# Patient Record
Sex: FEMALE | Race: BLACK OR AFRICAN AMERICAN | Employment: PART TIME | URBAN - METROPOLITAN AREA
[De-identification: names, ages, dates, MRNs, and addresses within clinical notes are randomized per-mention and may not be internally consistent; named-entity substitution may affect disease eponyms.]

---

## 2017-06-16 ENCOUNTER — OFFICE VISIT (OUTPATIENT)
Dept: INTERNAL MEDICINE CLINIC | Facility: CLINIC | Age: 57
End: 2017-06-16

## 2017-06-16 VITALS
RESPIRATION RATE: 12 BRPM | SYSTOLIC BLOOD PRESSURE: 160 MMHG | HEART RATE: 79 BPM | DIASTOLIC BLOOD PRESSURE: 100 MMHG | TEMPERATURE: 98 F | HEIGHT: 64 IN | BODY MASS INDEX: 41.83 KG/M2 | WEIGHT: 245 LBS

## 2017-06-16 DIAGNOSIS — E66.9 OBESITY, UNSPECIFIED OBESITY SEVERITY, UNSPECIFIED OBESITY TYPE: ICD-10-CM

## 2017-06-16 DIAGNOSIS — Z01.419 WELL FEMALE EXAM WITH ROUTINE GYNECOLOGICAL EXAM: ICD-10-CM

## 2017-06-16 DIAGNOSIS — R73.03 PREDIABETES: ICD-10-CM

## 2017-06-16 DIAGNOSIS — R60.0 BILATERAL LEG EDEMA: ICD-10-CM

## 2017-06-16 DIAGNOSIS — I10 ESSENTIAL HYPERTENSION: Primary | ICD-10-CM

## 2017-06-16 DIAGNOSIS — K92.1 HEMATOCHEZIA: ICD-10-CM

## 2017-06-16 PROCEDURE — 99213 OFFICE O/P EST LOW 20 MIN: CPT | Performed by: INTERNAL MEDICINE

## 2017-06-16 PROCEDURE — 99203 OFFICE O/P NEW LOW 30 MIN: CPT | Performed by: INTERNAL MEDICINE

## 2017-06-16 RX ORDER — VALSARTAN AND HYDROCHLOROTHIAZIDE 80; 12.5 MG/1; MG/1
1 TABLET, FILM COATED ORAL DAILY
Qty: 90 TABLET | Refills: 0 | Status: SHIPPED | OUTPATIENT
Start: 2017-06-16 | End: 2017-08-25

## 2017-06-16 NOTE — PROGRESS NOTES
HPI:    Patient ID: Lázaro Edmondson is a 65year old female. Blood In Stool  This is a new (red blood) problem. The current episode started 1 to 4 weeks ago (3 weeks). The problem occurs intermittently.  Associated symptoms include a change in bowel habit Hives   PHYSICAL EXAM:   Physical Exam   Constitutional: She appears well-developed. No distress.    obese   HENT:   Right Ear: External ear normal.   Left Ear: External ear normal.   Nose: Nose normal.   Mouth/Throat: Oropharynx is clear and mo has had chronic bilateral leg edema which apparently goes on and off for the past 3 years however has been progressive recently. She has no signs of CHF. She does have a obesity which is a contributing factor.   We will recheck her again once we restart h

## 2017-06-30 ENCOUNTER — LAB ENCOUNTER (OUTPATIENT)
Dept: LAB | Facility: HOSPITAL | Age: 57
End: 2017-06-30
Attending: INTERNAL MEDICINE
Payer: COMMERCIAL

## 2017-06-30 ENCOUNTER — OFFICE VISIT (OUTPATIENT)
Dept: GASTROENTEROLOGY | Facility: CLINIC | Age: 57
End: 2017-06-30

## 2017-06-30 VITALS
HEART RATE: 90 BPM | SYSTOLIC BLOOD PRESSURE: 146 MMHG | BODY MASS INDEX: 39.99 KG/M2 | HEIGHT: 65 IN | WEIGHT: 240 LBS | DIASTOLIC BLOOD PRESSURE: 80 MMHG

## 2017-06-30 DIAGNOSIS — Z13.220 LIPID SCREENING: ICD-10-CM

## 2017-06-30 DIAGNOSIS — E66.9 OBESITY, UNSPECIFIED OBESITY SEVERITY, UNSPECIFIED OBESITY TYPE: ICD-10-CM

## 2017-06-30 DIAGNOSIS — I10 ESSENTIAL HYPERTENSION: ICD-10-CM

## 2017-06-30 DIAGNOSIS — R73.03 PREDIABETES: ICD-10-CM

## 2017-06-30 DIAGNOSIS — K92.1 HEMATOCHEZIA: ICD-10-CM

## 2017-06-30 DIAGNOSIS — K92.1 BLOOD IN STOOL: Primary | ICD-10-CM

## 2017-06-30 DIAGNOSIS — K59.00 CONSTIPATION, UNSPECIFIED CONSTIPATION TYPE: ICD-10-CM

## 2017-06-30 DIAGNOSIS — R19.4 CHANGE IN BOWEL HABITS: ICD-10-CM

## 2017-06-30 LAB
ALBUMIN SERPL BCP-MCNC: 4.1 G/DL (ref 3.5–4.8)
ALBUMIN/GLOB SERPL: 1.2 {RATIO} (ref 1–2)
ALP SERPL-CCNC: 88 U/L (ref 32–100)
ALT SERPL-CCNC: 25 U/L (ref 14–54)
ANION GAP SERPL CALC-SCNC: 13 MMOL/L (ref 0–18)
AST SERPL-CCNC: 22 U/L (ref 15–41)
BASOPHILS # BLD: 0.1 K/UL (ref 0–0.2)
BASOPHILS NFR BLD: 1 %
BILIRUB SERPL-MCNC: 0.7 MG/DL (ref 0.3–1.2)
BILIRUB UR QL: NEGATIVE
BUN SERPL-MCNC: 11 MG/DL (ref 8–20)
BUN/CREAT SERPL: 13.8 (ref 10–20)
CALCIUM SERPL-MCNC: 9.7 MG/DL (ref 8.5–10.5)
CHLORIDE SERPL-SCNC: 102 MMOL/L (ref 95–110)
CO2 SERPL-SCNC: 22 MMOL/L (ref 22–32)
COLOR UR: YELLOW
CREAT SERPL-MCNC: 0.8 MG/DL (ref 0.5–1.5)
EOSINOPHIL # BLD: 0.2 K/UL (ref 0–0.7)
EOSINOPHIL NFR BLD: 2 %
ERYTHROCYTE [DISTWIDTH] IN BLOOD BY AUTOMATED COUNT: 13.9 % (ref 11–15)
GLOBULIN PLAS-MCNC: 3.3 G/DL (ref 2.5–3.7)
GLUCOSE SERPL-MCNC: 170 MG/DL (ref 70–99)
GLUCOSE UR-MCNC: NEGATIVE MG/DL
HBA1C MFR BLD: 7.8 % (ref 4–6)
HCT VFR BLD AUTO: 43.5 % (ref 35–48)
HGB BLD-MCNC: 14.2 G/DL (ref 12–16)
HGB UR QL STRIP.AUTO: NEGATIVE
KETONES UR-MCNC: NEGATIVE MG/DL
LEUKOCYTE ESTERASE UR QL STRIP.AUTO: NEGATIVE
LYMPHOCYTES # BLD: 3.6 K/UL (ref 1–4)
LYMPHOCYTES NFR BLD: 42 %
MCH RBC QN AUTO: 28.7 PG (ref 27–32)
MCHC RBC AUTO-ENTMCNC: 32.7 G/DL (ref 32–37)
MCV RBC AUTO: 87.7 FL (ref 80–100)
MONOCYTES # BLD: 0.6 K/UL (ref 0–1)
MONOCYTES NFR BLD: 8 %
NEUTROPHILS # BLD AUTO: 4 K/UL (ref 1.8–7.7)
NEUTROPHILS NFR BLD: 48 %
NITRITE UR QL STRIP.AUTO: NEGATIVE
OSMOLALITY UR CALC.SUM OF ELEC: 287 MOSM/KG (ref 275–295)
PH UR: 5 [PH] (ref 5–8)
PLATELET # BLD AUTO: 235 K/UL (ref 140–400)
PMV BLD AUTO: 9.8 FL (ref 7.4–10.3)
POTASSIUM SERPL-SCNC: 3.8 MMOL/L (ref 3.3–5.1)
PROT SERPL-MCNC: 7.4 G/DL (ref 5.9–8.4)
PROT UR-MCNC: NEGATIVE MG/DL
RBC # BLD AUTO: 4.96 M/UL (ref 3.7–5.4)
SODIUM SERPL-SCNC: 137 MMOL/L (ref 136–144)
SP GR UR STRIP: 1.02 (ref 1–1.03)
TSH SERPL-ACNC: 1.91 UIU/ML (ref 0.45–5.33)
UROBILINOGEN UR STRIP-ACNC: <2
VIT C UR-MCNC: NEGATIVE MG/DL
WBC # BLD AUTO: 8.5 K/UL (ref 4–11)

## 2017-06-30 PROCEDURE — 84443 ASSAY THYROID STIM HORMONE: CPT

## 2017-06-30 PROCEDURE — 85025 COMPLETE CBC W/AUTO DIFF WBC: CPT

## 2017-06-30 PROCEDURE — 99212 OFFICE O/P EST SF 10 MIN: CPT | Performed by: PHYSICIAN ASSISTANT

## 2017-06-30 PROCEDURE — 80061 LIPID PANEL: CPT

## 2017-06-30 PROCEDURE — 80053 COMPREHEN METABOLIC PANEL: CPT

## 2017-06-30 PROCEDURE — 83036 HEMOGLOBIN GLYCOSYLATED A1C: CPT

## 2017-06-30 PROCEDURE — 81003 URINALYSIS AUTO W/O SCOPE: CPT

## 2017-06-30 PROCEDURE — 99243 OFF/OP CNSLTJ NEW/EST LOW 30: CPT | Performed by: PHYSICIAN ASSISTANT

## 2017-06-30 PROCEDURE — 36415 COLL VENOUS BLD VENIPUNCTURE: CPT
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
## 2017-06-30 NOTE — PATIENT INSTRUCTIONS
1. Labs today at [facility]    2. Miralax one capful daily for constipation (mix in water or juice)    3. Drink plenty of water. 4. Stop smoking. 5. When I receive the results of the labs, we can decide re: colonoscopy or watchfully waiting.

## 2017-07-01 ENCOUNTER — TELEPHONE (OUTPATIENT)
Dept: INTERNAL MEDICINE CLINIC | Facility: CLINIC | Age: 57
End: 2017-07-01

## 2017-07-01 DIAGNOSIS — E66.9 OBESITY, UNSPECIFIED OBESITY SEVERITY, UNSPECIFIED OBESITY TYPE: Primary | ICD-10-CM

## 2017-07-01 DIAGNOSIS — Z13.220 LIPID SCREENING: ICD-10-CM

## 2017-07-01 LAB
CHOLEST SERPL-MCNC: 231 MG/DL (ref 110–200)
HDLC SERPL-MCNC: 41 MG/DL
NONHDLC SERPL-MCNC: 190 MG/DL
TRIGL SERPL-MCNC: 453 MG/DL (ref 1–149)

## 2017-07-05 ENCOUNTER — TELEPHONE (OUTPATIENT)
Dept: INTERNAL MEDICINE CLINIC | Facility: CLINIC | Age: 57
End: 2017-07-05

## 2017-07-06 ENCOUNTER — OFFICE VISIT (OUTPATIENT)
Dept: OBGYN CLINIC | Facility: CLINIC | Age: 57
End: 2017-07-06

## 2017-07-06 VITALS
HEIGHT: 65 IN | WEIGHT: 243.81 LBS | SYSTOLIC BLOOD PRESSURE: 124 MMHG | DIASTOLIC BLOOD PRESSURE: 75 MMHG | BODY MASS INDEX: 40.62 KG/M2 | HEART RATE: 80 BPM

## 2017-07-06 DIAGNOSIS — Z12.31 ENCOUNTER FOR SCREENING MAMMOGRAM FOR BREAST CANCER: ICD-10-CM

## 2017-07-06 DIAGNOSIS — N95.0 POSTMENOPAUSAL BLEEDING: ICD-10-CM

## 2017-07-06 DIAGNOSIS — Z12.4 CERVICAL CANCER SCREENING: ICD-10-CM

## 2017-07-06 DIAGNOSIS — Z01.419 ENCOUNTER FOR GYNECOLOGICAL EXAMINATION WITHOUT ABNORMAL FINDING: Primary | ICD-10-CM

## 2017-07-06 PROCEDURE — 99386 PREV VISIT NEW AGE 40-64: CPT | Performed by: OBSTETRICS & GYNECOLOGY

## 2017-07-06 RX ORDER — MISOPROSTOL 200 UG/1
TABLET ORAL
Qty: 2 TABLET | Refills: 0 | Status: SHIPPED | OUTPATIENT
Start: 2017-07-06 | End: 2017-07-14 | Stop reason: ALTCHOICE

## 2017-07-06 NOTE — PROGRESS NOTES
Well Woman Exam    HPI:  The patient is a 63yo female who presents for annual exam. She states she had a two day period in Jan. She states she has irregular menses. In 2015 she went a full year without bleeding. She does have hot flashes and night sweats. Oral Tab, Take 1 tablet by mouth daily. , Disp: 90 tablet, Rfl: 0    ALLERGIES:    Penicillins             Hives      Review of Systems:  Constitutional:  Denies fevers and chills   Cardiovascular:  denies chest pain or palpitations  Respiratory:  denies sh 3. Reviewed PMB and risks with PMB. Discussed procedure with her in detail   3. Breast Health:     1. Reviewed current guidelines with the patient and to start Mammograms at age 44  3. Reviewed monthly self breast exams with the patient   3.  Mammogram or

## 2017-07-07 LAB — HPV I/H RISK 1 DNA SPEC QL NAA+PROBE: NEGATIVE

## 2017-07-14 ENCOUNTER — OFFICE VISIT (OUTPATIENT)
Dept: INTERNAL MEDICINE CLINIC | Facility: CLINIC | Age: 57
End: 2017-07-14

## 2017-07-14 VITALS
BODY MASS INDEX: 40.48 KG/M2 | HEART RATE: 88 BPM | TEMPERATURE: 99 F | WEIGHT: 243 LBS | DIASTOLIC BLOOD PRESSURE: 77 MMHG | SYSTOLIC BLOOD PRESSURE: 122 MMHG | HEIGHT: 65 IN

## 2017-07-14 DIAGNOSIS — E78.2 MIXED HYPERLIPIDEMIA: ICD-10-CM

## 2017-07-14 DIAGNOSIS — E11.9 TYPE 2 DIABETES MELLITUS WITHOUT COMPLICATION, WITHOUT LONG-TERM CURRENT USE OF INSULIN (HCC): Primary | ICD-10-CM

## 2017-07-14 PROCEDURE — 99212 OFFICE O/P EST SF 10 MIN: CPT | Performed by: INTERNAL MEDICINE

## 2017-07-14 PROCEDURE — 99213 OFFICE O/P EST LOW 20 MIN: CPT | Performed by: INTERNAL MEDICINE

## 2017-07-14 RX ORDER — ATORVASTATIN CALCIUM 10 MG/1
10 TABLET, FILM COATED ORAL NIGHTLY
Qty: 90 TABLET | Refills: 0 | Status: SHIPPED | OUTPATIENT
Start: 2017-07-14 | End: 2017-09-06

## 2017-07-14 RX ORDER — METFORMIN HYDROCHLORIDE 500 MG/1
1000 TABLET, EXTENDED RELEASE ORAL DAILY
Qty: 180 TABLET | Refills: 0 | Status: SHIPPED | OUTPATIENT
Start: 2017-07-14 | End: 2017-09-06

## 2017-07-14 NOTE — PROGRESS NOTES
HPI:    Patient ID: Shalonda Vides is a 65year old female. Diabetes   She presents for her initial diabetic visit. She has type 2 diabetes mellitus. There are no hypoglycemic associated symptoms. There are no diabetic associated symptoms.  Pertinent neg oropharyngeal exudate. Eyes: Conjunctivae are normal. Right eye exhibits no discharge. Left eye exhibits no discharge. No scleral icterus. Neck: Normal range of motion. Neck supple. No JVD present. No thyromegaly present.    Cardiovascular: Normal rate,

## 2017-08-01 ENCOUNTER — TELEPHONE (OUTPATIENT)
Dept: INTERNAL MEDICINE CLINIC | Facility: CLINIC | Age: 57
End: 2017-08-01

## 2017-08-01 NOTE — TELEPHONE ENCOUNTER
Pt stts she uses One touch ultra mini. Pt needs test strips. Pt stts she only test once an day, in the morning. Please advise.

## 2017-08-04 NOTE — TELEPHONE ENCOUNTER
Refill Protocol Appointment Criteria: Refilled per protocol    · Appointment scheduled in the past 12 months or in the next 3 months  Recent Outpatient Visits            3 weeks ago Type 2 diabetes mellitus without complication, without long-term current u

## 2017-08-15 ENCOUNTER — TELEPHONE (OUTPATIENT)
Dept: INTERNAL MEDICINE CLINIC | Facility: CLINIC | Age: 57
End: 2017-08-15

## 2017-08-15 DIAGNOSIS — E78.2 MIXED HYPERLIPIDEMIA: ICD-10-CM

## 2017-08-15 NOTE — TELEPHONE ENCOUNTER
Called patient back she needs a PA for Valsartan. PA for Valsartan Hydrochlorothiazide 80-12.5 mg tab completed with [facility] via CMM response time 24-72 hours KEY [id number].

## 2017-08-22 ENCOUNTER — TELEPHONE (OUTPATIENT)
Dept: INTERNAL MEDICINE CLINIC | Facility: CLINIC | Age: 57
End: 2017-08-22

## 2017-08-25 NOTE — TELEPHONE ENCOUNTER
Called patient LMTCB; if before 4pm please transfer to [phone] if after that time please get mail order pharmacy information from patient. We need the name of the mail order pharmacy and telephone number. Thank you.

## 2017-08-25 NOTE — TELEPHONE ENCOUNTER
Notify pt about this and we will need to know what mail in pharmacy she can use so we can sent the prescription

## 2017-08-25 NOTE — TELEPHONE ENCOUNTER
Plan states the PA has been cancelled because patient has reached the maximum number of allowed retail fills; it has been exceeded. Patient needs to use the mail order pharmacy.

## 2017-08-30 RX ORDER — VALSARTAN AND HYDROCHLOROTHIAZIDE 80; 12.5 MG/1; MG/1
1 TABLET, FILM COATED ORAL DAILY
Qty: 90 TABLET | Refills: 0 | Status: SHIPPED | OUTPATIENT
Start: 2017-08-30 | End: 2017-10-04

## 2017-08-30 NOTE — TELEPHONE ENCOUNTER
No further advise; just have pt get her pb med so she can take it right away.  We can send erx to her local pharmacy for her generic Diovan HCT

## 2017-08-30 NOTE — TELEPHONE ENCOUNTER
Spoke to pt, her mail order pharmacy will be [facility]. Prescription was sent. Local pharmacy called, pt is willing to pay out of pocket for several days until she can receive the [facility] shipment.     Dr. Mary Castro, pt will be picking up a 5 day supply a

## 2017-09-06 ENCOUNTER — TELEPHONE (OUTPATIENT)
Dept: OTHER | Age: 57
End: 2017-09-06

## 2017-09-06 RX ORDER — METFORMIN HYDROCHLORIDE 500 MG/1
1000 TABLET, EXTENDED RELEASE ORAL DAILY
Qty: 180 TABLET | Refills: 0 | Status: SHIPPED | OUTPATIENT
Start: 2017-09-06 | End: 2017-10-04

## 2017-09-06 RX ORDER — ATORVASTATIN CALCIUM 10 MG/1
10 TABLET, FILM COATED ORAL NIGHTLY
Qty: 90 TABLET | Refills: 0 | Status: SHIPPED | OUTPATIENT
Start: 2017-09-06 | End: 2017-10-04

## 2017-09-06 NOTE — TELEPHONE ENCOUNTER
Pt was inform that his blood pressure medication has been called in to his mail pharmacy. He also request his DM and chol to be send to mail pharmacy.     Cholesterol Medications  Protocol Criteria:  · Appointment scheduled in the past 12 months or in the ago Type 2 diabetes mellitus without complication, without long-term current use of insulin [facility])    Shari Haro MD    Office Visit    2 months ago Encounter for gynecological examination without abnormal fin

## 2017-09-15 ENCOUNTER — HOSPITAL ENCOUNTER (OUTPATIENT)
Dept: MAMMOGRAPHY | Age: 57
Discharge: HOME | End: 2017-09-15
Attending: OBSTETRICS & GYNECOLOGY
Payer: COMMERCIAL

## 2017-09-15 DIAGNOSIS — Z12.31 ENCOUNTER FOR SCREENING MAMMOGRAM FOR BREAST CANCER: ICD-10-CM

## 2017-09-15 PROCEDURE — 77067 SCR MAMMO BI INCL CAD: CPT | Performed by: OBSTETRICS & GYNECOLOGY

## 2017-09-22 ENCOUNTER — HOSPITAL ENCOUNTER (OUTPATIENT)
Dept: ENDOCRINOLOGY | Facility: HOSPITAL | Age: 57
Discharge: HOME | End: 2017-09-22
Attending: INTERNAL MEDICINE
Payer: COMMERCIAL

## 2017-09-22 VITALS — WEIGHT: 246.81 LBS | BODY MASS INDEX: 41 KG/M2

## 2017-09-22 DIAGNOSIS — E11.9 TYPE 2 DIABETES MELLITUS WITHOUT COMPLICATION, WITHOUT LONG-TERM CURRENT USE OF INSULIN (HCC): Primary | ICD-10-CM

## 2017-09-22 NOTE — PROGRESS NOTES
Claudia Cast  : 1960 attended initial assessment for Diabetes Education:    Date: 2017   Start time: 3 PM End time: 3:45 PM    Wt 246 lb 12.8 oz   LMP 2017 (Approximate)   BMI 41.07 kg/m²       Glycohemoglobin (HgA1c) (%)   Date Value

## 2017-10-04 DIAGNOSIS — IMO0001 UNCONTROLLED TYPE 2 DIABETES MELLITUS WITHOUT COMPLICATION, WITHOUT LONG-TERM CURRENT USE OF INSULIN: Primary | ICD-10-CM

## 2017-10-04 DIAGNOSIS — E78.2 MIXED HYPERLIPIDEMIA: ICD-10-CM

## 2017-10-04 RX ORDER — VALSARTAN AND HYDROCHLOROTHIAZIDE 80; 12.5 MG/1; MG/1
1 TABLET, FILM COATED ORAL DAILY
Qty: 90 TABLET | Refills: 1 | Status: SHIPPED | OUTPATIENT
Start: 2017-10-04 | End: 2018-04-10

## 2017-10-05 RX ORDER — ATORVASTATIN CALCIUM 10 MG/1
TABLET, FILM COATED ORAL
Qty: 90 TABLET | Refills: 0 | Status: SHIPPED | OUTPATIENT
Start: 2017-10-05 | End: 2018-01-15

## 2017-10-05 RX ORDER — METFORMIN HYDROCHLORIDE 500 MG/1
TABLET, EXTENDED RELEASE ORAL
Qty: 180 TABLET | Refills: 1 | Status: SHIPPED | OUTPATIENT
Start: 2017-10-05 | End: 2018-04-10

## 2017-10-05 NOTE — TELEPHONE ENCOUNTER
Patient failed protocol. Script pended. Please advise.         Diabetes Medications  Protocol Criteria:  · Appointment scheduled in the past 6 months or the next 3 months  · A1C < 7.5 in the past 6 months  · Creatinine in the past 12 months  · Creatinine re without abnormal finding    [facility] for Cyrena Boeck, MD    Office Visit    3 months ago Blood in stool    [facility], [address] [facility], [address] [place] [address]    Office Visit    3 months ago Essential ago Blood in stool    [facility], [facility], [address] [facility] [place], [address] [place] [address], [facility] Visit    3 months ago Essential hypertension    Cuong Mcmanus, Anjelica Ding MD    Office Visit        Future Appointments       Pro

## 2017-10-16 ENCOUNTER — OFFICE VISIT (OUTPATIENT)
Dept: INTERNAL MEDICINE CLINIC | Facility: CLINIC | Age: 57
End: 2017-10-16

## 2017-10-16 VITALS
BODY MASS INDEX: 41.83 KG/M2 | RESPIRATION RATE: 12 BRPM | SYSTOLIC BLOOD PRESSURE: 124 MMHG | DIASTOLIC BLOOD PRESSURE: 74 MMHG | HEIGHT: 64 IN | WEIGHT: 245 LBS | HEART RATE: 80 BPM | TEMPERATURE: 98 F

## 2017-10-16 DIAGNOSIS — E78.2 MIXED HYPERLIPIDEMIA: ICD-10-CM

## 2017-10-16 DIAGNOSIS — R53.83 FATIGUE, UNSPECIFIED TYPE: ICD-10-CM

## 2017-10-16 DIAGNOSIS — I10 ESSENTIAL HYPERTENSION: ICD-10-CM

## 2017-10-16 DIAGNOSIS — IMO0001 UNCONTROLLED TYPE 2 DIABETES MELLITUS WITHOUT COMPLICATION, WITHOUT LONG-TERM CURRENT USE OF INSULIN: Primary | ICD-10-CM

## 2017-10-16 DIAGNOSIS — Z23 NEED FOR VACCINATION: ICD-10-CM

## 2017-10-16 PROCEDURE — 99214 OFFICE O/P EST MOD 30 MIN: CPT | Performed by: INTERNAL MEDICINE

## 2017-10-16 PROCEDURE — 99212 OFFICE O/P EST SF 10 MIN: CPT | Performed by: INTERNAL MEDICINE

## 2017-10-16 PROCEDURE — 90471 IMMUNIZATION ADMIN: CPT | Performed by: INTERNAL MEDICINE

## 2017-10-16 PROCEDURE — 90686 IIV4 VACC NO PRSV 0.5 ML IM: CPT | Performed by: INTERNAL MEDICINE

## 2017-10-16 NOTE — PROGRESS NOTES
HPI:    Patient ID: Rob Mcfadden is a 65year old female. Diabetes   She presents for her follow-up diabetic visit. She has type 2 diabetes mellitus. Her disease course has been improving. There are no hypoglycemic associated symptoms.  Associated symp chronic renal disease. Pertinent negatives include no chest pain or shortness of breath. Current antihyperlipidemic treatment includes statins, exercise and diet change. Improvement on treatment: just started 3mos ago. There are no compliance problems.   Ri normal. No respiratory distress. She has no wheezes. She has no rales. Abdominal: Soft. Bowel sounds are normal. She exhibits no distension. There is no tenderness. There is no rebound and no guarding. Musculoskeletal: Normal range of motion.  She exhib blood glucose bid - For One Touch Ultra Mini           Imaging & Referrals:  FLULAVAL INFLUENZA VACCINE QUAD PRESERVATIVE FREE 0.5 ML  OPHTHALMOLOGY - INTERNAL  PULMONARY - INTERNAL       #[phone]

## 2017-10-17 ENCOUNTER — APPOINTMENT (OUTPATIENT)
Dept: LAB | Age: 57
End: 2017-10-17
Attending: INTERNAL MEDICINE
Payer: COMMERCIAL

## 2017-10-17 DIAGNOSIS — E78.2 MIXED HYPERLIPIDEMIA: ICD-10-CM

## 2017-10-17 DIAGNOSIS — IMO0001 UNCONTROLLED TYPE 2 DIABETES MELLITUS WITHOUT COMPLICATION, WITHOUT LONG-TERM CURRENT USE OF INSULIN: ICD-10-CM

## 2017-10-17 PROCEDURE — 80061 LIPID PANEL: CPT

## 2017-10-17 PROCEDURE — 82570 ASSAY OF URINE CREATININE: CPT | Performed by: INTERNAL MEDICINE

## 2017-10-17 PROCEDURE — 36415 COLL VENOUS BLD VENIPUNCTURE: CPT

## 2017-10-17 PROCEDURE — 80053 COMPREHEN METABOLIC PANEL: CPT

## 2017-10-17 PROCEDURE — 82043 UR ALBUMIN QUANTITATIVE: CPT | Performed by: INTERNAL MEDICINE

## 2017-10-17 PROCEDURE — 83036 HEMOGLOBIN GLYCOSYLATED A1C: CPT

## 2017-10-18 ENCOUNTER — HOSPITAL ENCOUNTER (OUTPATIENT)
Dept: ENDOCRINOLOGY | Facility: HOSPITAL | Age: 57
Discharge: HOME | End: 2017-10-18
Attending: INTERNAL MEDICINE
Payer: COMMERCIAL

## 2017-10-18 VITALS — WEIGHT: 245.13 LBS | BODY MASS INDEX: 42 KG/M2

## 2017-10-18 DIAGNOSIS — E11.9 TYPE 2 DIABETES MELLITUS WITHOUT COMPLICATION, WITHOUT LONG-TERM CURRENT USE OF INSULIN (HCC): Primary | ICD-10-CM

## 2017-10-19 ENCOUNTER — TELEPHONE (OUTPATIENT)
Dept: OTHER | Age: 57
End: 2017-10-19

## 2017-10-19 NOTE — TELEPHONE ENCOUNTER
Patient informed of results (identified name and ) and recommendations, as per Dr. Ildefonso Cisneros result note. Patient voiced understanding and agrees with recommendations. Denies further questions/concerns at this time.

## 2017-10-19 NOTE — PROGRESS NOTES
Veronica Leiva  DOB5/27/1960 attended Diabetes Education Group Class 1:    Date: 10/18/2017  Start time: 6 pm  End time: 8 pm    Diabetes Overview, pathophysiology, pre-diabetes, A1C results and treatment options for diabetes self-management, types of diab

## 2017-10-19 NOTE — TELEPHONE ENCOUNTER
----- Message from Charlette Pitts RN sent at 10/18/2017  3:18 PM CDT -----      ----- Message -----  From: Juaquin Bamberger, MD  Sent: 10/18/2017   1:10 PM  To:  Melany Barillas Lpn/Ciera    Notify pt; her labs shows her a1c has improved down to 6.9 so dm is

## 2017-10-25 ENCOUNTER — HOSPITAL ENCOUNTER (OUTPATIENT)
Dept: ENDOCRINOLOGY | Facility: HOSPITAL | Age: 57
Discharge: HOME | End: 2017-10-25
Attending: INTERNAL MEDICINE
Payer: COMMERCIAL

## 2017-10-25 VITALS — BODY MASS INDEX: 40.84 KG/M2 | WEIGHT: 242.13 LBS | HEIGHT: 64.5 IN

## 2017-10-25 DIAGNOSIS — E11.9 TYPE 2 DIABETES MELLITUS WITHOUT COMPLICATION, WITHOUT LONG-TERM CURRENT USE OF INSULIN (HCC): Primary | ICD-10-CM

## 2017-10-26 NOTE — PROGRESS NOTES
Almendarez Jeremy   attended Diabetes Education: Step 3    Date: 10/25/2017  Start time:   End time:     Weight: 242.1#    Blood Glucose: FB-186;  AC dinner 142-160    Diet: Eating 3 meals, 1-3 snacks.  Has never been a bkfst eater so

## 2017-11-08 ENCOUNTER — APPOINTMENT (OUTPATIENT)
Dept: ENDOCRINOLOGY | Facility: HOSPITAL | Age: 57
End: 2017-11-08
Attending: INTERNAL MEDICINE
Payer: COMMERCIAL

## 2017-11-27 ENCOUNTER — HOSPITAL ENCOUNTER (OUTPATIENT)
Dept: ENDOCRINOLOGY | Facility: HOSPITAL | Age: 57
Discharge: HOME | End: 2017-11-27
Attending: INTERNAL MEDICINE
Payer: COMMERCIAL

## 2017-11-27 VITALS — BODY MASS INDEX: 40 KG/M2 | WEIGHT: 238.81 LBS

## 2017-11-27 DIAGNOSIS — E11.9 TYPE 2 DIABETES MELLITUS WITHOUT COMPLICATION, WITHOUT LONG-TERM CURRENT USE OF INSULIN (HCC): Primary | ICD-10-CM

## 2017-11-27 NOTE — PROGRESS NOTES
Yoselin Abreu  :1960 attended Step 4 Diabetic Education:    Date: 2017  Start time: 11:20 AM End time: 12:00 PM      Wt 238 lb 12.8 oz   BMI 40.36 kg/m²   Weight Change:  - 8.0 lbs      Glycohemoglobin (HgA1c) (%)   Date Value   10/17/2017 6

## 2017-12-13 ENCOUNTER — HOSPITAL ENCOUNTER (OUTPATIENT)
Dept: ENDOCRINOLOGY | Facility: HOSPITAL | Age: 57
Discharge: HOME | End: 2017-12-13
Attending: INTERNAL MEDICINE
Payer: COMMERCIAL

## 2017-12-13 VITALS — WEIGHT: 238.31 LBS | BODY MASS INDEX: 40 KG/M2

## 2017-12-13 DIAGNOSIS — E11.9 TYPE 2 DIABETES MELLITUS WITHOUT COMPLICATION, WITHOUT LONG-TERM CURRENT USE OF INSULIN (HCC): Primary | ICD-10-CM

## 2017-12-14 NOTE — PROGRESS NOTES
Pedro WILSON 1960 attended Step 5 Diabetic Education Group Class:    Date: 2017  Start time: 6:00 pm End time: 7:45 pm    Beginning Weight: 246.8#          End weight: 238.3#    POC HbA1c 7.2%    Diet:  Eating 3 meals per day with 1-2 sna implementing behavior change goals. Stay active. Increase frequency of exercise to 5 days a week as able. Maintain regular follow-up with physician. Written materials provided for all areas covered.     Patient verbalized understanding and has no furth

## 2018-01-15 DIAGNOSIS — E78.2 MIXED HYPERLIPIDEMIA: ICD-10-CM

## 2018-01-16 RX ORDER — ATORVASTATIN CALCIUM 10 MG/1
TABLET, FILM COATED ORAL
Qty: 90 TABLET | Refills: 0 | Status: SHIPPED | OUTPATIENT
Start: 2018-01-16 | End: 2018-04-03 | Stop reason: SINTOL

## 2018-03-14 ENCOUNTER — OFFICE VISIT (OUTPATIENT)
Dept: INTERNAL MEDICINE CLINIC | Facility: CLINIC | Age: 58
End: 2018-03-14

## 2018-03-14 VITALS
HEIGHT: 64 IN | TEMPERATURE: 98 F | HEART RATE: 82 BPM | WEIGHT: 235 LBS | SYSTOLIC BLOOD PRESSURE: 126 MMHG | DIASTOLIC BLOOD PRESSURE: 80 MMHG | BODY MASS INDEX: 40.12 KG/M2

## 2018-03-14 DIAGNOSIS — I10 ESSENTIAL HYPERTENSION: ICD-10-CM

## 2018-03-14 DIAGNOSIS — E11.9 CONTROLLED TYPE 2 DIABETES MELLITUS WITHOUT COMPLICATION, WITHOUT LONG-TERM CURRENT USE OF INSULIN (HCC): Primary | ICD-10-CM

## 2018-03-14 DIAGNOSIS — R22.32 AXILLARY MASS, LEFT: ICD-10-CM

## 2018-03-14 DIAGNOSIS — N64.4 NIPPLE PAIN: ICD-10-CM

## 2018-03-14 DIAGNOSIS — M79.10 MYALGIA: ICD-10-CM

## 2018-03-14 DIAGNOSIS — E78.2 MIXED HYPERLIPIDEMIA: ICD-10-CM

## 2018-03-14 PROCEDURE — 99214 OFFICE O/P EST MOD 30 MIN: CPT | Performed by: INTERNAL MEDICINE

## 2018-03-14 PROCEDURE — 99212 OFFICE O/P EST SF 10 MIN: CPT | Performed by: INTERNAL MEDICINE

## 2018-03-14 NOTE — PROGRESS NOTES
HPI:    Patient ID: Aida Bains is a 65year old female. Diabetes   She presents for her follow-up diabetic visit. She has type 2 diabetes mellitus. Her disease course has been stable. There are no hypoglycemic associated symptoms.  There are no diabe history of extremity trauma. The problem occurs constantly. The problem has been unchanged. The quality of the pain is described as aching. The pain is moderate. Associated symptoms include stiffness.  Pertinent negatives include no fever, inability to bear chest pain. Musculoskeletal: Positive for stiffness. Neurological: Negative for tingling and numbness.             Current Outpatient Prescriptions:  ATORVASTATIN 10 MG Oral Tab TAKE 1 TABLET BY MOUTH  NIGHTLY Disp: 90 tablet Rfl: 0   Glucose Blood In V again advised to see optha for dm eye exam. Pt also advised again to quit smoking.     (I10) Essential hypertension  Plan: bp controlled with current bp meds.  cpm.    (E78.2) Mixed hyperlipidemia  Plan: COMP METABOLIC PANEL (14), LIPID PANEL        Check l

## 2018-03-16 ENCOUNTER — APPOINTMENT (OUTPATIENT)
Dept: LAB | Age: 58
End: 2018-03-16
Attending: INTERNAL MEDICINE
Payer: COMMERCIAL

## 2018-03-16 DIAGNOSIS — E11.9 CONTROLLED TYPE 2 DIABETES MELLITUS WITHOUT COMPLICATION, WITHOUT LONG-TERM CURRENT USE OF INSULIN (HCC): ICD-10-CM

## 2018-03-16 DIAGNOSIS — E78.2 MIXED HYPERLIPIDEMIA: ICD-10-CM

## 2018-03-16 DIAGNOSIS — M79.10 MYALGIA: ICD-10-CM

## 2018-03-16 LAB
ALBUMIN SERPL BCP-MCNC: 3.8 G/DL (ref 3.5–4.8)
ALBUMIN/GLOB SERPL: 1.3 {RATIO} (ref 1–2)
ALP SERPL-CCNC: 68 U/L (ref 32–100)
ALT SERPL-CCNC: 24 U/L (ref 14–54)
ANION GAP SERPL CALC-SCNC: 12 MMOL/L (ref 0–18)
AST SERPL-CCNC: 26 U/L (ref 15–41)
BILIRUB SERPL-MCNC: 0.4 MG/DL (ref 0.3–1.2)
BUN SERPL-MCNC: 10 MG/DL (ref 8–20)
BUN/CREAT SERPL: 15.4 (ref 10–20)
CALCIUM SERPL-MCNC: 9.1 MG/DL (ref 8.5–10.5)
CHLORIDE SERPL-SCNC: 100 MMOL/L (ref 95–110)
CHOLEST SERPL-MCNC: 159 MG/DL (ref 110–200)
CK SERPL-CCNC: 69 U/L (ref 38–234)
CO2 SERPL-SCNC: 23 MMOL/L (ref 22–32)
CREAT SERPL-MCNC: 0.65 MG/DL (ref 0.5–1.5)
CREAT UR-MCNC: 77.7 MG/DL
GLOBULIN PLAS-MCNC: 3 G/DL (ref 2.5–3.7)
GLUCOSE SERPL-MCNC: 87 MG/DL (ref 70–99)
HBA1C MFR BLD: 6.8 % (ref 4–6)
HDLC SERPL-MCNC: 47 MG/DL
LDLC SERPL CALC-MCNC: 59 MG/DL (ref 0–99)
MICROALBUMIN UR-MCNC: 0.3 MG/DL (ref 0–1.8)
MICROALBUMIN/CREAT UR: 3.9 MG/G{CREAT} (ref 0–20)
NONHDLC SERPL-MCNC: 112 MG/DL
OSMOLALITY UR CALC.SUM OF ELEC: 278 MOSM/KG (ref 275–295)
PATIENT FASTING: YES
POTASSIUM SERPL-SCNC: 3.5 MMOL/L (ref 3.3–5.1)
PROT SERPL-MCNC: 6.8 G/DL (ref 5.9–8.4)
SODIUM SERPL-SCNC: 135 MMOL/L (ref 136–144)
TRIGL SERPL-MCNC: 266 MG/DL (ref 1–149)

## 2018-03-16 PROCEDURE — 36415 COLL VENOUS BLD VENIPUNCTURE: CPT

## 2018-03-16 PROCEDURE — 82043 UR ALBUMIN QUANTITATIVE: CPT | Performed by: INTERNAL MEDICINE

## 2018-03-16 PROCEDURE — 80053 COMPREHEN METABOLIC PANEL: CPT

## 2018-03-16 PROCEDURE — 82550 ASSAY OF CK (CPK): CPT

## 2018-03-16 PROCEDURE — 82570 ASSAY OF URINE CREATININE: CPT | Performed by: INTERNAL MEDICINE

## 2018-03-16 PROCEDURE — 80061 LIPID PANEL: CPT

## 2018-03-16 PROCEDURE — 83036 HEMOGLOBIN GLYCOSYLATED A1C: CPT

## 2018-03-21 ENCOUNTER — APPOINTMENT (OUTPATIENT)
Dept: GENERAL RADIOLOGY | Age: 58
End: 2018-03-21
Attending: NURSE PRACTITIONER
Payer: COMMERCIAL

## 2018-03-21 ENCOUNTER — HOSPITAL ENCOUNTER (OUTPATIENT)
Age: 58
Discharge: HOME | End: 2018-03-21
Payer: COMMERCIAL

## 2018-03-21 VITALS
DIASTOLIC BLOOD PRESSURE: 80 MMHG | RESPIRATION RATE: 18 BRPM | WEIGHT: 238 LBS | SYSTOLIC BLOOD PRESSURE: 168 MMHG | TEMPERATURE: 98 F | OXYGEN SATURATION: 98 % | BODY MASS INDEX: 41 KG/M2 | HEART RATE: 81 BPM

## 2018-03-21 DIAGNOSIS — M25.512 ACUTE PAIN OF LEFT SHOULDER: Primary | ICD-10-CM
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
## 2018-03-21 PROCEDURE — 73030 X-RAY EXAM OF SHOULDER: CPT | Performed by: NURSE PRACTITIONER

## 2018-03-21 PROCEDURE — 99213 OFFICE O/P EST LOW 20 MIN: CPT

## 2018-03-21 PROCEDURE — 99203 OFFICE O/P NEW LOW 30 MIN: CPT
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
## 2018-03-22 PROBLEM — L72.0 EPIDERMOID CYST OF SKIN: Status: ACTIVE | Noted: 2018-03-22

## 2018-04-03 ENCOUNTER — TELEPHONE (OUTPATIENT)
Dept: OTHER | Age: 58
End: 2018-04-03

## 2018-04-03 RX ORDER — ROSUVASTATIN CALCIUM 10 MG/1
10 TABLET, COATED ORAL NIGHTLY
Qty: 90 TABLET | Refills: 0 | Status: SHIPPED | OUTPATIENT
Start: 2018-04-03 | End: 2018-05-27

## 2018-04-03 NOTE — TELEPHONE ENCOUNTER
----- Message from Matthew Russo sent at 4/2/2018 12:05 PM CDT -----  Fwd abnormal results to RN in triage    Notes recorded by Darryn Gifford MD on 4/1/2018 at 8:38 PM CDT  Since her leg pains are better since stopping  Atorvastatin then this is

## 2018-04-03 NOTE — TELEPHONE ENCOUNTER
Pt contacted and informed of results as stated below by EL. Pt verbalized understanding/compliance. States she is willing to begin generic crestor 10mg QHS as recommended. Will call us if any leg discomfort or side effects orccur.    Pt states her pharm is

## 2018-04-11 RX ORDER — METFORMIN HYDROCHLORIDE 500 MG/1
TABLET, EXTENDED RELEASE ORAL
Qty: 180 TABLET | Refills: 0 | Status: SHIPPED | OUTPATIENT
Start: 2018-04-11 | End: 2018-06-30

## 2018-04-11 RX ORDER — VALSARTAN AND HYDROCHLOROTHIAZIDE 80; 12.5 MG/1; MG/1
1 TABLET, FILM COATED ORAL DAILY
Qty: 90 TABLET | Refills: 0 | Status: SHIPPED | OUTPATIENT
Start: 2018-04-11 | End: 2018-06-30

## 2018-04-11 RX ORDER — ATORVASTATIN CALCIUM 10 MG/1
TABLET, FILM COATED ORAL
Qty: 90 TABLET | OUTPATIENT
Start: 2018-04-11

## 2018-05-15 ENCOUNTER — OFFICE VISIT (OUTPATIENT)
Dept: OPTOMETRY | Facility: CLINIC | Age: 58
End: 2018-05-15

## 2018-05-15 DIAGNOSIS — H25.13 AGE-RELATED NUCLEAR CATARACT OF BOTH EYES: ICD-10-CM

## 2018-05-15 DIAGNOSIS — E11.9 TYPE 2 DIABETES MELLITUS WITHOUT COMPLICATION, WITHOUT LONG-TERM CURRENT USE OF INSULIN (HCC): Primary | ICD-10-CM

## 2018-05-15 PROCEDURE — 92004 COMPRE OPH EXAM NEW PT 1/>: CPT | Performed by: OPTOMETRIST

## 2018-05-15 NOTE — PROGRESS NOTES
Roxane Negro is a 65year old female. HPI:     HPI     Diabetic Eye Exam   Diabetes characteristics include Type 2, controlled with diet and taking oral medications. Duration of 6 months.            Comments   Patient is in for an annual diabetic eye Allergies:    Penicillins             HIVES    ROS:     ROS     Negative for: Constitutional, Gastrointestinal, Neurological, Skin, Genitourinary, Musculoskeletal, HENT, Endocrine, Cardiovascular, Eyes, Respiratory, Psychiatric, Allergic/Imm, Heme/Ly Type 2 diabetes mellitus without complication ([facility]  I advised patient that there is no background diabetic retinopathy in either eye and that they should continue to keep their blood sugar under control and continue to see their physician as directed.

## 2018-05-15 NOTE — PATIENT INSTRUCTIONS
Type 2 diabetes mellitus without complication ([facility].)  I advised patient that there is no background diabetic retinopathy in either eye and that they should continue to keep their blood sugar under control and continue to see their physician as directed.  I s

## 2018-05-27 RX ORDER — ROSUVASTATIN CALCIUM 10 MG/1
TABLET, COATED ORAL
Qty: 90 TABLET | Refills: 0 | Status: SHIPPED | OUTPATIENT
Start: 2018-05-27 | End: 2019-04-25

## 2018-06-30 RX ORDER — VALSARTAN AND HYDROCHLOROTHIAZIDE 80; 12.5 MG/1; MG/1
1 TABLET, FILM COATED ORAL DAILY
Qty: 90 TABLET | OUTPATIENT
Start: 2018-06-30

## 2018-06-30 RX ORDER — METFORMIN HYDROCHLORIDE 500 MG/1
TABLET, EXTENDED RELEASE ORAL
Qty: 180 TABLET | OUTPATIENT
Start: 2018-06-30

## 2018-06-30 RX ORDER — METFORMIN HYDROCHLORIDE 500 MG/1
1000 TABLET, EXTENDED RELEASE ORAL
Qty: 180 TABLET | Refills: 0 | Status: SHIPPED | OUTPATIENT
Start: 2018-06-30 | End: 2018-09-19

## 2018-06-30 RX ORDER — VALSARTAN AND HYDROCHLOROTHIAZIDE 80; 12.5 MG/1; MG/1
1 TABLET, FILM COATED ORAL DAILY
Qty: 90 TABLET | Refills: 0 | Status: SHIPPED | OUTPATIENT
Start: 2018-06-30 | End: 2018-09-19

## 2018-07-01 NOTE — TELEPHONE ENCOUNTER
Hypertensive Medications  Protocol Criteria:  · Appointment scheduled in the past 6 months or in the next 3 months  · BMP or CMP in the past 12 months  · Creatinine result < 2  Recent Outpatient Visits            1 month ago Type 2 diabetes mellitus with[facility] Junaid [place]    Office Visit    3 months ago Epidermoid cyst of skin    [facility] SURGERY Kris Butler MD    Office Visit    3 months ago Controlled type 2 diabetes mellitus without complication, without long-term current u

## 2018-09-19 RX ORDER — METFORMIN HYDROCHLORIDE 500 MG/1
TABLET, EXTENDED RELEASE ORAL
Qty: 180 TABLET | Refills: 0 | Status: SHIPPED | OUTPATIENT
Start: 2018-09-19 | End: 2018-12-08

## 2018-09-19 RX ORDER — VALSARTAN AND HYDROCHLOROTHIAZIDE 80; 12.5 MG/1; MG/1
1 TABLET, FILM COATED ORAL DAILY
Qty: 90 TABLET | Refills: 0 | Status: SHIPPED | OUTPATIENT
Start: 2018-09-19 | End: 2018-12-08

## 2018-10-15 ENCOUNTER — TELEPHONE (OUTPATIENT)
Dept: INTERNAL MEDICINE CLINIC | Facility: CLINIC | Age: 58
End: 2018-10-15
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
## 2018-10-16 NOTE — TELEPHONE ENCOUNTER
Called pt and informed that she needs appt to get referral for hand surgeon. appt scheduled with Dr. Clinton Perales for 10/18/19 at 3:30.  Ok per Dr. Clinton Perales

## 2018-10-18 ENCOUNTER — OFFICE VISIT (OUTPATIENT)
Dept: INTERNAL MEDICINE CLINIC | Facility: CLINIC | Age: 58
End: 2018-10-18
Payer: COMMERCIAL

## 2018-10-18 ENCOUNTER — HOSPITAL ENCOUNTER (OUTPATIENT)
Dept: GENERAL RADIOLOGY | Age: 58
Discharge: HOME | End: 2018-10-18
Attending: INTERNAL MEDICINE
Payer: COMMERCIAL

## 2018-10-18 VITALS
HEART RATE: 114 BPM | BODY MASS INDEX: 40.12 KG/M2 | HEIGHT: 64 IN | DIASTOLIC BLOOD PRESSURE: 82 MMHG | SYSTOLIC BLOOD PRESSURE: 127 MMHG | WEIGHT: 235 LBS

## 2018-10-18 DIAGNOSIS — M25.561 ACUTE PAIN OF RIGHT KNEE: ICD-10-CM

## 2018-10-18 DIAGNOSIS — S49.91XA RIGHT SHOULDER INJURY, INITIAL ENCOUNTER: Primary | ICD-10-CM
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
## 2018-10-18 PROCEDURE — 73560 X-RAY EXAM OF KNEE 1 OR 2: CPT | Performed by: INTERNAL MEDICINE

## 2018-10-18 PROCEDURE — 99212 OFFICE O/P EST SF 10 MIN: CPT | Performed by: INTERNAL MEDICINE

## 2018-10-18 PROCEDURE — 99214 OFFICE O/P EST MOD 30 MIN: CPT | Performed by: INTERNAL MEDICINE
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
## 2018-10-20 PROBLEM — S49.91XA RIGHT SHOULDER INJURY, INITIAL ENCOUNTER: Status: ACTIVE | Noted: 2018-10-20

## 2018-10-20 PROBLEM — M25.561 ACUTE PAIN OF RIGHT KNEE: Status: ACTIVE | Noted: 2018-10-20

## 2018-10-26 ENCOUNTER — HOSPITAL ENCOUNTER (OUTPATIENT)
Dept: GENERAL RADIOLOGY | Facility: HOSPITAL | Age: 58
Discharge: HOME | End: 2018-10-26
Attending: ORTHOPAEDIC SURGERY
Payer: COMMERCIAL

## 2018-10-26 ENCOUNTER — OFFICE VISIT (OUTPATIENT)
Dept: ORTHOPEDICS CLINIC | Facility: CLINIC | Age: 58
End: 2018-10-26
Payer: COMMERCIAL

## 2018-10-26 DIAGNOSIS — S16.1XXA CERVICAL STRAIN, ACUTE, INITIAL ENCOUNTER: ICD-10-CM

## 2018-10-26 DIAGNOSIS — M25.511 ACUTE PAIN OF RIGHT SHOULDER: Primary | ICD-10-CM

## 2018-10-26 DIAGNOSIS — M25.511 RIGHT SHOULDER PAIN, UNSPECIFIED CHRONICITY: ICD-10-CM

## 2018-10-26 PROCEDURE — 99212 OFFICE O/P EST SF 10 MIN: CPT | Performed by: ORTHOPAEDIC SURGERY

## 2018-10-26 PROCEDURE — 72040 X-RAY EXAM NECK SPINE 2-3 VW: CPT | Performed by: ORTHOPAEDIC SURGERY

## 2018-10-26 PROCEDURE — 99243 OFF/OP CNSLTJ NEW/EST LOW 30: CPT | Performed by: ORTHOPAEDIC SURGERY

## 2018-10-26 PROCEDURE — 73030 X-RAY EXAM OF SHOULDER: CPT | Performed by: ORTHOPAEDIC SURGERY
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
## 2018-10-30 ENCOUNTER — TELEPHONE (OUTPATIENT)
Dept: ORTHOPEDICS CLINIC | Facility: CLINIC | Age: 58
End: 2018-10-30

## 2018-10-30 NOTE — TELEPHONE ENCOUNTER
Patient called and informed no prior authorization is required for her MRI right shoulder CPT=73221. Patient given central scheduling number and hours. Patient informed to verify benefits to check out of pocket expenses if any.  Patient verbalized [employer]

## 2018-10-30 NOTE — TELEPHONE ENCOUNTER
[employer] called at [phone] and spoke to Lenora. Per Lenora no prior authorization is needed for MRI right shoulder CPT=73221.

## 2018-11-06 ENCOUNTER — HOSPITAL ENCOUNTER (OUTPATIENT)
Dept: MRI IMAGING | Facility: HOSPITAL | Age: 58
Discharge: HOME | End: 2018-11-06
Attending: ORTHOPAEDIC SURGERY
Payer: COMMERCIAL

## 2018-11-06 DIAGNOSIS — M25.511 ACUTE PAIN OF RIGHT SHOULDER: ICD-10-CM

## 2018-11-06 PROCEDURE — 73221 MRI JOINT UPR EXTREM W/O DYE: CPT | Performed by: ORTHOPAEDIC SURGERY

## 2018-11-09 ENCOUNTER — OFFICE VISIT (OUTPATIENT)
Dept: ORTHOPEDICS CLINIC | Facility: CLINIC | Age: 58
End: 2018-11-09
Payer: COMMERCIAL

## 2018-11-09 DIAGNOSIS — M75.21 BICEPS TENDONITIS ON RIGHT: ICD-10-CM

## 2018-11-09 DIAGNOSIS — M75.121 COMPLETE TEAR OF RIGHT ROTATOR CUFF: Primary | ICD-10-CM

## 2018-11-09 PROCEDURE — 99213 OFFICE O/P EST LOW 20 MIN: CPT | Performed by: ORTHOPAEDIC SURGERY

## 2018-11-09 PROCEDURE — 99212 OFFICE O/P EST SF 10 MIN: CPT | Performed by: ORTHOPAEDIC SURGERY

## 2018-11-09 NOTE — H&P (VIEW-ONLY)
11/9/2018  Mayvito Mccray Do  5/27/1960  63year old   female  Roxanne Broderick MD    HPI:   Patient presents with:  Shoulder Pain: Right f/u and MRI results - states she still has pain rated as 5-10/10 at all the time.     This is a pleasant 61-year-old fe Comment: social/weekends    Drug use: Yes      Types: Cannabis          REVIEW OF SYSTEMS:   A 12 point review of systems was performed as documented on the intake form and reviewed by me today with pertinent positives and negatives listed in the HPI. decompression, rotator cuff repair, and biceps tenodesis. Patient will need a minimum 4.5 months redilatation after surgery. She will need medical clearance by her primary care physician.     The risks, benefits, and alternatives of surgical versus non-be

## 2018-11-09 NOTE — H&P
11/9/2018  Pedro Garnerector Reesein  5/27/1960  63year old   female  Eugenio Dunham MD    HPI:   Patient presents with:  Shoulder Pain: Right f/u and MRI results - states she still has pain rated as 5-10/10 at all the time.     This is a pleasant 15-year-old fe Comment: social/weekends    Drug use: Yes      Types: Cannabis          REVIEW OF SYSTEMS:   A 12 point review of systems was performed as documented on the intake form and reviewed by me today with pertinent positives and negatives listed in the HPI. decompression, rotator cuff repair, and biceps tenodesis. Patient will need a minimum 4.5 months redilatation after surgery. She will need medical clearance by her primary care physician.     The risks, benefits, and alternatives of surgical versus non-be

## 2018-11-15 ENCOUNTER — OFFICE VISIT (OUTPATIENT)
Dept: INTERNAL MEDICINE CLINIC | Facility: CLINIC | Age: 58
End: 2018-11-15
Payer: COMMERCIAL

## 2018-11-15 VITALS
WEIGHT: 228 LBS | DIASTOLIC BLOOD PRESSURE: 76 MMHG | SYSTOLIC BLOOD PRESSURE: 126 MMHG | HEART RATE: 85 BPM | HEIGHT: 64 IN | TEMPERATURE: 98 F | BODY MASS INDEX: 38.93 KG/M2

## 2018-11-15 DIAGNOSIS — Z01.818 PREOP GENERAL PHYSICAL EXAM: Primary | ICD-10-CM

## 2018-11-15 DIAGNOSIS — E78.00 HYPERCHOLESTEREMIA: ICD-10-CM

## 2018-11-15 DIAGNOSIS — I10 ESSENTIAL HYPERTENSION: ICD-10-CM

## 2018-11-15 DIAGNOSIS — M75.121 COMPLETE TEAR OF RIGHT ROTATOR CUFF: ICD-10-CM

## 2018-11-15 DIAGNOSIS — E11.9 CONTROLLED TYPE 2 DIABETES MELLITUS WITHOUT COMPLICATION, WITHOUT LONG-TERM CURRENT USE OF INSULIN (HCC): ICD-10-CM

## 2018-11-15 PROCEDURE — 90471 IMMUNIZATION ADMIN: CPT | Performed by: INTERNAL MEDICINE

## 2018-11-15 PROCEDURE — 99212 OFFICE O/P EST SF 10 MIN: CPT | Performed by: INTERNAL MEDICINE

## 2018-11-15 PROCEDURE — 90686 IIV4 VACC NO PRSV 0.5 ML IM: CPT | Performed by: INTERNAL MEDICINE

## 2018-11-15 PROCEDURE — 99214 OFFICE O/P EST MOD 30 MIN: CPT | Performed by: INTERNAL MEDICINE
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
## 2018-11-16 ENCOUNTER — LAB ENCOUNTER (OUTPATIENT)
Dept: LAB | Age: 58
End: 2018-11-16
Attending: INTERNAL MEDICINE
Payer: COMMERCIAL

## 2018-11-16 ENCOUNTER — APPOINTMENT (OUTPATIENT)
Dept: LAB | Age: 58
End: 2018-11-16
Attending: INTERNAL MEDICINE
Payer: COMMERCIAL

## 2018-11-16 DIAGNOSIS — E78.00 HYPERCHOLESTEREMIA: ICD-10-CM

## 2018-11-16 DIAGNOSIS — E11.9 CONTROLLED TYPE 2 DIABETES MELLITUS WITHOUT COMPLICATION, WITHOUT LONG-TERM CURRENT USE OF INSULIN (HCC): ICD-10-CM

## 2018-11-16 DIAGNOSIS — Z01.818 PREOP GENERAL PHYSICAL EXAM: ICD-10-CM

## 2018-11-16 PROBLEM — R60.0 BILATERAL LEG EDEMA: Status: RESOLVED | Noted: 2017-06-16 | Resolved: 2018-11-16

## 2018-11-16 PROBLEM — M75.121 COMPLETE TEAR OF RIGHT ROTATOR CUFF: Status: ACTIVE | Noted: 2018-11-16

## 2018-11-16 PROBLEM — K92.1 HEMATOCHEZIA: Status: RESOLVED | Noted: 2017-06-16 | Resolved: 2018-11-16

## 2018-11-16 PROBLEM — M25.561 ACUTE PAIN OF RIGHT KNEE: Status: RESOLVED | Noted: 2018-10-20 | Resolved: 2018-11-16

## 2018-11-16 PROCEDURE — 93005 ELECTROCARDIOGRAM TRACING: CPT

## 2018-11-16 PROCEDURE — 80053 COMPREHEN METABOLIC PANEL: CPT

## 2018-11-16 PROCEDURE — 85025 COMPLETE CBC W/AUTO DIFF WBC: CPT

## 2018-11-16 PROCEDURE — 83036 HEMOGLOBIN GLYCOSYLATED A1C: CPT

## 2018-11-16 PROCEDURE — 80061 LIPID PANEL: CPT

## 2018-11-16 PROCEDURE — 93010 ELECTROCARDIOGRAM REPORT: CPT | Performed by: INTERNAL MEDICINE

## 2018-11-16 PROCEDURE — 36415 COLL VENOUS BLD VENIPUNCTURE: CPT

## 2018-11-20 ENCOUNTER — TELEPHONE (OUTPATIENT)
Dept: INTERNAL MEDICINE CLINIC | Facility: CLINIC | Age: 58
End: 2018-11-20

## 2018-11-20 ENCOUNTER — TELEPHONE (OUTPATIENT)
Dept: ORTHOPEDICS CLINIC | Facility: CLINIC | Age: 58
End: 2018-11-20

## 2018-11-20 NOTE — TELEPHONE ENCOUNTER
Pt procedure/Surgery:  Rt shoulder Scope/SAD/RCR/ Bicep Tenodesis  Pt insurance/number to contact:   [employer]  ID# and group:  ID# [id number]   Group# [id number]  Dx: [phone]   M75.21  CPT: 23508, 27924, 81485  Surgeon: Silverio Jarrett

## 2018-12-05 ENCOUNTER — HOSPITAL ENCOUNTER (OUTPATIENT)
Facility: HOSPITAL | Age: 58
Setting detail: HOSPITAL OUTPATIENT SURGERY
Discharge: HOME | End: 2018-12-05
Attending: ORTHOPAEDIC SURGERY | Admitting: ORTHOPAEDIC SURGERY

## 2018-12-05 ENCOUNTER — ANESTHESIA EVENT (OUTPATIENT)
Dept: SURGERY | Facility: HOSPITAL | Age: 58
End: 2018-12-05

## 2018-12-05 ENCOUNTER — ANESTHESIA (OUTPATIENT)
Dept: SURGERY | Facility: HOSPITAL | Age: 58
End: 2018-12-05

## 2018-12-05 VITALS
OXYGEN SATURATION: 94 % | DIASTOLIC BLOOD PRESSURE: 75 MMHG | SYSTOLIC BLOOD PRESSURE: 132 MMHG | BODY MASS INDEX: 40.57 KG/M2 | HEART RATE: 73 BPM | TEMPERATURE: 98 F | HEIGHT: 63 IN | RESPIRATION RATE: 16 BRPM | WEIGHT: 229 LBS

## 2018-12-05 DIAGNOSIS — M75.21 BICEPS TENDONITIS, RIGHT: ICD-10-CM

## 2018-12-05 DIAGNOSIS — M75.121 COMPLETE TEAR OF RIGHT ROTATOR CUFF: ICD-10-CM

## 2018-12-05 LAB
GLUCOSE BLDC GLUCOMTR-MCNC: 128 MG/DL (ref 70–99)
GLUCOSE BLDC GLUCOMTR-MCNC: 137 MG/DL (ref 70–99)

## 2018-12-05 PROCEDURE — 3E0T3BZ INTRODUCTION OF ANESTHETIC AGENT INTO PERIPHERAL NERVES AND PLEXI, PERCUTANEOUS APPROACH: ICD-10-PCS | Performed by: ANESTHESIOLOGY

## 2018-12-05 PROCEDURE — 82962 GLUCOSE BLOOD TEST: CPT

## 2018-12-05 PROCEDURE — 0LM14ZZ REATTACHMENT OF RIGHT SHOULDER TENDON, PERCUTANEOUS ENDOSCOPIC APPROACH: ICD-10-PCS | Performed by: ORTHOPAEDIC SURGERY

## 2018-12-05 PROCEDURE — 64415 NJX AA&/STRD BRCH PLXS IMG: CPT | Performed by: ORTHOPAEDIC SURGERY

## 2018-12-05 PROCEDURE — 0RNJ4ZZ RELEASE RIGHT SHOULDER JOINT, PERCUTANEOUS ENDOSCOPIC APPROACH: ICD-10-PCS | Performed by: ORTHOPAEDIC SURGERY

## 2018-12-05 PROCEDURE — 0LS14ZZ REPOSITION RIGHT SHOULDER TENDON, PERCUTANEOUS ENDOSCOPIC APPROACH: ICD-10-PCS | Performed by: ORTHOPAEDIC SURGERY

## 2018-12-05 PROCEDURE — 76942 ECHO GUIDE FOR BIOPSY: CPT | Performed by: ORTHOPAEDIC SURGERY

## 2018-12-05 PROCEDURE — 99152 MOD SED SAME PHYS/QHP 5/>YRS: CPT | Performed by: ORTHOPAEDIC SURGERY

## 2018-12-05 DEVICE — ANCHOR SUT 5.5MM BCMPS CRKSCR: Type: IMPLANTABLE DEVICE | Site: SHOULDER | Status: FUNCTIONAL

## 2018-12-05 RX ORDER — DEXTROSE MONOHYDRATE 25 G/50ML
50 INJECTION, SOLUTION INTRAVENOUS
Status: DISCONTINUED | OUTPATIENT
Start: 2018-12-05 | End: 2018-12-05

## 2018-12-05 RX ORDER — HYDROCODONE BITARTRATE AND ACETAMINOPHEN 5; 325 MG/1; MG/1
1 TABLET ORAL AS NEEDED
Status: DISCONTINUED | OUTPATIENT
Start: 2018-12-05 | End: 2018-12-05

## 2018-12-05 RX ORDER — EPHEDRINE SULFATE 50 MG/ML
INJECTION, SOLUTION INTRAVENOUS AS NEEDED
Status: DISCONTINUED | OUTPATIENT
Start: 2018-12-05 | End: 2018-12-05 | Stop reason: SURG

## 2018-12-05 RX ORDER — FAMOTIDINE 20 MG/1
20 TABLET ORAL ONCE
Status: COMPLETED | OUTPATIENT
Start: 2018-12-05 | End: 2018-12-05

## 2018-12-05 RX ORDER — NALOXONE HYDROCHLORIDE 0.4 MG/ML
80 INJECTION, SOLUTION INTRAMUSCULAR; INTRAVENOUS; SUBCUTANEOUS AS NEEDED
Status: DISCONTINUED | OUTPATIENT
Start: 2018-12-05 | End: 2018-12-05

## 2018-12-05 RX ORDER — ROCURONIUM BROMIDE 10 MG/ML
INJECTION, SOLUTION INTRAVENOUS AS NEEDED
Status: DISCONTINUED | OUTPATIENT
Start: 2018-12-05 | End: 2018-12-05 | Stop reason: SURG

## 2018-12-05 RX ORDER — LIDOCAINE HYDROCHLORIDE 40 MG/ML
SOLUTION TOPICAL AS NEEDED
Status: DISCONTINUED | OUTPATIENT
Start: 2018-12-05 | End: 2018-12-05 | Stop reason: SURG

## 2018-12-05 RX ORDER — METOCLOPRAMIDE 10 MG/1
10 TABLET ORAL ONCE
Status: COMPLETED | OUTPATIENT
Start: 2018-12-05 | End: 2018-12-05

## 2018-12-05 RX ORDER — SODIUM CHLORIDE, SODIUM LACTATE, POTASSIUM CHLORIDE, CALCIUM CHLORIDE 600; 310; 30; 20 MG/100ML; MG/100ML; MG/100ML; MG/100ML
INJECTION, SOLUTION INTRAVENOUS CONTINUOUS
Status: DISCONTINUED | OUTPATIENT
Start: 2018-12-05 | End: 2018-12-05

## 2018-12-05 RX ORDER — MORPHINE SULFATE 10 MG/ML
6 INJECTION, SOLUTION INTRAMUSCULAR; INTRAVENOUS EVERY 10 MIN PRN
Status: DISCONTINUED | OUTPATIENT
Start: 2018-12-05 | End: 2018-12-05

## 2018-12-05 RX ORDER — HYDROCODONE BITARTRATE AND ACETAMINOPHEN 5; 325 MG/1; MG/1
2 TABLET ORAL AS NEEDED
Status: DISCONTINUED | OUTPATIENT
Start: 2018-12-05 | End: 2018-12-05

## 2018-12-05 RX ORDER — LIDOCAINE HYDROCHLORIDE 10 MG/ML
INJECTION, SOLUTION EPIDURAL; INFILTRATION; INTRACAUDAL; PERINEURAL AS NEEDED
Status: DISCONTINUED | OUTPATIENT
Start: 2018-12-05 | End: 2018-12-05 | Stop reason: SURG

## 2018-12-05 RX ORDER — GLYCOPYRROLATE 0.2 MG/ML
INJECTION INTRAMUSCULAR; INTRAVENOUS AS NEEDED
Status: DISCONTINUED | OUTPATIENT
Start: 2018-12-05 | End: 2018-12-05 | Stop reason: SURG

## 2018-12-05 RX ORDER — ONDANSETRON 2 MG/ML
4 INJECTION INTRAMUSCULAR; INTRAVENOUS EVERY 6 HOURS PRN
Status: DISCONTINUED | OUTPATIENT
Start: 2018-12-05 | End: 2018-12-05

## 2018-12-05 RX ORDER — DEXAMETHASONE SODIUM PHOSPHATE 4 MG/ML
VIAL (ML) INJECTION AS NEEDED
Status: DISCONTINUED | OUTPATIENT
Start: 2018-12-05 | End: 2018-12-05 | Stop reason: SURG

## 2018-12-05 RX ORDER — MORPHINE SULFATE 4 MG/ML
4 INJECTION, SOLUTION INTRAMUSCULAR; INTRAVENOUS EVERY 10 MIN PRN
Status: DISCONTINUED | OUTPATIENT
Start: 2018-12-05 | End: 2018-12-05

## 2018-12-05 RX ORDER — ONDANSETRON 2 MG/ML
INJECTION INTRAMUSCULAR; INTRAVENOUS AS NEEDED
Status: DISCONTINUED | OUTPATIENT
Start: 2018-12-05 | End: 2018-12-05 | Stop reason: SURG

## 2018-12-05 RX ORDER — MORPHINE SULFATE 4 MG/ML
2 INJECTION, SOLUTION INTRAMUSCULAR; INTRAVENOUS EVERY 10 MIN PRN
Status: DISCONTINUED | OUTPATIENT
Start: 2018-12-05 | End: 2018-12-05

## 2018-12-05 RX ORDER — ACETAMINOPHEN 500 MG
1000 TABLET ORAL ONCE
Status: COMPLETED | OUTPATIENT
Start: 2018-12-05 | End: 2018-12-05

## 2018-12-05 RX ORDER — HYDROCODONE BITARTRATE AND ACETAMINOPHEN 10; 325 MG/1; MG/1
1-2 TABLET ORAL EVERY 6 HOURS PRN
Qty: 40 TABLET | Refills: 0 | Status: SHIPPED | OUTPATIENT
Start: 2018-12-05 | End: 2019-03-08 | Stop reason: ALTCHOICE

## 2018-12-05 RX ORDER — ONDANSETRON 2 MG/ML
4 INJECTION INTRAMUSCULAR; INTRAVENOUS ONCE AS NEEDED
Status: DISCONTINUED | OUTPATIENT
Start: 2018-12-05 | End: 2018-12-05

## 2018-12-05 RX ADMIN — LIDOCAINE HYDROCHLORIDE 4 ML: 40 SOLUTION TOPICAL at 09:13:00

## 2018-12-05 RX ADMIN — GLYCOPYRROLATE 0.1 MG: 0.2 INJECTION INTRAMUSCULAR; INTRAVENOUS at 09:05:00

## 2018-12-05 RX ADMIN — GLYCOPYRROLATE 0.1 MG: 0.2 INJECTION INTRAMUSCULAR; INTRAVENOUS at 09:21:00

## 2018-12-05 RX ADMIN — ONDANSETRON 4 MG: 2 INJECTION INTRAMUSCULAR; INTRAVENOUS at 10:35:00

## 2018-12-05 RX ADMIN — DEXAMETHASONE SODIUM PHOSPHATE 4 MG: 4 MG/ML VIAL (ML) INJECTION at 09:21:00

## 2018-12-05 RX ADMIN — LIDOCAINE HYDROCHLORIDE 50 MG: 10 INJECTION, SOLUTION EPIDURAL; INFILTRATION; INTRACAUDAL; PERINEURAL at 09:12:00

## 2018-12-05 RX ADMIN — ROCURONIUM BROMIDE 10 MG: 10 INJECTION, SOLUTION INTRAVENOUS at 09:12:00

## 2018-12-05 RX ADMIN — SODIUM CHLORIDE, SODIUM LACTATE, POTASSIUM CHLORIDE, CALCIUM CHLORIDE: 600; 310; 30; 20 INJECTION, SOLUTION INTRAVENOUS at 10:35:00

## 2018-12-05 RX ADMIN — EPHEDRINE SULFATE 10 MG: 50 INJECTION, SOLUTION INTRAVENOUS at 09:18:00

## 2018-12-05 RX ADMIN — SODIUM CHLORIDE, SODIUM LACTATE, POTASSIUM CHLORIDE, CALCIUM CHLORIDE: 600; 310; 30; 20 INJECTION, SOLUTION INTRAVENOUS at 09:02:00

## 2018-12-05 RX ADMIN — SODIUM CHLORIDE, SODIUM LACTATE, POTASSIUM CHLORIDE, CALCIUM CHLORIDE: 600; 310; 30; 20 INJECTION, SOLUTION INTRAVENOUS at 10:36:00

## 2018-12-05 NOTE — INTERVAL H&P NOTE
Pre-op Diagnosis: Complete tear of right rotator cuff [M75.121]  Biceps tendonitis, right [M75.21]    The above referenced H&P was reviewed by Tomasz Daniels MD on 12/5/2018, the patient was examined and no significant changes have occurred in the pat

## 2018-12-05 NOTE — ANESTHESIA PREPROCEDURE EVALUATION
Anesthesia PreOp Note    HPI:     Juan Pablo Stafford is a 63year old female who presents for preoperative consultation requested by: Archie Barr MD    Date of Surgery: 12/5/2018    Procedure(s):  SHOULDER ARTHROSCOPY ROTATOR CUFF REPAIR  Indication: stop ASA on 11/29/18 Disp:  Rfl:  11/29/2018   METFORMIN HCL  MG Oral Tablet 24 Hr TAKE 2 TABLETS BY MOUTH  ONCE DAILY Disp: 180 tablet Rfl: 0 12/3/2018   VALSARTAN-HYDROCHLOROTHIAZIDE 80-12.5 MG Oral Tab TAKE 1 TABLET BY MOUTH  DAILY Disp: 90 tablet comment: 4 cig / 1 pack q3 days    Substance and Sexual Activity      Alcohol use:  Yes        Alcohol/week: 0.0 oz        Comment: social/weekends      Drug use: Yes        Types: Cannabis      Sexual activity: Not on file    Other Topics      Concerns: Patient  Discussed plan with:  CRNA      I have informed Cindy Henrymolly and/or legal guardian or family member of the nature of the anesthetic plan, benefits, risks including possible dental damage if relevant, major complications, and any alternative form

## 2018-12-05 NOTE — OPERATIVE REPORT
[facility]    PATIENT'S NAME: Siara Slider   ATTENDING PHYSICIAN: Dena Anderson MD   OPERATING PHYSICIAN: Dena Anderson MD   PATIENT ACCOUNT#:   [id number]    LOCATION:  [facility] PACU 7 [facility] 10  MEDICAL RECORD #:   [id number]       DATE Madison Epps had an interscalene block performed by the anesthesia service prior to going back to the operating room. After the prep and drape was complete, a time-out was performed.   Perioperative antibiotics were infused, confirmation of identity and laterality took The patient will be nonweightbearing on the right upper extremity. She will follow up in 2 days for her first postoperative appointment. She will begin a course of physical therapy at that time. She will call our office with any questions or concerns.

## 2018-12-05 NOTE — ANESTHESIA PROCEDURE NOTES
Peripheral Block    Anesthesiologist:  Karsten Little MD  Patient Location:  PACU  Site Identification: ultrasound guided, real time ultrasound guided and IMAGE STORED AND RETRIEVABLE    Reason for Block: at surgeon's request and post-op pain management

## 2018-12-05 NOTE — ANESTHESIA POSTPROCEDURE EVALUATION
Patient: Talha Berger    Procedure Summary     Date:  12/05/18 Room / Location:  [facility] OR  / [facility] OR    Anesthesia Start:  0901 Anesthesia Stop:  6029    Procedure:  SHOULDER ARTHROSCOPY ROTATOR CUFF REPAIR (Right Shoulder) Diagnosis:       Comp

## 2018-12-05 NOTE — ANESTHESIA PROCEDURE NOTES
ANESTHESIA INTUBATION  Urgency: elective      General Information and Staff    Patient location during procedure: OR  Anesthesiologist: Mary John MD  Resident/CRNA: Rosalinda Cosby CRNA  Performed: CRNA     Indications and Patient Condition  Ind

## 2018-12-05 NOTE — BRIEF OP NOTE
Pre-Operative Diagnosis: Complete tear of right rotator cuff [M75.121]  Biceps tendonitis, right [M75.21]     Post-Operative Diagnosis: Complete tear of right rotator cuff [M75.121] Biceps tendonitis, right [M75.21]      Procedure Performed:   Procedure(s)

## 2018-12-09 RX ORDER — METFORMIN HYDROCHLORIDE 500 MG/1
TABLET, EXTENDED RELEASE ORAL
Qty: 180 TABLET | Refills: 0 | Status: SHIPPED | OUTPATIENT
Start: 2018-12-09 | End: 2019-04-25

## 2018-12-09 RX ORDER — VALSARTAN AND HYDROCHLOROTHIAZIDE 80; 12.5 MG/1; MG/1
1 TABLET, FILM COATED ORAL DAILY
Qty: 90 TABLET | Refills: 0 | Status: SHIPPED | OUTPATIENT
Start: 2018-12-09 | End: 2019-04-25

## 2018-12-11 ENCOUNTER — OFFICE VISIT (OUTPATIENT)
Dept: ORTHOPEDICS CLINIC | Facility: CLINIC | Age: 58
End: 2018-12-11
Payer: COMMERCIAL

## 2018-12-11 DIAGNOSIS — M75.21 BICEPS TENDONITIS ON RIGHT: ICD-10-CM

## 2018-12-11 DIAGNOSIS — M75.121 COMPLETE TEAR OF RIGHT ROTATOR CUFF: Primary | ICD-10-CM

## 2018-12-11 PROCEDURE — 99024 POSTOP FOLLOW-UP VISIT: CPT | Performed by: ORTHOPAEDIC SURGERY

## 2018-12-11 PROCEDURE — 99212 OFFICE O/P EST SF 10 MIN: CPT | Performed by: ORTHOPAEDIC SURGERY

## 2018-12-11 RX ORDER — HYDROCODONE BITARTRATE AND ACETAMINOPHEN 5; 325 MG/1; MG/1
1 TABLET ORAL EVERY 6 HOURS PRN
Qty: 40 TABLET | Refills: 0 | Status: SHIPPED | OUTPATIENT
Start: 2018-12-11 | End: 2019-04-19 | Stop reason: ALTCHOICE

## 2018-12-11 RX ORDER — ONDANSETRON HYDROCHLORIDE 8 MG/1
8 TABLET, FILM COATED ORAL EVERY 8 HOURS PRN
Qty: 30 TABLET | Refills: 0 | Status: SHIPPED | OUTPATIENT
Start: 2018-12-11 | End: 2019-04-19 | Stop reason: ALTCHOICE

## 2018-12-13 ENCOUNTER — TELEPHONE (OUTPATIENT)
Dept: PHYSICAL THERAPY | Facility: HOSPITAL | Age: 58
End: 2018-12-13

## 2018-12-19 ENCOUNTER — OFFICE VISIT (OUTPATIENT)
Dept: PHYSICAL THERAPY | Age: 58
End: 2018-12-19
Attending: ORTHOPAEDIC SURGERY

## 2018-12-19 DIAGNOSIS — M75.21 BICEPS TENDONITIS ON RIGHT: ICD-10-CM

## 2018-12-19 DIAGNOSIS — M75.121 COMPLETE TEAR OF RIGHT ROTATOR CUFF: ICD-10-CM

## 2018-12-19 PROCEDURE — 97163 PT EVAL HIGH COMPLEX 45 MIN: CPT | Performed by: PHYSICAL THERAPIST

## 2018-12-19 PROCEDURE — 97110 THERAPEUTIC EXERCISES: CPT | Performed by: PHYSICAL THERAPIST

## 2018-12-19 NOTE — PROGRESS NOTES
POST-OP SHOULDER EVALUATION:   Referring Physician: Dr. Hemal Gilbert  Diagnosis:  Complete tear of right rotator cuff (M75.121)  Biceps tendonitis on right (M75.21)   Date of Service: 12/19/2018     PATIENT SUMMARY:   Isa Aguirre is a 63year old y/o female will report improved ability to sleep without waking due to shoulder pain  · Pt will improve shoulder flexion AROM to >120 degrees to be able to reach into overhead cabinets without pain or restriction  · Pt will improve shoulder abduction AROM to >90 degr [phone]    Sincerely,  Electronically signed by therapist: Harshad Meyers, PT, DPT, OCS, Cert MDT     [de-identified] certification required: Yes  I certify the need for these services furnished under this plan of treatment and while under my care.

## 2018-12-21 ENCOUNTER — OFFICE VISIT (OUTPATIENT)
Dept: PHYSICAL THERAPY | Age: 58
End: 2018-12-21
Attending: ORTHOPAEDIC SURGERY

## 2018-12-21 PROCEDURE — 97110 THERAPEUTIC EXERCISES: CPT | Performed by: PHYSICAL THERAPIST

## 2018-12-21 NOTE — PROGRESS NOTES
Diagnosis: Complete tear of right rotator cuff (M75.121)  Biceps tendonitis on right (M75.21)     MD Brennan  Surgery date 11/5/18  Authorized # of Visits:  25         Next MD visit: none scheduled  Fall Risk: standard         Precautions: n/a           Me at side; ABD max 60-80?  without rotation  No resisted motions of the shoulder until 12 weeks post-op   strengthening  No canes/pulleys until 6 weeks post-op, because these are active-assistive exercises  Heat before PT, ice after PT    Plan: pt to cont

## 2018-12-26 ENCOUNTER — APPOINTMENT (OUTPATIENT)
Dept: PHYSICAL THERAPY | Age: 58
End: 2018-12-26
Attending: ORTHOPAEDIC SURGERY

## 2018-12-27 ENCOUNTER — OFFICE VISIT (OUTPATIENT)
Dept: PHYSICAL THERAPY | Age: 58
End: 2018-12-27
Attending: ORTHOPAEDIC SURGERY

## 2018-12-27 PROCEDURE — 97110 THERAPEUTIC EXERCISES: CPT

## 2018-12-27 NOTE — PROGRESS NOTES
Diagnosis: Complete tear of right rotator cuff (M75.121)  Biceps tendonitis on right (M75.21)     MD Brennan  Surgery date 11/5/18  Authorized # of Visits:  [id number]         Next MD visit: none scheduled-   Surgery date: 12/3/18  Fall Risk: standard         Prec demonstrate increased mid/low trap strength to 4/5 to promote improved shoulder mechanics and stabilization with ADL such as lifting and reaching  · Pt will be independent and compliant with comprehensive HEP to maintain progress achieved in PT     [employer]OCO

## 2019-01-03 ENCOUNTER — OFFICE VISIT (OUTPATIENT)
Dept: PHYSICAL THERAPY | Age: 59
End: 2019-01-03
Attending: INTERNAL MEDICINE

## 2019-01-03 ENCOUNTER — APPOINTMENT (OUTPATIENT)
Dept: PHYSICAL THERAPY | Age: 59
End: 2019-01-03
Attending: ORTHOPAEDIC SURGERY

## 2019-01-03 PROCEDURE — 97110 THERAPEUTIC EXERCISES: CPT

## 2019-01-03 NOTE — PROGRESS NOTES
Diagnosis: Complete tear of right rotator cuff (M75.121)  Biceps tendonitis on right (M75.21)  Status post right shoulder arthroscopy, subacromial decompression, rotator cuff repair, and biceps tenodesis.     Next MD visit: 1/4/19  Surgery Date: 12/5/18

## 2019-01-04 ENCOUNTER — APPOINTMENT (OUTPATIENT)
Dept: PHYSICAL THERAPY | Age: 59
End: 2019-01-04
Attending: ORTHOPAEDIC SURGERY

## 2019-01-04 ENCOUNTER — OFFICE VISIT (OUTPATIENT)
Dept: ORTHOPEDICS CLINIC | Facility: CLINIC | Age: 59
End: 2019-01-04
Payer: COMMERCIAL

## 2019-01-04 DIAGNOSIS — M75.21 BICEPS TENDONITIS ON RIGHT: ICD-10-CM

## 2019-01-04 DIAGNOSIS — M75.121 COMPLETE TEAR OF RIGHT ROTATOR CUFF: Primary | ICD-10-CM

## 2019-01-04 PROCEDURE — 99212 OFFICE O/P EST SF 10 MIN: CPT | Performed by: ORTHOPAEDIC SURGERY

## 2019-01-04 PROCEDURE — 99024 POSTOP FOLLOW-UP VISIT: CPT | Performed by: ORTHOPAEDIC SURGERY

## 2019-01-04 NOTE — PROGRESS NOTES
This is a pleasant 45-year-old female that is 3 weeks status post right shoulder arthroscopy, subacromial decompression, rotator cuff repair, biceps tenodesis. She has been decreased pain of physical therapy and returns today for repeat evaluation.   She h

## 2019-01-07 ENCOUNTER — OFFICE VISIT (OUTPATIENT)
Dept: PHYSICAL THERAPY | Age: 59
End: 2019-01-07
Attending: INTERNAL MEDICINE

## 2019-01-07 PROCEDURE — 97110 THERAPEUTIC EXERCISES: CPT

## 2019-01-07 NOTE — PROGRESS NOTES
Diagnosis: Complete tear of right rotator cuff (M75.121)  Biceps tendonitis on right (M75.21)  Status post right shoulder arthroscopy, subacromial decompression, rotator cuff repair, and biceps tenodesis.     Next MD visit: ? Surgery Date: 12/5/18    Lisa FARAH

## 2019-01-09 ENCOUNTER — OFFICE VISIT (OUTPATIENT)
Dept: PHYSICAL THERAPY | Age: 59
End: 2019-01-09
Attending: INTERNAL MEDICINE

## 2019-01-09 ENCOUNTER — APPOINTMENT (OUTPATIENT)
Dept: PHYSICAL THERAPY | Age: 59
End: 2019-01-09
Attending: ORTHOPAEDIC SURGERY

## 2019-01-09 PROCEDURE — 97110 THERAPEUTIC EXERCISES: CPT

## 2019-01-09 NOTE — PROGRESS NOTES
Diagnosis: Complete tear of right rotator cuff (M75.121)  Biceps tendonitis on right (M75.21)  Status post right shoulder arthroscopy, subacromial decompression, rotator cuff repair, and biceps tenodesis.     Next MD visit: 1/26/19   Surgery Date: 12/5/18

## 2019-01-11 ENCOUNTER — OFFICE VISIT (OUTPATIENT)
Dept: PHYSICAL THERAPY | Age: 59
End: 2019-01-11
Attending: ORTHOPAEDIC SURGERY

## 2019-01-11 ENCOUNTER — APPOINTMENT (OUTPATIENT)
Dept: PHYSICAL THERAPY | Age: 59
End: 2019-01-11
Attending: ORTHOPAEDIC SURGERY

## 2019-01-11 PROCEDURE — 97110 THERAPEUTIC EXERCISES: CPT

## 2019-01-14 ENCOUNTER — OFFICE VISIT (OUTPATIENT)
Dept: PHYSICAL THERAPY | Age: 59
End: 2019-01-14
Attending: INTERNAL MEDICINE

## 2019-01-14 PROCEDURE — 97110 THERAPEUTIC EXERCISES: CPT

## 2019-01-14 NOTE — PROGRESS NOTES
Diagnosis: Complete tear of right rotator cuff (M75.121)  Biceps tendonitis on right (M75.21)  Status post right shoulder arthroscopy, subacromial decompression, rotator cuff repair, and biceps tenodesis.     Next MD visit: 1/26/19   Surgery Date: 12/5/18 Treatment Time: 56 min

## 2019-01-16 ENCOUNTER — APPOINTMENT (OUTPATIENT)
Dept: PHYSICAL THERAPY | Age: 59
End: 2019-01-16
Attending: ORTHOPAEDIC SURGERY

## 2019-01-16 ENCOUNTER — OFFICE VISIT (OUTPATIENT)
Dept: PHYSICAL THERAPY | Age: 59
End: 2019-01-16
Attending: INTERNAL MEDICINE

## 2019-01-16 PROCEDURE — 97110 THERAPEUTIC EXERCISES: CPT

## 2019-01-16 NOTE — PROGRESS NOTES
Diagnosis: Complete tear of right rotator cuff (M75.121)  Biceps tendonitis on right (M75.21)  Status post right shoulder arthroscopy, subacromial decompression, rotator cuff repair, and biceps tenodesis.     Next MD visit: 1/26/19   Surgery Date: 12/5/18 min  Total Treatment Time: 55 min

## 2019-01-18 ENCOUNTER — APPOINTMENT (OUTPATIENT)
Dept: PHYSICAL THERAPY | Age: 59
End: 2019-01-18
Attending: ORTHOPAEDIC SURGERY

## 2019-01-21 ENCOUNTER — OFFICE VISIT (OUTPATIENT)
Dept: PHYSICAL THERAPY | Age: 59
End: 2019-01-21
Attending: INTERNAL MEDICINE

## 2019-01-21 PROCEDURE — 97110 THERAPEUTIC EXERCISES: CPT

## 2019-01-21 NOTE — PROGRESS NOTES
Diagnosis: Complete tear of right rotator cuff (M75.121)  Biceps tendonitis on right (M75.21)  Status post right shoulder arthroscopy, subacromial decompression, rotator cuff repair, and biceps tenodesis.     Next MD visit: 1/26/19   Surgery Date: 12/5/18 minutes - heat pad at start of session to R shoulder 10 minutes                 Assessment: Initiated shoulder AAROM exercise today.      Plan: continue PT per protocol    Charges: Ex 2      Total Timed Treatment: 35 min  Total Treatment Time: 55 min

## 2019-01-23 ENCOUNTER — OFFICE VISIT (OUTPATIENT)
Dept: PHYSICAL THERAPY | Age: 59
End: 2019-01-23
Attending: INTERNAL MEDICINE

## 2019-01-23 ENCOUNTER — APPOINTMENT (OUTPATIENT)
Dept: PHYSICAL THERAPY | Age: 59
End: 2019-01-23
Attending: ORTHOPAEDIC SURGERY

## 2019-01-23 PROCEDURE — 97110 THERAPEUTIC EXERCISES: CPT

## 2019-01-23 NOTE — PROGRESS NOTES
Physical Therapy Progress Note    Diagnosis: Complete tear of right rotator cuff (M75.121)  Biceps tendonitis on right (M75.21)  Status post right shoulder arthroscopy, subacromial decompression, rotator cuff repair, and biceps tenodesis.     Next MD visit: elbow flx/ext 10x  - supine R wrist flx/ext 30x  - supine R / ball squeeze 30x   - supine R PROM shoulder motions x 30 minutes into flx/abd/ER (per protocol)  - standing R shoulder pendulums AP, ML, CW/CCW 25x ea   - supine R PROM shoulder motions x 30

## 2019-01-25 ENCOUNTER — APPOINTMENT (OUTPATIENT)
Dept: PHYSICAL THERAPY | Age: 59
End: 2019-01-25
Attending: ORTHOPAEDIC SURGERY

## 2019-01-25 ENCOUNTER — OFFICE VISIT (OUTPATIENT)
Dept: ORTHOPEDICS CLINIC | Facility: CLINIC | Age: 59
End: 2019-01-25
Payer: COMMERCIAL

## 2019-01-25 DIAGNOSIS — M75.121 COMPLETE TEAR OF RIGHT ROTATOR CUFF: Primary | ICD-10-CM

## 2019-01-25 DIAGNOSIS — M75.21 BICEPS TENDONITIS ON RIGHT: ICD-10-CM

## 2019-01-25 PROCEDURE — 99024 POSTOP FOLLOW-UP VISIT: CPT | Performed by: ORTHOPAEDIC SURGERY

## 2019-01-25 PROCEDURE — 99212 OFFICE O/P EST SF 10 MIN: CPT | Performed by: ORTHOPAEDIC SURGERY

## 2019-01-25 NOTE — PROGRESS NOTES
This is a pleasant 60-year-old female that is 6 weeks status post right shoulder arthroscopy, subacromial decompression, rotator cuff repair, biceps tenodesis. The patient has had no chest pain or shortness of breath.   She has been participated in [employer]

## 2019-01-28 ENCOUNTER — OFFICE VISIT (OUTPATIENT)
Dept: PHYSICAL THERAPY | Age: 59
End: 2019-01-28
Attending: INTERNAL MEDICINE

## 2019-01-28 PROCEDURE — 97110 THERAPEUTIC EXERCISES: CPT

## 2019-01-28 NOTE — PROGRESS NOTES
Diagnosis: Complete tear of right rotator cuff (M75.121)  Biceps tendonitis on right (M75.21)  Status post right shoulder arthroscopy, subacromial decompression, rotator cuff repair, and biceps tenodesis.     Next MD visit: March 8, 2019  Surgery Date: 12/5 shoulder pendulums AP, ML, CW/CCW 25x ea   - supine R PROM shoulder motions x 30 minutes into flx/abd/ER (per protocol)  - standing R shoulder pendulums AP, ML, CW/CCW 25x ea   Manual Therapy            Therapeutic Activity            Modalities   - heat p

## 2019-01-30 ENCOUNTER — APPOINTMENT (OUTPATIENT)
Dept: PHYSICAL THERAPY | Age: 59
End: 2019-01-30
Attending: ORTHOPAEDIC SURGERY

## 2019-01-30 ENCOUNTER — APPOINTMENT (OUTPATIENT)
Dept: PHYSICAL THERAPY | Age: 59
End: 2019-01-30
Attending: INTERNAL MEDICINE

## 2019-02-01 ENCOUNTER — APPOINTMENT (OUTPATIENT)
Dept: PHYSICAL THERAPY | Age: 59
End: 2019-02-01
Attending: ORTHOPAEDIC SURGERY

## 2019-02-04 ENCOUNTER — OFFICE VISIT (OUTPATIENT)
Dept: PHYSICAL THERAPY | Age: 59
End: 2019-02-04
Attending: ORTHOPAEDIC SURGERY

## 2019-02-04 PROCEDURE — 97110 THERAPEUTIC EXERCISES: CPT

## 2019-02-04 NOTE — PROGRESS NOTES
Diagnosis: Complete tear of right rotator cuff (M75.121)  Biceps tendonitis on right (M75.21)  Status post right shoulder arthroscopy, subacromial decompression, rotator cuff repair, and biceps tenodesis.     Next MD visit: March 8, 2019  Surgery Date: 12/5 R PROM shoulder  30 minutes into flx/abd/ER (per protocol)  - standing R shoulder pendulums AP, ML, CW/CCW 30x ea   - supine R PROM shoulder motions x 30 minutes into flx/abd/ER (per protocol)  - standing R shoulder pendulums AP, ML, CW/CCW 30x ea  - supin

## 2019-02-05 ENCOUNTER — APPOINTMENT (OUTPATIENT)
Dept: PHYSICAL THERAPY | Age: 59
End: 2019-02-05
Attending: ORTHOPAEDIC SURGERY

## 2019-02-06 ENCOUNTER — OFFICE VISIT (OUTPATIENT)
Dept: PHYSICAL THERAPY | Age: 59
End: 2019-02-06
Attending: ORTHOPAEDIC SURGERY

## 2019-02-06 ENCOUNTER — APPOINTMENT (OUTPATIENT)
Dept: PHYSICAL THERAPY | Age: 59
End: 2019-02-06
Attending: ORTHOPAEDIC SURGERY

## 2019-02-06 PROCEDURE — 97110 THERAPEUTIC EXERCISES: CPT

## 2019-02-06 NOTE — PROGRESS NOTES
Diagnosis: Complete tear of right rotator cuff (M75.121)  Biceps tendonitis on right (M75.21)  Status post right shoulder arthroscopy, subacromial decompression, rotator cuff repair, and biceps tenodesis.     Next MD visit: March 8, 2019  Surgery Date: 12/5 AROM 2x10  - prone R shoulder extension 1x10  - prone R scap rows 1x10  - standing B shoulder extension with wand 2x10 - supine R PROM shoulder into flx, abd, ER x 20 minutes   - standing R shoulder pendulums AP, ML, CW/CCW 30x ea  - supine AAROM B shoulde shoulder at end of session 10 minutes - heat pad at start of session   To R shoulder 10 minutes  - cold pack to R shoulder at end of session 10 minutes - heat pad at start of session   To R shoulder 10 minutes - heat pad at start of session   To R shoulder

## 2019-02-11 ENCOUNTER — OFFICE VISIT (OUTPATIENT)
Dept: PHYSICAL THERAPY | Age: 59
End: 2019-02-11
Attending: ORTHOPAEDIC SURGERY

## 2019-02-11 PROCEDURE — 97110 THERAPEUTIC EXERCISES: CPT

## 2019-02-11 NOTE — PROGRESS NOTES
Diagnosis: Complete tear of right rotator cuff (M75.121)  Biceps tendonitis on right (M75.21)  Status post right shoulder arthroscopy, subacromial decompression, rotator cuff repair, and biceps tenodesis.     Next MD visit: March 8, 2019  Surgery Date: 12/5 with AAS rhythmic stabilization ER/IR x 20 each AROM  - supine R shoulder rhythmic stabilization 90 degree flexion up/down 2 x 20 each AROM  - sidelying R shoulder ER AROM 2 x 10  - seated chest press AAROM with cane 1 x 10   - B shoulder flex AAROM with t supine R PROM shoulder motions x 30 minutes into flx/abd/ER (per protocol)  - standing R shoulder pendulums AP, ML, CW/CCW 25x ea   Manual Therapy               Therapeutic Activity               Modalities   - heat pad at start of session   To R shoulder

## 2019-02-13 ENCOUNTER — OFFICE VISIT (OUTPATIENT)
Dept: PHYSICAL THERAPY | Age: 59
End: 2019-02-13
Attending: ORTHOPAEDIC SURGERY

## 2019-02-13 ENCOUNTER — APPOINTMENT (OUTPATIENT)
Dept: PHYSICAL THERAPY | Age: 59
End: 2019-02-13
Attending: ORTHOPAEDIC SURGERY

## 2019-02-13 PROCEDURE — 97110 THERAPEUTIC EXERCISES: CPT

## 2019-02-13 NOTE — PROGRESS NOTES
Diagnosis: Complete tear of right rotator cuff (M75.121)  Biceps tendonitis on right (M75.21)  Status post right shoulder arthroscopy, subacromial decompression, rotator cuff repair, and biceps tenodesis.     Next MD visit: March 8, 2019  Surgery Date: 12/5 extension with Vabaduse 41 1 x 10   - standing B narrow high row with Vabaduse 41 1 x 10   - prone R scap rows 1x10  - prone R shoulder extension 1x10 - standing R shoulder pendulum A/P, M/L, CW/CCW x 30 each  - supine B shoulder flexion AAROM with cane 2 x 10  - supine B flx/abd/ER (per protocol)  - standing R shoulder pendulums AP, ML, CW/CCW 30x ea   - supine R PROM shoulder motions x 30 minutes into flx/abd/ER (per protocol)  - standing R shoulder pendulums AP, ML, CW/CCW 30x ea  - supine R PROM R elbow flx/ext 10x  - s Timed Treatment: 45 min  Total Treatment Time: 65 min

## 2019-02-15 ENCOUNTER — APPOINTMENT (OUTPATIENT)
Dept: PHYSICAL THERAPY | Age: 59
End: 2019-02-15
Attending: ORTHOPAEDIC SURGERY

## 2019-02-18 ENCOUNTER — OFFICE VISIT (OUTPATIENT)
Dept: PHYSICAL THERAPY | Age: 59
End: 2019-02-18
Attending: ORTHOPAEDIC SURGERY

## 2019-02-18 PROCEDURE — 97110 THERAPEUTIC EXERCISES: CPT

## 2019-02-18 NOTE — PROGRESS NOTES
Diagnosis: Complete tear of right rotator cuff (M75.121)  Biceps tendonitis on right (M75.21)  Status post right shoulder arthroscopy, subacromial decompression, rotator cuff repair, and biceps tenodesis.     Next MD visit: March 8, 2019  Surgery Date: 12/5 narrow high row with Vabaduse 41 1 x 10   - prone R scap rows 1x15  - prone R shoulder extension 1x15 - standing R shoulder pendulum A/P, M/L, CW/CCW x 30 each  - supine B shoulder flexion AAROM with cane 2 x 10  - supine B chest press with cane 2 x 10   - supine shoulder into flx, abd, ER x 20 minutes   - standing R shoulder pendulums AP, ML, CW/CCW 30x ea  - supine AAROM B shoulder flexion 2x10  - supine R shoulder ER with AAROM 2x10  - standing B shoulder extension with wand 2x10 - supine R PROM shoulder 25 nilton shoulder 10 minutes  - cold pack to R shoulder at end of session 10 minutes - heat pad at start of session   To R shoulder 10 minutes  - cold pack to R shoulder at end of session 10 minutes - heat pad at start of session   To R shoulder 10 minutes - heat p

## 2019-02-20 ENCOUNTER — OFFICE VISIT (OUTPATIENT)
Dept: PHYSICAL THERAPY | Age: 59
End: 2019-02-20
Attending: ORTHOPAEDIC SURGERY

## 2019-02-20 ENCOUNTER — APPOINTMENT (OUTPATIENT)
Dept: PHYSICAL THERAPY | Age: 59
End: 2019-02-20
Attending: ORTHOPAEDIC SURGERY

## 2019-02-20 PROCEDURE — 97110 THERAPEUTIC EXERCISES: CPT

## 2019-02-20 NOTE — PROGRESS NOTES
Diagnosis: Complete tear of right rotator cuff (M75.121)  Biceps tendonitis on right (M75.21)  Status post right shoulder arthroscopy, subacromial decompression, rotator cuff repair, and biceps tenodesis.     Next MD visit: March 8, 2019  Surgery Date: 12/5 supine R shoulder ER/IR isometrics with AAS 10\" holds 1 x 10 each  - sidelying R shoulder ER AROM 2x10  - prone R scap rows 2x10  - prone R shoulder extension 2x10  - standing R shoulder ER/IR with AAS with towel and YTB 1 x 10 each  - standing B shoulder up/down 2 x 20 each AROM  - sidelying R shoulder ER AROM 2 x 10  - seated chest press AAROM with cane 1 x 10   - B shoulder flex AAROM with towel @ wall 2 x 10   - standing R shoulder ER/IR with AAS with towel and YTB 1 x 10 each  - standing B shoulder ext CW/CCW 25x ea   Manual Therapy                  Therapeutic Activity                  Modalities   - cold pack to R shoulder at end of session 10 minutes - cold pack to R shoulder at end of session 10 minutes - cold pack to R shoulder at end of session 10

## 2019-02-22 ENCOUNTER — APPOINTMENT (OUTPATIENT)
Dept: PHYSICAL THERAPY | Age: 59
End: 2019-02-22
Attending: ORTHOPAEDIC SURGERY

## 2019-02-25 ENCOUNTER — OFFICE VISIT (OUTPATIENT)
Dept: PHYSICAL THERAPY | Age: 59
End: 2019-02-25
Attending: ORTHOPAEDIC SURGERY

## 2019-02-25 PROCEDURE — 97110 THERAPEUTIC EXERCISES: CPT

## 2019-02-25 NOTE — PROGRESS NOTES
Diagnosis: Complete tear of right rotator cuff (M75.121)  Biceps tendonitis on right (M75.21)  Status post right shoulder arthroscopy, subacromial decompression, rotator cuff repair, and biceps tenodesis.     Next MD visit: March 8, 2019  Surgery Date: 12/5 prone shoulder extension 3x10  - supine R shoulder ER/IR isometrics with AAS and 45 degrees 10\" holds 1 x 10 each  - seated B shoulder flx with wand 1x10  - standing R shoulder ER/IR with YTB 1x15 ea  - standing B shoulder extension with Vabaduse 41 2 x 10   - st standing B shoulder extension with Vabaduse 41 1 x 10   - standing B narrow high row with Vabaduse 41 1 x 10   - prone R scap rows 1x10  - prone R shoulder extension 1x10 - standing R shoulder pendulum A/P, M/L, CW/CCW x 30 each  - supine B shoulder flexion AAROM with can shoulder  30 minutes into flx/abd/ER (per protocol)  - standing R shoulder pendulums AP, ML, CW/CCW 30x ea   - supine R PROM shoulder motions x 30 minutes into flx/abd/ER (per protocol)  - standing R shoulder pendulums AP, ML, CW/CCW 30x ea  - supine R PRO Assessment: Progressed intensity of current therapeutic exercises.     Plan: continue PT per protocol  - progress strengthening exercises as tolerated    Charges: Ex 3     Total Timed Treatment: 45 min  Total Treatment Time: 55 min

## 2019-02-26 ENCOUNTER — APPOINTMENT (OUTPATIENT)
Dept: PHYSICAL THERAPY | Age: 59
End: 2019-02-26
Attending: ORTHOPAEDIC SURGERY

## 2019-02-27 ENCOUNTER — APPOINTMENT (OUTPATIENT)
Dept: PHYSICAL THERAPY | Age: 59
End: 2019-02-27
Attending: ORTHOPAEDIC SURGERY

## 2019-02-27 ENCOUNTER — OFFICE VISIT (OUTPATIENT)
Dept: PHYSICAL THERAPY | Age: 59
End: 2019-02-27
Attending: ORTHOPAEDIC SURGERY

## 2019-02-27 PROCEDURE — 97110 THERAPEUTIC EXERCISES: CPT

## 2019-02-27 NOTE — PROGRESS NOTES
Diagnosis: Complete tear of right rotator cuff (M75.121)  Biceps tendonitis on right (M75.21)  Status post right shoulder arthroscopy, subacromial decompression, rotator cuff repair, and biceps tenodesis.     Next MD visit: March 8, 2019  Surgery Date: 12/5 2x10  - supine R shoulder flexion rhythmic stabilization AP, ML 20x  - supine R chest press 1# 2x10  - sidelying R shoulder ER AROM 3x10  - sidelying R shoulder abduction 2x12  - prone scap rows 1# 2x10  - prone shoulder extension 1# 2x10  - seated B shoul holds 2 x 10 each  - sidelying R shoulder ER AROM 2 x 10  - standing R shoulder ER/IR with AAS with towel and YTB 1 x 10 each  - standing B shoulder extension with Vabaduse 41 1 x 10   - standing B narrow high row with Vabaduse 41 1 x 10   - prone R scap rows 1x15  - pron shoulder flexion AROM 2x10  - supine B shoulder ER AROM 2x10  - prone R shoulder extension 1x10  - prone R scap rows 1x10  - standing B shoulder extension with wand 2x10 - supine R PROM shoulder into flx, abd, ER x 20 minutes   - standing R shoulder pendul session   To R shoulder 10 minutes  - cold pack to R shoulder at end of session 10 minutes - heat pad at start of session   To R shoulder 10 minutes  - cold pack to R shoulder at end of session 10 minutes - heat pad at start of session   To R shoulder 10 m

## 2019-03-04 ENCOUNTER — OFFICE VISIT (OUTPATIENT)
Dept: PHYSICAL THERAPY | Age: 59
End: 2019-03-04
Attending: ORTHOPAEDIC SURGERY
Payer: COMMERCIAL

## 2019-03-04 PROCEDURE — 97110 THERAPEUTIC EXERCISES: CPT

## 2019-03-04 NOTE — PROGRESS NOTES
Diagnosis: Complete tear of right rotator cuff (M75.121)  Biceps tendonitis on right (M75.21)  Status post right shoulder arthroscopy, subacromial decompression, rotator cuff repair, and biceps tenodesis.     Next MD visit: March 8, 2019  Surgery Date: 12/5 press with 1# 3x10  - supine R shoulder flexion AROM 2x10  - supine R shoulder flexion rhythmic stabilization AP, ML 25x  - sidelying R shoulder ER AROM 3x10  - sidelying R shoulder abduction 3x10  - prone scap rows 1# 2x10  - prone shoulder extension 1# 2 R shoulder flexion AROM 2x10  - supine R shoulder ER/IR isometrics with AAS 10\" holds 1 x 10 each  - sidelying R shoulder ER AROM 2x10  - prone R scap rows 2x10  - prone R shoulder extension 2x10  - standing R shoulder ER/IR with AAS with dominik and LB 1 stabilization 90 degree flexion up/down 2 x 20 each AROM  - sidelying R shoulder ER AROM 2 x 10  - seated chest press AAROM with cane 1 x 10   - B shoulder flex AAROM with towel @ wall 2 x 10   - standing R shoulder ER/IR with AAS with towel and YTB 1 x 10 R shoulder pendulums AP, ML, CW/CCW 25x ea   Manual Therapy                     Therapeutic Activity                     Modalities   - cold pack to R shoulder at end of session 10 minutes  - cold pack to R shoulder at end of session 10 minutes - cold pack

## 2019-03-06 ENCOUNTER — APPOINTMENT (OUTPATIENT)
Dept: PHYSICAL THERAPY | Age: 59
End: 2019-03-06
Attending: ORTHOPAEDIC SURGERY

## 2019-03-06 ENCOUNTER — OFFICE VISIT (OUTPATIENT)
Dept: PHYSICAL THERAPY | Age: 59
End: 2019-03-06
Attending: ORTHOPAEDIC SURGERY
Payer: COMMERCIAL

## 2019-03-06 PROCEDURE — 97110 THERAPEUTIC EXERCISES: CPT

## 2019-03-06 NOTE — PROGRESS NOTES
Physical Therapy Progress Report    Diagnosis: Complete tear of right rotator cuff (M75.121)  Biceps tendonitis on right (M75.21)  Status post right shoulder arthroscopy, subacromial decompression, rotator cuff repair, and biceps tenodesis.     Next MD harris supine R shoulder rhythmic stabilization with 1# AP, ML, CW/CCW 25x ea  - sidelying R shoulder ER with 1# 2x10  - sidelying R shoulder abduction 2x10  - prone R scap rows with 2# 1x15  - standing B narrow high rows with GSC 3 x 10  - standing B shoulder ex prone scap rows 1# 2x10  - prone shoulder extension 1# 2x10  - seated B shoulder flx with wand 2x10  - standing R shoulder ER/IR with YTB 2x10 ea  - standing B shoulder extension with Vabaduse 41 2 x 10   - standing B narrow high row with GSC 2 x 10   - standing R standing B narrow high row with Vabaduse 41 1 x 10   - prone R scap rows 1x15  - prone R shoulder extension 1x15 - standing R shoulder pendulum A/P, M/L, CW/CCW x 30 each  - supine B shoulder flexion AAROM with cane 2 x 10  - supine B chest press with cane 2 x 10 R PROM shoulder into flx, abd, ER x 20 minutes   - standing R shoulder pendulums AP, ML, CW/CCW 30x ea  - supine AAROM B shoulder flexion 2x10  - supine R shoulder ER with AAROM 2x10  - standing B shoulder extension with wand 2x10 - supine R PROM shoulder of session 10 minutes - heat pad at start of session   To R shoulder 10 minutes  - cold pack to R shoulder at end of session 10 minutes - heat pad at start of session   To R shoulder 10 minutes  - cold pack to R shoulder at end of session 10 minutes - heat visits over a 90 day period.  Treatment will include: therapeutic activity, therapeutic exercises, modalities, manual therapy      Patient was advised of these findings, precautions, and treatment options and has agreed to actively participate in planning a

## 2019-03-08 ENCOUNTER — OFFICE VISIT (OUTPATIENT)
Dept: ORTHOPEDICS CLINIC | Facility: CLINIC | Age: 59
End: 2019-03-08
Payer: COMMERCIAL

## 2019-03-08 ENCOUNTER — APPOINTMENT (OUTPATIENT)
Dept: PHYSICAL THERAPY | Age: 59
End: 2019-03-08
Attending: ORTHOPAEDIC SURGERY

## 2019-03-08 DIAGNOSIS — M75.21 BICEPS TENDONITIS ON RIGHT: ICD-10-CM

## 2019-03-08 DIAGNOSIS — M75.121 COMPLETE TEAR OF RIGHT ROTATOR CUFF: Primary | ICD-10-CM

## 2019-03-08 PROCEDURE — 99213 OFFICE O/P EST LOW 20 MIN: CPT | Performed by: ORTHOPAEDIC SURGERY

## 2019-03-08 PROCEDURE — 99212 OFFICE O/P EST SF 10 MIN: CPT | Performed by: ORTHOPAEDIC SURGERY

## 2019-03-08 NOTE — PROGRESS NOTES
This is a pleasant 52-year-old female that is 3 months status post right shoulder arthroscopy, subacromial decompression, rotator cuff repair, biceps tenodesis. Patient had no chest pain or shortness of breath.   She has been participating in physical ther

## 2019-03-11 ENCOUNTER — OFFICE VISIT (OUTPATIENT)
Dept: PHYSICAL THERAPY | Age: 59
End: 2019-03-11
Attending: INTERNAL MEDICINE
Payer: COMMERCIAL

## 2019-03-11 PROCEDURE — 97110 THERAPEUTIC EXERCISES: CPT

## 2019-03-11 NOTE — PROGRESS NOTES
Diagnosis: Complete tear of right rotator cuff (M75.121)  Biceps tendonitis on right (M75.21)  Status post right shoulder arthroscopy, subacromial decompression, rotator cuff repair, and biceps tenodesis.     Next MD visit: March 8, 2019  Surgery Date: 12/5 standing R shoulder ER/IR with RTB 2x10 ea  - standing B shoulder ext with wand 3x10   - supine R shoulder PROM x 15 minutes  - standing pendulums 2# R shoulder AP, ML, CW/CCW 20x ea  - supine B shoulder flexion with wand 3x10  - supine R shoulder flexion flexion/abduction 25x ea - supine R shoulder PROM x 10 minutes   - supine B chest press with wand 3x10  - supine R shoulder flexion AROM 2x10  - supine R shoulder flexion rhythmic stabilization AP, ML 20x  - supine R chest press 1# 2x10  - sidelying R shou supine B shoulder flexion with wand AAROM 2x10  - supine R shoulder flexion AROM 1x10  - supine R shoulder ER/IR isometrics with AAS 5\" holds 2 x 10 each  - sidelying R shoulder ER AROM 2 x 10  - standing R shoulder ER/IR with AAS with towel and YTB 1 x 1 ea  - supine AAROM B shoulder flexion 2x10  - supine AAROM B chest press with wand 2x10  - supine R shoulder ER with AAROM 3x10  - supine B shoulder flexion AROM 2x10  - supine B shoulder ER AROM 2x10  - prone R shoulder extension 1x10  - prone R scap rows - cold pack to R shoulder at end of session 10 minutes - heat pad at start of session   To R shoulder 10 minutes  - cold pack to R shoulder at end of session 10 minutes - heat pad at start of session   To R shoulder 10 minutes  - cold pack to R shoulder at with ADLs including dressing and grooming.- IN PROGRESS  · Pt will demonstrate increased mid/low trap strength to 4/5 to promote improved shoulder mechanics and stabilization with ADL such as lifting and reaching- IN PROGRESS  · Pt will be independent and

## 2019-03-13 ENCOUNTER — OFFICE VISIT (OUTPATIENT)
Dept: PHYSICAL THERAPY | Age: 59
End: 2019-03-13
Attending: ORTHOPAEDIC SURGERY
Payer: COMMERCIAL

## 2019-03-13 PROCEDURE — 97110 THERAPEUTIC EXERCISES: CPT

## 2019-03-13 NOTE — PROGRESS NOTES
Diagnosis: Complete tear of right rotator cuff (M75.121)  Biceps tendonitis on right (M75.21)  Status post right shoulder arthroscopy, subacromial decompression, rotator cuff repair, and biceps tenodesis.     Next MD visit: March 8, 2019  Surgery Date: 12/5 1x10  - supine R shoulder rhythmic stabilization with 1# AP, ML, CW/CCW 25x ea  - sidelying R shoulder ER with 1# 2x10  - sidelying R shoulder abduction 0# 2x10  - prone R scap rows with 2# 2x10  - prone R shoulder ext with 1# 2x10  - standing B shoulder f 2x10  - supine R shoulder flexion rhythmic stabilization AP, ML 25x  - sidelying R shoulder ER AROM 3x10  - sidelying R shoulder abduction 3x10  - prone scap rows 1# 2x10  - prone shoulder extension 1# 2x10  - standing R shoulder ER/IR with YTB: not tested ER/IR isometrics with AAS 10\" holds 1 x 10 each  - sidelying R shoulder ER AROM 2x10  - prone R scap rows 2x10  - prone R shoulder extension 2x10  - standing R shoulder ER/IR with AAS with towel and YTB 1 x 10 each  - standing B shoulder extension with RS AROM  - sidelying R shoulder ER AROM 2 x 10  - seated chest press AAROM with cane 1 x 10   - B shoulder flex AAROM with towel @ wall 2 x 10   - standing R shoulder ER/IR with AAS with towel and YTB 1 x 10 each  - standing B shoulder extension with Vabaduse 41 2 x Therapy                        Therapeutic Activity                        Modalities   - cold pack to R shoulder at end of session 10 minutes - cold pack to R shoulder at end of session 10 minutes - cold pack to R shoulder at end of session 10 minutes - c without pain or restriction _ PARTIALLY MET  · Pt will improve shoulder abduction AROM to >90 degrees to improve ability to don deodorant, don/doff shirts, and wash hair -   · Pt will increase shoulder AROM to 85 deg ER and 90 deg ABD to reach and fasten s

## 2019-03-18 ENCOUNTER — OFFICE VISIT (OUTPATIENT)
Dept: PHYSICAL THERAPY | Age: 59
End: 2019-03-18
Attending: ORTHOPAEDIC SURGERY
Payer: COMMERCIAL

## 2019-03-18 PROCEDURE — 97110 THERAPEUTIC EXERCISES: CPT

## 2019-03-18 NOTE — PROGRESS NOTES
Diagnosis: Complete tear of right rotator cuff (M75.121)  Biceps tendonitis on right (M75.21)  Status post right shoulder arthroscopy, subacromial decompression, rotator cuff repair, and biceps tenodesis.     Next MD visit: March 8, 2019  Surgery Date: 12/5 AROM flexion 2x10  - supine R shoulder rhythmic stabilization with 1# AP, ML, CW/CCW 25x ea  - sidelying R shoulder ER with 1# 3x10  - sidelying R shoulder abduction 1# 2x10  - prone R scap rows with 2# 3x10  - prone R shoulder ext with 1# 3x10  - seated O stabilization AP, ML 25x  - sidelying R shoulder ER AROM 1# 25x  - sidelying R shoulder abduction 1x10  - prone scap rows 1# 2x10  - prone shoulder extension 1# 2x10  - standing R shoulder ER/IR with YTB 2x12 ea  - standing B shoulder extension with Vabaduse 41 2 3x10  - prone shoulder extension 3x10  - supine R shoulder ER/IR isometrics with AAS and 45 degrees 10\" holds 1 x 10 each  - seated B shoulder flx with wand 1x10  - standing R shoulder ER/IR with YTB 1x15 ea  - standing B shoulder extension with Vabaduse 41 2 x 1 each  - standing B shoulder extension with Vabaduse 41 1 x 10   - standing B narrow high row with Vabaduse 41 1 x 10   - prone R scap rows 1x10  - prone R shoulder extension 1x10 - standing R shoulder pendulum A/P, M/L, CW/CCW x 30 each  - supine B shoulder flexion AAROM shoulder  30 minutes into flx/abd/ER (per protocol)  - standing R shoulder pendulums AP, ML, CW/CCW 30x ea   - supine R PROM shoulder motions x 30 minutes into flx/abd/ER (per protocol)  - standing R shoulder pendulums AP, ML, CW/CCW 30x ea  - supine R PRO minutes  - cold pack to R shoulder at end of session 10 minutes - heat pad at start of session   To R shoulder 10 minutes - heat pad at start of session   To R shoulder 10 minutes  - cold pack at end of session to R shoulder 10 minutes - heat pad at start

## 2019-03-20 ENCOUNTER — OFFICE VISIT (OUTPATIENT)
Dept: PHYSICAL THERAPY | Age: 59
End: 2019-03-20
Attending: ORTHOPAEDIC SURGERY
Payer: COMMERCIAL

## 2019-03-20 PROCEDURE — 97110 THERAPEUTIC EXERCISES: CPT

## 2019-03-20 NOTE — PROGRESS NOTES
Diagnosis: Complete tear of right rotator cuff (M75.121)  Biceps tendonitis on right (M75.21)  Status post right shoulder arthroscopy, subacromial decompression, rotator cuff repair, and biceps tenodesis.     Next MD visit: March 8, 2019  Surgery Date: 12/5 1# 3x10  - prone R scap rows with 2# 3x10  - prone R shoulder ext with 2# 2x10  - seated OH pulleys into flexion and abduction 30x ea   standing B narrow high rows with GSC 3 x 10  - standing B shoulder extension with GSC 2x10  - standing R shoulder ER/IR 1# AP, ML, CW/CCW 25x ea  - sidelying R shoulder ER with 1# 2x10  - sidelying R shoulder abduction 2x10  - prone R scap rows with 2# 1x15  - standing B narrow high rows with GSC 3 x 10  - standing B shoulder extension with Vabaduse 41 3x10  - standing R shoulder E extension 1# 2x10  - seated B shoulder flx with wand 2x10  - standing R shoulder ER/IR with YTB 2x10 ea  - standing B shoulder extension with Vabaduse 41 2 x 10   - standing B narrow high row with GSC 2 x 10   - standing R shoulder wall wash with towel into flexio - prone R scap rows 1x15  - prone R shoulder extension 1x15 - standing R shoulder pendulum A/P, M/L, CW/CCW x 30 each  - supine B shoulder flexion AAROM with cane 2 x 10  - supine B chest press with cane 2 x 10   - supine R shoulder ER with cane 2 x 10 minutes   - standing R shoulder pendulums AP, ML, CW/CCW 30x ea  - supine AAROM B shoulder flexion 2x10  - supine R shoulder ER with AAROM 2x10  - standing B shoulder extension with wand 2x10 - supine R PROM shoulder 25 minutes   - standing R shoulder pend start of session   To R shoulder 10 minutes  - cold pack to R shoulder at end of session 10 minutes - heat pad at start of session   To R shoulder 10 minutes  - cold pack to R shoulder at end of session 10 minutes - heat pad at start of session   To R shou grooming.- IN PROGRESS  · Pt will demonstrate increased mid/low trap strength to 4/5 to promote improved shoulder mechanics and stabilization with ADL such as lifting and reaching- IN PROGRESS  · Pt will be independent and compliant with comprehensive HEP

## 2019-03-26 ENCOUNTER — OFFICE VISIT (OUTPATIENT)
Dept: PHYSICAL THERAPY | Age: 59
End: 2019-03-26
Attending: ORTHOPAEDIC SURGERY
Payer: COMMERCIAL

## 2019-03-26 PROCEDURE — 97110 THERAPEUTIC EXERCISES: CPT

## 2019-03-26 NOTE — PROGRESS NOTES
Diagnosis: Complete tear of right rotator cuff (M75.121)  Biceps tendonitis on right (M75.21)  Status post right shoulder arthroscopy, subacromial decompression, rotator cuff repair, and biceps tenodesis.     Next MD visit: March 8, 2019  Surgery Date: 12/5 under elbow 1# DB 1 x 10, 2# DB 2 x 10    - supine R shoulder PROM x 18 minutes  - supine B shoulder flexion with wand 3x10  - supine R chest press 2# 3x10  - supine R shoulder flexion 2# 25x  - supine R shoulder rhythmic stabilization with 2# AP, ML,  CW/ flexion AROM 1x10  - supine R shoulder rhythmic stabilization with 1# AP, ML, CW/CCW 25x ea  - sidelying R shoulder ER with 1# 2x10  - sidelying R shoulder abduction 0# 2x10  - prone R scap rows with 2# 2x10  - prone R shoulder ext with 1# 2x10  - standing flexion AROM 2x10  - supine R shoulder flexion rhythmic stabilization AP, ML 25x  - sidelying R shoulder ER AROM 3x10  - sidelying R shoulder abduction 3x10  - prone scap rows 1# 2x10  - prone shoulder extension 1# 2x10  - standing R shoulder ER/IR with YT shoulder ER/IR isometrics with AAS 10\" holds 1 x 10 each  - sidelying R shoulder ER AROM 2x10  - prone R scap rows 2x10  - prone R shoulder extension 2x10  - standing R shoulder ER/IR with AAS with towel and YTB 1 x 10 each  - standing B shoulder extensio 20 each AROM  - sidelying R shoulder ER AROM 2 x 10  - seated chest press AAROM with cane 1 x 10   - B shoulder flex AAROM with towel @ wall 2 x 10   - standing R shoulder ER/IR with AAS with towel and YTB 1 x 10 each  - standing B shoulder extension with Manual Therapy                           Therapeutic Activity                           Modalities    - cold pack to R shoulder at end of session 10 minutes - cold pack to R shoulder at end of session 10 minutes - cold pack to R shoulder at end of sessio sleep without waking due to shoulder pain - NOT MET  · Pt will improve shoulder flexion AROM to >120 degrees to be able to reach into overhead cabinets without pain or restriction _ PARTIALLY MET  · Pt will improve shoulder abduction AROM to >90 degrees to

## 2019-03-28 ENCOUNTER — OFFICE VISIT (OUTPATIENT)
Dept: PHYSICAL THERAPY | Age: 59
End: 2019-03-28
Attending: ORTHOPAEDIC SURGERY
Payer: COMMERCIAL

## 2019-03-28 PROCEDURE — 97110 THERAPEUTIC EXERCISES: CPT

## 2019-03-28 NOTE — PROGRESS NOTES
Diagnosis: Complete tear of right rotator cuff (M75.121)  Biceps tendonitis on right (M75.21)  Status post right shoulder arthroscopy, subacromial decompression, rotator cuff repair, and biceps tenodesis.     Next MD visit: March 8, 2019  Surgery Date: 12/5 without weight x 10  - standing B shoulder abduction to 90 degrees to fatigue x 15  - standing B shoulder rhythmic stabilization CW/CCW with towel x 20 each  - sidelying R shoulder ER with towel under elbow 2# DB x 30   - supine B horizontal abd/adduction scap rows with 2# 3x10  - prone R shoulder ext with 2# 2x10  - seated OH pulleys into flexion and abduction 30x ea   standing B narrow high rows with GSC 3 x 10  - standing B shoulder extension with GSC 2x10  - standing R shoulder ER/IR with RTB 3x10 ea  - 25x ea  - sidelying R shoulder ER with 1# 2x10  - sidelying R shoulder abduction 2x10  - prone R scap rows with 2# 1x15  - standing B narrow high rows with GSC 3 x 10  - standing B shoulder extension with RSC 3x10  - standing R shoulder ER/IR with RTB 2x10 seated B shoulder flx with wand 2x10  - standing R shoulder ER/IR with YTB 2x10 ea  - standing B shoulder extension with [address] 2 x 10   - standing B narrow high row with GSC 2 x 10   - standing R shoulder wall wash with towel into flexion: not today - supine 1x15  - prone R shoulder extension 1x15 - standing R shoulder pendulum A/P, M/L, CW/CCW x 30 each  - supine B shoulder flexion AAROM with cane 2 x 10  - supine B chest press with cane 2 x 10   - supine R shoulder ER with cane 2 x 10   - supine R shoulder P shoulder pendulums AP, ML, CW/CCW 30x ea  - supine AAROM B shoulder flexion 2x10  - supine R shoulder ER with AAROM 2x10  - standing B shoulder extension with wand 2x10 - supine R PROM shoulder 25 minutes   - standing R shoulder pendulums AP, ML, CW/CCW 30 shoulder 10 minutes  - cold pack to R shoulder at end of session 10 minutes - heat pad at start of session   To R shoulder 10 minutes  - cold pack to R shoulder at end of session 10 minutes - heat pad at start of session   To R shoulder 10 minutes  - cold will demonstrate increased mid/low trap strength to 4/5 to promote improved shoulder mechanics and stabilization with ADL such as lifting and reaching- IN PROGRESS  · Pt will be independent and compliant with comprehensive HEP to maintain progress achieved

## 2019-04-01 ENCOUNTER — OFFICE VISIT (OUTPATIENT)
Dept: PHYSICAL THERAPY | Age: 59
End: 2019-04-01
Attending: ORTHOPAEDIC SURGERY
Payer: COMMERCIAL

## 2019-04-01 PROCEDURE — 97110 THERAPEUTIC EXERCISES: CPT

## 2019-04-01 NOTE — PROGRESS NOTES
Diagnosis: Complete tear of right rotator cuff (M75.121)  Biceps tendonitis on right (M75.21)  Status post right shoulder arthroscopy, subacromial decompression, rotator cuff repair, and biceps tenodesis.     Next MD visit: March 8, 2019  Surgery Date: 12/5 shoulder extension isometric hold 20 second holds x 3 reps  - incline bench shoulder flexion with wand 2x10 - standing B narrow high row isometric hold x 30 seconds then reps to fatigue x 50 reps  - standing B narrow mid row isometric holds x 30 seconds th 10, 2# DB 2 x 10    - supine R shoulder PROM x 18 minutes  - supine B shoulder flexion with wand 3x10  - supine R chest press 2# 3x10  - supine R shoulder flexion 2# 25x  - supine R shoulder rhythmic stabilization with 2# AP, ML,  CW/CCW 25x ea  - sidelyin supine R shoulder rhythmic stabilization with 1# AP, ML, CW/CCW 25x ea  - sidelying R shoulder ER with 1# 2x10  - sidelying R shoulder abduction 0# 2x10  - prone R scap rows with 2# 2x10  - prone R shoulder ext with 1# 2x10  - standing B shoulder flexion w supine R shoulder flexion rhythmic stabilization AP, ML 25x  - sidelying R shoulder ER AROM 3x10  - sidelying R shoulder abduction 3x10  - prone scap rows 1# 2x10  - prone shoulder extension 1# 2x10  - standing R shoulder ER/IR with YTB: not tested  - jose isometrics with AAS 10\" holds 1 x 10 each  - sidelying R shoulder ER AROM 2x10  - prone R scap rows 2x10  - prone R shoulder extension 2x10  - standing R shoulder ER/IR with AAS with towel and YTB 1 x 10 each  - standing B shoulder extension with [address] 2 x sidelying R shoulder ER AROM 2 x 10  - seated chest press AAROM with cane 1 x 10   - B shoulder flex AAROM with towel @ wall 2 x 10   - standing R shoulder ER/IR with AAS with towel and YTB 1 x 10 each  - standing B shoulder extension with [address] 2 x 10   - s Therapy                             Therapeutic Activity                             Modalities   -  - cold pack to R shoulder at end of session 10 minutes - cold pack to R shoulder at end of session 10 minutes - cold pack to R shoulder at end of session 1 able.   Goals:   · Pt will report improved ability to sleep without waking due to shoulder pain - NOT MET  · Pt will improve shoulder flexion AROM to >120 degrees to be able to reach into overhead cabinets without pain or restriction _ PARTIALLY MET  · Pt

## 2019-04-03 ENCOUNTER — APPOINTMENT (OUTPATIENT)
Dept: PHYSICAL THERAPY | Age: 59
End: 2019-04-03
Attending: ORTHOPAEDIC SURGERY
Payer: COMMERCIAL

## 2019-04-03 ENCOUNTER — OFFICE VISIT (OUTPATIENT)
Dept: PHYSICAL THERAPY | Age: 59
End: 2019-04-03
Attending: ORTHOPAEDIC SURGERY
Payer: COMMERCIAL

## 2019-04-03 PROCEDURE — 97110 THERAPEUTIC EXERCISES: CPT

## 2019-04-03 NOTE — PROGRESS NOTES
Diagnosis: Complete tear of right rotator cuff (M75.121)  Biceps tendonitis on right (M75.21)  Status post right shoulder arthroscopy, subacromial decompression, rotator cuff repair, and biceps tenodesis.     Next MD visit: April 19, 2019  Surgery Date: 12/ 10x  - standing B shoulder scaption: next session  - sidelying R shoulder ER with towel under elbow 1# DB x 30  - sidelying R shoulder abduction 0# 2x10   - supine B shoulder flexion with 2.5# 2 x10  - supine R shoulder flexion 1# 2x10  - prone R shoulder shoulder ER with towel under elbow 2# DB x 30   - supine B horizontal abd/adduction AROM x 20 reps   - supine B shoulder flexion OH AROM x 20 reps   - prone R shoulder extension isometric hold 20 second holds x 3   - prone R horizontal abduction 1 x 15  - B shoulder extension with GSC 2x10  - standing R shoulder ER/IR with RTB 3x10 ea  - standing R shoulder flexion on wall with towel 2x10  - incline bench OH B shoulder flexion with wand 2x10 - supine R shoulder PROM x 20 minutes  - supine B shoulder flexion standing B shoulder extension with RSC 3x10  - standing R shoulder ER/IR with RTB 2x10 ea   - NuStep L2 x 3 minutes (UEs only)  - supine R shoulder PROM 15 minutes  - supine B shoulder flexion with wand 3x10  - supine B chest press with wand 3x10  - supine x 10   - standing R shoulder wall wash with towel into flexion: not today - supine B shoulder flexion with wand 3x10  - supine B chest press with wand 3x10  - supine R shoulder flexion AROM 1x15  - supine R chest press 1# 2x10  - supine B shoulder ER AROM with cane 2 x 10   - supine R shoulder ER with cane 2 x 10   - supine R shoulder PROM x 12 minutes  - supine R shoulder ER/IR isometrics with AAS 5\" holds 2 x 10 each  - supine R shoulder rhythmic stabilization 90 degree flexion up/down 2 x 20 each AROM supine R PROM shoulder 25 minutes   - standing R shoulder pendulums AP, ML, CW/CCW 30x ea  - supine AAROM B shoulder flexion 2x5 reps  - supine R shoulder ER with AAROM 2x10   - supine R PROM shoulder  30 minutes into flx/abd/ER (per protocol)  - standing session 10 minutes - heat pad at start of session   To R shoulder 10 minutes  - cold pack to R shoulder at end of session 10 minutes - heat pad at start of session   To R shoulder 10 minutes  - cold pack to R shoulder at end of session 10 minutes - heat pa shoulder mechanics and stabilization with ADL such as lifting and reaching- IN PROGRESS  · Pt will be independent and compliant with comprehensive HEP to maintain progress achieved in PT - IN PROGRESS    Plan: continue PT    Charges: Ex 3     Total Timed T

## 2019-04-08 ENCOUNTER — OFFICE VISIT (OUTPATIENT)
Dept: PHYSICAL THERAPY | Age: 59
End: 2019-04-08
Attending: ORTHOPAEDIC SURGERY
Payer: COMMERCIAL

## 2019-04-08 ENCOUNTER — APPOINTMENT (OUTPATIENT)
Dept: PHYSICAL THERAPY | Age: 59
End: 2019-04-08
Attending: ORTHOPAEDIC SURGERY
Payer: COMMERCIAL

## 2019-04-08 PROCEDURE — 97110 THERAPEUTIC EXERCISES: CPT

## 2019-04-08 NOTE — PROGRESS NOTES
Diagnosis: Complete tear of right rotator cuff (M75.121)  Biceps tendonitis on right (M75.21)  Status post right shoulder arthroscopy, subacromial decompression, rotator cuff repair, and biceps tenodesis.     Next MD visit: April 19, 2019  Surgery Date: 12/ 25x  - sidelying R shoulder abduction 1# 2x10   - supine B shoulder flexion with 2.5# 25x  - supine R shoulder flexion 1# 3x10  - prone R shoulder ext 2# 25x  - prone R rows 2# 2x10  - prone R mid trap 0# 2x10  - incline bench shoulder flexion with wand 2x standing B narrow mid row isometric holds x 30 seconds then reps to fatigue x 50 reps  - standing B shoulder extension with RSC isometric hold x 30 seconds then reps to fatigue x 25 reps  - standing B shoulder extension with straight bar 20# 2 x 10    - st stabilization with 2# AP, ML,  CW/CCW 25x ea  - sidelying R shoulder ER with 1# 3x10  - prone R scap rows with 2# 3x10  - prone R shoulder ext with 2# 2x10   standing B narrow high rows with GSC 3 x 10  - standing B shoulder extension with GSC 2x10  - jose shoulder ext with 1# 2x10  - standing B shoulder flexion wall wash 2x10  - seated OH pulleys into flexion and abduction 30x ea   standing B narrow high rows with GSC 3 x 10  - standing B shoulder extension with GSC 2x10  - standing R shoulder ER/IR with RT 2x10  - standing R shoulder ER/IR with YTB: not tested  - standing B shoulder extension with [address] 2 x 10   - standing B narrow high row with GSC 2 x 10   - standing B shoulder ext with wand 2x10  - OH pulleys into flexion/abduction 25x ea - supine R shoulde x 10 each  - standing B shoulder extension with [address] 2 x 10   - standing B narrow high row with [address] 2 x 10   - standing R shoulder wall wash with towel into flexion 1x10   - supine R shoulder PROM x 12 minutes  - supine B shoulder flexion with wand AAROM 2x each  - standing B shoulder extension with [address] 2 x 10   - standing B narrow high row with [address] 1 x 10  - supine R PROM shoulder into flx, abd, ER x 20 minutes   - standing R shoulder pendulums AP, ML, CW/CCW 30x ea  - supine AAROM B shoulder flexion 2x10  - minutes - cold pack to R shoulder at end of session 10 minutes - cold pack to R shoulder at end of session 10 minutes - cold pack to R shoulder at end of session 10 minutes - cold pack to R shoulder at end of session 10 minutes - cold pack to R shoulder at she can perform without compensation.   Goals:   · Pt will report improved ability to sleep without waking due to shoulder pain - NOT MET  · Pt will improve shoulder flexion AROM to >120 degrees to be able to reach into overhead cabinets without pain or res

## 2019-04-10 ENCOUNTER — OFFICE VISIT (OUTPATIENT)
Dept: PHYSICAL THERAPY | Age: 59
End: 2019-04-10
Attending: ORTHOPAEDIC SURGERY
Payer: COMMERCIAL

## 2019-04-10 ENCOUNTER — APPOINTMENT (OUTPATIENT)
Dept: PHYSICAL THERAPY | Age: 59
End: 2019-04-10
Attending: ORTHOPAEDIC SURGERY
Payer: COMMERCIAL

## 2019-04-10 PROCEDURE — 97110 THERAPEUTIC EXERCISES: CPT

## 2019-04-10 NOTE — PROGRESS NOTES
Diagnosis: Complete tear of right rotator cuff (M75.121)  Biceps tendonitis on right (M75.21)  Status post right shoulder arthroscopy, subacromial decompression, rotator cuff repair, and biceps tenodesis.     Next MD visit: April 19, 2019  Surgery Date: 12/ cable pulley 25# 3x10  - standing B shoulder ER with [address] 2x10  - standing R bicep curl 2# 3x10  - standing R shoulder ER/IR with RTB 3x10 ea  - incline bench B shoulder flexion with wand 2x10  - incline bench B shoulder horizontal abd/add with 0# 1x10  - s 2x10  - prone R rows 1# 2x10  - incline bench shoulder flexion with wand 2x10 - standing B scap high rows with GSC 3x10  - standing B shoulder extension with RSC isometric hold x 30 seconds then reps to fatigue x 25 reps  - standing B shoulder extension wi standing B narrow high/mid row 10\" holds x 10 each with [address]  - standing B shoulder extension with [address] 10\" holds x 10   - standing B shoulder extension with straight bar 15# 3 x 10   - standing R shoulder IR with towel under elbow 3 x 10 with RTB  - stand flexion with wand 3x10  - supine R chest press 1# 3x10  - supine R shoulder flexion 1# 2x10  - supine R shoulder AROM flexion 2x10  - supine R shoulder rhythmic stabilization with 1# AP, ML, CW/CCW 25x ea  - sidelying R shoulder ER with 1# 3x10  - sidelyin supine R chest press with 1# 3x10  - supine R shoulder flexion AROM 2x12  - supine R shoulder flexion rhythmic stabilization AP, ML 25x  - sidelying R shoulder ER AROM 1# 25x  - sidelying R shoulder abduction 1x10  - prone scap rows 1# 2x10  - prone should AROM 3x10  - sidelying R shoulder ER AROM 3x10  - sidelying R shoulder abduction 2x10  - prone scap rows 3x10  - prone shoulder extension 3x10  - supine R shoulder ER/IR isometrics with AAS and 45 degrees 10\" holds 1 x 10 each  - seated B shoulder flx wit AROM  - sidelying R shoulder ER AROM 2 x 10  - standing R shoulder ER/IR with AAS with towel and YTB 1 x 10 each  - standing B shoulder extension with [address] 1 x 10   - standing B narrow high row with [address] 1 x 10   - prone R scap rows 1x10  - prone R shoulder standing R shoulder pendulums AP, ML, CW/CCW 30x ea  - supine AAROM B shoulder flexion 2x5 reps   - supine R PROM shoulder  30 minutes into flx/abd/ER (per protocol)  - standing R shoulder pendulums AP, ML, CW/CCW 30x ea   - supine R PROM shoulder motions heat pad at start of session   To R shoulder 10 minutes  - cold pack to R shoulder at end of session 10 minutes - heat pad at start of session   To R shoulder 10 minutes  - cold pack to R shoulder at end of session 10 minutes - heat pad at start of session continue PT    Charges: Ex 3     Total Timed Treatment: 40 min  Total Treatment Time: 50 min

## 2019-04-17 ENCOUNTER — OFFICE VISIT (OUTPATIENT)
Dept: PHYSICAL THERAPY | Age: 59
End: 2019-04-17
Attending: ORTHOPAEDIC SURGERY
Payer: COMMERCIAL

## 2019-04-17 ENCOUNTER — APPOINTMENT (OUTPATIENT)
Dept: PHYSICAL THERAPY | Age: 59
End: 2019-04-17
Attending: ORTHOPAEDIC SURGERY
Payer: COMMERCIAL

## 2019-04-17 PROCEDURE — 97110 THERAPEUTIC EXERCISES: CPT

## 2019-04-17 NOTE — PROGRESS NOTES
Physical Therapy Progress Summary    Diagnosis: Complete tear of right rotator cuff (M75.121)  Biceps tendonitis on right (M75.21)  Status post right shoulder arthroscopy, subacromial decompression, rotator cuff repair, and biceps tenodesis.     Next MD vis 1/11/19  Visit #: 7/18 Date: 1/9/19  Visit #: 6/18 Date: 1/7/19  Visit #: 5/18   HEP                  - updated HEP see pt instructions section       - discuss can perform AAROM exercises at home - pt verbalized understanding   -  -  -    Therapeutic Exerc 2x10  - standing R shoulder rhythmic stabilization with towel on wall CW/CCW 20x ea  - standing R shoulder IR/HBB with wand 2x10  - standing B shoulder flexion with wand to 90 deg (cues to prevent shoulder hiking) 2x10  - sidelying R shoulder ER with towel with wand OH 30 reps  - supine B shoulder flexion AROM 20 reps  - prone R shoulder extension isometric hold 20 second holds x 3 reps  - incline bench shoulder flexion with wand 2x10 - standing B narrow high row isometric hold x 30 seconds then reps to fati 3 x 10 AROM  - sidelying R shoulder ER with towel under elbow 1# DB 1 x 10, 2# DB 2 x 10    - supine R shoulder PROM x 18 minutes  - supine B shoulder flexion with wand 3x10  - supine R chest press 2# 3x10  - supine R shoulder flexion 2# 25x  - supine R sh shoulder flexion with 1# 2x10  - supine R shoulder flexion AROM 1x10  - supine R shoulder rhythmic stabilization with 1# AP, ML, CW/CCW 25x ea  - sidelying R shoulder ER with 1# 2x10  - sidelying R shoulder abduction 0# 2x10  - prone R scap rows with 2# 2x R chest press with 1# 3x10  - supine R shoulder flexion AROM 2x10  - supine R shoulder flexion rhythmic stabilization AP, ML 25x  - sidelying R shoulder ER AROM 3x10  - sidelying R shoulder abduction 3x10  - prone scap rows 1# 2x10  - prone shoulder extens supine R shoulder flexion AROM 2x10  - supine R shoulder ER/IR isometrics with AAS 10\" holds 1 x 10 each  - sidelying R shoulder ER AROM 2x10  - prone R scap rows 2x10  - prone R shoulder extension 2x10  - standing R shoulder ER/IR with AAS with towel and rhythmic stabilization 90 degree flexion up/down 2 x 20 each AROM  - sidelying R shoulder ER AROM 2 x 10  - seated chest press AAROM with cane 1 x 10   - B shoulder flex AAROM with towel @ wall 2 x 10   - standing R shoulder ER/IR with AAS with towel and Y standing R shoulder pendulums AP, ML, CW/CCW 25x ea   Manual Therapy                                 Therapeutic Activity                                 Modalities   - - cold pack to R shoulder at end of session 10 minutes - cold pack to R shoulder at end shoulder 10 minutes  - cold pack at end of session to R shoulder 10 minutes - heat pad at start of session to R shoulder 10 minutes                                      Goals:   · Pt will report improved ability to sleep without waking due to shoulder pain

## 2019-04-19 ENCOUNTER — OFFICE VISIT (OUTPATIENT)
Dept: ORTHOPEDICS CLINIC | Facility: CLINIC | Age: 59
End: 2019-04-19
Payer: COMMERCIAL

## 2019-04-19 DIAGNOSIS — S46.011A TRAUMATIC COMPLETE TEAR OF RIGHT ROTATOR CUFF, INITIAL ENCOUNTER: Primary | ICD-10-CM

## 2019-04-19 PROCEDURE — 99213 OFFICE O/P EST LOW 20 MIN: CPT | Performed by: ORTHOPAEDIC SURGERY

## 2019-04-19 PROCEDURE — 99212 OFFICE O/P EST SF 10 MIN: CPT | Performed by: ORTHOPAEDIC SURGERY

## 2019-04-19 NOTE — PROGRESS NOTES
This is a pleasant 45-year-old female that is 4.5 months status post right shoulder arthroscopy, subacromial decompression, rotator cuff repair and biceps tenodesis. She has had no chest pain or shortness of breath.   She has been working in physical thera

## 2019-04-22 ENCOUNTER — OFFICE VISIT (OUTPATIENT)
Dept: PHYSICAL THERAPY | Age: 59
End: 2019-04-22
Attending: ORTHOPAEDIC SURGERY
Payer: COMMERCIAL

## 2019-04-22 ENCOUNTER — APPOINTMENT (OUTPATIENT)
Dept: PHYSICAL THERAPY | Age: 59
End: 2019-04-22
Attending: ORTHOPAEDIC SURGERY
Payer: COMMERCIAL

## 2019-04-22 PROCEDURE — 97110 THERAPEUTIC EXERCISES: CPT

## 2019-04-22 NOTE — PROGRESS NOTES
Physical Therapy Discharge Summary    Diagnosis: Complete tear of right rotator cuff (M75.121)  Biceps tendonitis on right (M75.21)  Status post right shoulder arthroscopy, subacromial decompression, rotator cuff repair, and biceps tenodesis.     Next MD vi 8/18 Date: 1/11/19  Visit #: 7/18 Date: 1/9/19  Visit #: 6/18 Date: 1/7/19  Visit #: 5/18   HEP                   - updated HEP see pt instructions section       - discuss can perform AAROM exercises at home - pt verbalized understanding   -  -  -    Thera shoulder horizontal abd/add with 0# 1x10  - standing B shoulder flexion to 90 deg 1x10 (cues to prevent shoulder hiking) - standing B scap high and mid rows with GSC 3x10  - standing B shoulder ext with GSC 2x10  - standing B shoulder extension with straig reps  - standing B shoulder extension with straight bar 20# 2 x 10    - standing R shoulder IR with towel under elbow with GTB 2 x 10   - standing R shoulder ER with towel under elbow with RTB 2x10  - standing B shoulder flexion with wall wash 30 reps  - s elbow 3 x 10 with RTB  - standing R shoulder ER with towel under elbow with RTB 5\" holds 2 x 10 and 1 x 10 regular  - standing B shoulder ER with [address] 3 x 10   - standing R shoulder 90/90 with YTB step back 2 x 10   - standing R shoulder rhythmic stabiliza ER with 1# 3x10  - sidelying R shoulder abduction 1# 2x10  - prone R scap rows with 2# 3x10  - prone R shoulder ext with 1# 3x10  - seated OH pulleys into flexion and abduction 30x ea   standing B narrow high rows with GSC 3 x 10  - standing B shoulder ext 1# 2x10  - prone shoulder extension 1# 2x10  - standing R shoulder ER/IR with YTB 2x12 ea  - standing B shoulder extension with [address] 2 x 10   - standing B narrow high row with GSC 2 x 10   - standing B shoulder ext with wand 25x  - OH pulleys into flexion/a seated B shoulder flx with wand 1x10  - standing R shoulder ER/IR with YTB 1x15 ea  - standing B shoulder extension with [address] 2 x 10   - standing B narrow high row with [address] 2 x 10   - standing R shoulder wall wash with towel into flexion 1x15 - supine R jesus 1x10  - prone R shoulder extension 1x10 - standing R shoulder pendulum A/P, M/L, CW/CCW x 30 each  - supine B shoulder flexion AAROM with cane 2 x 10  - supine B chest press with cane 2 x 10   - supine R shoulder ER with cane 2 x 10   - supine R shoulder P shoulder motions x 30 minutes into flx/abd/ER (per protocol)  - standing R shoulder pendulums AP, ML, CW/CCW 30x ea  - supine R PROM R elbow flx/ext 10x  - supine R wrist flx/ext 30x  - supine R / ball squeeze 30x   - supine R PROM shoulder motions x 3 pad at start of session   To R shoulder 10 minutes  - cold pack to R shoulder at end of session 10 minutes - heat pad at start of session   To R shoulder 10 minutes  - cold pack to R shoulder at end of session 10 minutes - heat pad at start of session   To

## 2019-04-25 ENCOUNTER — APPOINTMENT (OUTPATIENT)
Dept: LAB | Age: 59
End: 2019-04-25
Attending: INTERNAL MEDICINE
Payer: COMMERCIAL

## 2019-04-25 ENCOUNTER — TELEPHONE (OUTPATIENT)
Dept: INTERNAL MEDICINE CLINIC | Facility: CLINIC | Age: 59
End: 2019-04-25

## 2019-04-25 ENCOUNTER — OFFICE VISIT (OUTPATIENT)
Dept: INTERNAL MEDICINE CLINIC | Facility: CLINIC | Age: 59
End: 2019-04-25
Payer: COMMERCIAL

## 2019-04-25 VITALS
BODY MASS INDEX: 38.41 KG/M2 | WEIGHT: 225 LBS | SYSTOLIC BLOOD PRESSURE: 126 MMHG | RESPIRATION RATE: 18 BRPM | DIASTOLIC BLOOD PRESSURE: 76 MMHG | TEMPERATURE: 98 F | HEART RATE: 76 BPM | HEIGHT: 64 IN

## 2019-04-25 DIAGNOSIS — Z13.6 SCREENING FOR HEART DISEASE: ICD-10-CM

## 2019-04-25 DIAGNOSIS — E11.9 CONTROLLED TYPE 2 DIABETES MELLITUS WITHOUT COMPLICATION, WITHOUT LONG-TERM CURRENT USE OF INSULIN (HCC): Primary | ICD-10-CM

## 2019-04-25 DIAGNOSIS — E78.00 HYPERCHOLESTEREMIA: ICD-10-CM

## 2019-04-25 DIAGNOSIS — E11.9 CONTROLLED TYPE 2 DIABETES MELLITUS WITHOUT COMPLICATION, WITHOUT LONG-TERM CURRENT USE OF INSULIN (HCC): ICD-10-CM

## 2019-04-25 DIAGNOSIS — Z12.39 BREAST CANCER SCREENING: ICD-10-CM

## 2019-04-25 DIAGNOSIS — I10 ESSENTIAL HYPERTENSION: ICD-10-CM

## 2019-04-25 PROBLEM — S49.91XA RIGHT SHOULDER INJURY, INITIAL ENCOUNTER: Status: RESOLVED | Noted: 2018-10-20 | Resolved: 2019-04-25

## 2019-04-25 PROBLEM — R73.03 PREDIABETES: Status: RESOLVED | Noted: 2017-06-16 | Resolved: 2019-04-25
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
## 2019-04-25 PROCEDURE — 80053 COMPREHEN METABOLIC PANEL: CPT

## 2019-04-25 PROCEDURE — 99212 OFFICE O/P EST SF 10 MIN: CPT | Performed by: INTERNAL MEDICINE

## 2019-04-25 PROCEDURE — 99214 OFFICE O/P EST MOD 30 MIN: CPT | Performed by: INTERNAL MEDICINE

## 2019-04-25 PROCEDURE — 80061 LIPID PANEL: CPT

## 2019-04-25 PROCEDURE — 82570 ASSAY OF URINE CREATININE: CPT

## 2019-04-25 PROCEDURE — 36415 COLL VENOUS BLD VENIPUNCTURE: CPT

## 2019-04-25 PROCEDURE — 83036 HEMOGLOBIN GLYCOSYLATED A1C: CPT

## 2019-04-25 PROCEDURE — 82043 UR ALBUMIN QUANTITATIVE: CPT

## 2019-04-25 RX ORDER — ROSUVASTATIN CALCIUM 10 MG/1
TABLET, COATED ORAL
Qty: 90 TABLET | Refills: 1 | Status: SHIPPED | OUTPATIENT
Start: 2019-04-25 | End: 2021-07-14

## 2019-04-25 RX ORDER — METFORMIN HYDROCHLORIDE 500 MG/1
TABLET, EXTENDED RELEASE ORAL
Qty: 180 TABLET | Refills: 1 | Status: SHIPPED | OUTPATIENT
Start: 2019-04-25 | End: 2019-10-26

## 2019-04-25 RX ORDER — LOSARTAN POTASSIUM AND HYDROCHLOROTHIAZIDE 12.5; 5 MG/1; MG/1
1 TABLET ORAL DAILY
Qty: 90 TABLET | Refills: 0 | Status: SHIPPED | OUTPATIENT
Start: 2019-04-25 | End: 2019-10-02

## 2019-04-25 RX ORDER — VALSARTAN AND HYDROCHLOROTHIAZIDE 80; 12.5 MG/1; MG/1
1 TABLET, FILM COATED ORAL DAILY
Qty: 90 TABLET | Refills: 1 | Status: SHIPPED | OUTPATIENT
Start: 2019-04-25 | End: 2019-04-25 | Stop reason: ALTCHOICE

## 2019-04-25 NOTE — TELEPHONE ENCOUNTER
New medication sent to the pharmacy as ordered by Dr. Quigley Promise  Transfer to triage  To inform the patient.

## 2019-04-25 NOTE — PROGRESS NOTES
HPI:    Patient ID: Corrina Moore is a 63year old female. Diabetes   She presents for her follow-up diabetic visit. She has type 2 diabetes mellitus. Her disease course has been stable. There are no hypoglycemic associated symptoms.  There are no diabe retinopathy. There is no history of chronic renal disease or a hypertension causing med. Hyperlipidemia   This is a chronic problem. The current episode started more than 1 year ago. The problem is controlled.  Recent lipid tests were reviewed and are nor Mouth/Throat: Oropharynx is clear and moist. No oropharyngeal exudate. Eyes: Pupils are equal, round, and reactive to light. Conjunctivae and EOM are normal. Right eye exhibits no discharge. Left eye exhibits no discharge.    Neck: Normal range of motio SCREENING BILAT         (CPT=77067/69800)        Order for mammogram.     (Z13.6) Screening for heart disease  Plan: CT CALCIUM SCORING        Pt advised to do heart scan given risk factors for CAD. She agreed to do it.          Orders Placed This Encounter

## 2019-04-25 NOTE — TELEPHONE ENCOUNTER
[id number] calling to advise meds below is on backorder and states can use alternative Losartan-Hydro and Telmisartan-Hydro    •  Valsartan-hydroCHLOROthiazide 80-12.5 MG Oral Tab, Take 1 tablet by mouth daily. , Disp: 90 tablet, Rfl: 1

## 2019-05-14 ENCOUNTER — HOSPITAL ENCOUNTER (OUTPATIENT)
Dept: MAMMOGRAPHY | Age: 59
Discharge: HOME | End: 2019-05-14
Attending: INTERNAL MEDICINE
Payer: COMMERCIAL

## 2019-05-14 DIAGNOSIS — Z12.39 BREAST CANCER SCREENING: ICD-10-CM

## 2019-05-14 PROCEDURE — 77067 SCR MAMMO BI INCL CAD: CPT | Performed by: INTERNAL MEDICINE

## 2019-05-14 PROCEDURE — 77063 BREAST TOMOSYNTHESIS BI: CPT | Performed by: INTERNAL MEDICINE

## 2019-07-05 ENCOUNTER — OFFICE VISIT (OUTPATIENT)
Dept: ORTHOPEDICS CLINIC | Facility: CLINIC | Age: 59
End: 2019-07-05
Payer: COMMERCIAL

## 2019-07-05 DIAGNOSIS — S46.011A TRAUMATIC COMPLETE TEAR OF RIGHT ROTATOR CUFF, INITIAL ENCOUNTER: Primary | ICD-10-CM

## 2019-07-05 DIAGNOSIS — M75.21 BICEPS TENDONITIS ON RIGHT: ICD-10-CM

## 2019-07-05 PROCEDURE — 99213 OFFICE O/P EST LOW 20 MIN: CPT | Performed by: ORTHOPAEDIC SURGERY

## 2019-07-05 NOTE — PROGRESS NOTES
This is a pleasant 13-year-old female that is nearly 7 months status post right shoulder arthroscopy, rotator cuff repair, and biceps tenodesis. She has had no chest pain or shortness of breath. The patient has been working out at a [employer].   She repor

## 2019-10-03 RX ORDER — LOSARTAN POTASSIUM AND HYDROCHLOROTHIAZIDE 12.5; 5 MG/1; MG/1
TABLET ORAL
Qty: 90 TABLET | Refills: 1 | Status: SHIPPED | OUTPATIENT
Start: 2019-10-03 | End: 2019-10-05 | Stop reason: RX

## 2019-10-05 ENCOUNTER — TELEPHONE (OUTPATIENT)
Dept: OTHER | Age: 59
End: 2019-10-05

## 2019-10-05 RX ORDER — LOSARTAN POTASSIUM 50 MG/1
50 TABLET ORAL DAILY
Qty: 90 TABLET | Refills: 0 | Status: SHIPPED | OUTPATIENT
Start: 2019-10-05 | End: 2020-01-29

## 2019-10-05 RX ORDER — HYDROCHLOROTHIAZIDE 12.5 MG/1
12.5 CAPSULE, GELATIN COATED ORAL DAILY
Qty: 90 CAPSULE | Refills: 0 | Status: SHIPPED | OUTPATIENT
Start: 2019-10-05 | End: 2020-01-29

## 2019-10-05 NOTE — TELEPHONE ENCOUNTER
LOSARTAN POTASSIUM-HCTZ 50-12.5 MG Oral Tab is on back order. The pharmacy is asking if it can be  or changed. I pended the medications separately.

## 2019-10-11 ENCOUNTER — OFFICE VISIT (OUTPATIENT)
Dept: OPTOMETRY | Facility: CLINIC | Age: 59
End: 2019-10-11
Payer: COMMERCIAL

## 2019-10-11 DIAGNOSIS — E11.9 TYPE 2 DIABETES MELLITUS WITHOUT COMPLICATION, WITHOUT LONG-TERM CURRENT USE OF INSULIN (HCC): Primary | ICD-10-CM

## 2019-10-11 DIAGNOSIS — H40.003 GLAUCOMA SUSPECT, BILATERAL: ICD-10-CM

## 2019-10-11 DIAGNOSIS — H25.13 AGE-RELATED NUCLEAR CATARACT OF BOTH EYES: ICD-10-CM

## 2019-10-11 PROCEDURE — 92014 COMPRE OPH EXAM EST PT 1/>: CPT | Performed by: OPTOMETRIST

## 2019-10-11 PROCEDURE — 92133 CPTRZD OPH DX IMG PST SGM ON: CPT | Performed by: OPTOMETRIST

## 2019-10-11 NOTE — PATIENT INSTRUCTIONS
Glaucoma suspect, bilateral  Had OCT done and no thinning. Will continue to monitor. Age-related nuclear cataract of both eyes  No treatment is required. Will continue to observe.     Type 2 diabetes mellitus without complication, without long-term curre

## 2019-10-11 NOTE — PROGRESS NOTES
Yolis Jaquez is a 63year old female. HPI:     HPI     Diabetic Eye Exam     Diabetes characteristics include controlled with diet, Type 2 and taking oral medications. Duration of 2 years. Number of years diabetic 2. Number of years on pills 2.   Nu Medications:  losartan Potassium 50 MG Oral Tab Take 1 tablet (50 mg total) by mouth daily. Disp: 90 tablet Rfl: 0   hydrochlorothiazide 12.5 MG Oral Cap Take 1 capsule (12.5 mg total) by mouth daily.  Disp: 90 capsule Rfl: 0   metFORMIN HCl  MG Oral quiet Deep and quiet    Iris Normal Normal    Lens 1+ Nuclear sclerosis 1+ Nuclear sclerosis    Vitreous Clear Clear          Fundus Exam       Right Left    Disc Normal Normal    C/D Ratio 0.3 0.3    Macula Normal no BDR Normal no BDR    Vessels Normal No

## 2019-10-17 ENCOUNTER — OFFICE VISIT (OUTPATIENT)
Dept: INTERNAL MEDICINE CLINIC | Facility: CLINIC | Age: 59
End: 2019-10-17
Payer: COMMERCIAL

## 2019-10-17 ENCOUNTER — APPOINTMENT (OUTPATIENT)
Dept: LAB | Age: 59
End: 2019-10-17
Attending: INTERNAL MEDICINE
Payer: COMMERCIAL

## 2019-10-17 VITALS
WEIGHT: 222 LBS | SYSTOLIC BLOOD PRESSURE: 125 MMHG | HEIGHT: 66 IN | DIASTOLIC BLOOD PRESSURE: 78 MMHG | HEART RATE: 73 BPM | TEMPERATURE: 98 F | BODY MASS INDEX: 35.68 KG/M2

## 2019-10-17 DIAGNOSIS — E11.9 CONTROLLED TYPE 2 DIABETES MELLITUS WITHOUT COMPLICATION, WITHOUT LONG-TERM CURRENT USE OF INSULIN (HCC): ICD-10-CM

## 2019-10-17 DIAGNOSIS — Z01.419 WELL FEMALE EXAM WITH ROUTINE GYNECOLOGICAL EXAM: ICD-10-CM

## 2019-10-17 DIAGNOSIS — E78.00 HYPERCHOLESTEREMIA: ICD-10-CM

## 2019-10-17 DIAGNOSIS — E11.9 CONTROLLED TYPE 2 DIABETES MELLITUS WITHOUT COMPLICATION, WITHOUT LONG-TERM CURRENT USE OF INSULIN (HCC): Primary | ICD-10-CM

## 2019-10-17 DIAGNOSIS — I10 ESSENTIAL HYPERTENSION: ICD-10-CM

## 2019-10-17 PROCEDURE — 80061 LIPID PANEL: CPT

## 2019-10-17 PROCEDURE — 83036 HEMOGLOBIN GLYCOSYLATED A1C: CPT

## 2019-10-17 PROCEDURE — 82570 ASSAY OF URINE CREATININE: CPT

## 2019-10-17 PROCEDURE — 90686 IIV4 VACC NO PRSV 0.5 ML IM: CPT | Performed by: INTERNAL MEDICINE

## 2019-10-17 PROCEDURE — 90471 IMMUNIZATION ADMIN: CPT | Performed by: INTERNAL MEDICINE

## 2019-10-17 PROCEDURE — 99214 OFFICE O/P EST MOD 30 MIN: CPT | Performed by: INTERNAL MEDICINE

## 2019-10-17 PROCEDURE — 82043 UR ALBUMIN QUANTITATIVE: CPT

## 2019-10-17 PROCEDURE — 36415 COLL VENOUS BLD VENIPUNCTURE: CPT

## 2019-10-17 PROCEDURE — 80053 COMPREHEN METABOLIC PANEL: CPT

## 2019-10-17 NOTE — PROGRESS NOTES
HPI:    Patient ID: Frances William is a 63year old female. Diabetes   She presents for her follow-up diabetic visit. She has type 2 diabetes mellitus. Her disease course has been fluctuating. There are no hypoglycemic associated symptoms.  There are no disease, hypothyroidism or nephrotic syndrome. Factors aggravating her hyperlipidemia include smoking and thiazides. Pertinent negatives include no chest pain or shortness of breath.  Current antihyperlipidemic treatment includes exercise, diet change and s moist. No oropharyngeal exudate. Eyes: Pupils are equal, round, and reactive to light. Conjunctivae and EOM are normal. Right eye exhibits no discharge. Left eye exhibits no discharge. Neck: Normal range of motion. Neck supple. No JVD present.    Cardio Hemoglobin A1C [E]      Lipid Panel [E]      Microalb/Creat Ratio, Random Urine      Flulaval 6 months and older 0.5 ml Quad PF [29271]      Meds This Visit:  Requested Prescriptions      No prescriptions requested or ordered in this encounter       [place]

## 2019-10-29 RX ORDER — METFORMIN HYDROCHLORIDE 500 MG/1
TABLET, EXTENDED RELEASE ORAL
Qty: 180 TABLET | Refills: 1 | Status: SHIPPED | OUTPATIENT
Start: 2019-10-29 | End: 2020-07-31

## 2019-10-29 NOTE — TELEPHONE ENCOUNTER
Refill passed per [facility] protocol.   Diabetes Medications  Protocol Criteria:  · Appointment scheduled in the past 6 months or the next 3 months  · A1C < 7.5 in the past 6 months  · Creatinine in the past 12 months  · Creatinine result < 1.5   Rece

## 2019-12-02 ENCOUNTER — OFFICE VISIT (OUTPATIENT)
Dept: OBGYN CLINIC | Facility: CLINIC | Age: 59
End: 2019-12-02
Payer: COMMERCIAL

## 2019-12-02 VITALS
WEIGHT: 221.38 LBS | HEIGHT: 64 IN | DIASTOLIC BLOOD PRESSURE: 81 MMHG | SYSTOLIC BLOOD PRESSURE: 143 MMHG | BODY MASS INDEX: 37.8 KG/M2 | HEART RATE: 76 BPM

## 2019-12-02 DIAGNOSIS — Z12.31 ENCOUNTER FOR SCREENING MAMMOGRAM FOR MALIGNANT NEOPLASM OF BREAST: ICD-10-CM

## 2019-12-02 DIAGNOSIS — Z01.419 WELL WOMAN EXAM WITH ROUTINE GYNECOLOGICAL EXAM: Primary | ICD-10-CM

## 2019-12-02 PROCEDURE — 99396 PREV VISIT EST AGE 40-64: CPT | Performed by: CLINICAL NURSE SPECIALIST

## 2019-12-02 NOTE — PROGRESS NOTES
Cindy Lipscomb is a 63year old female [id number] No LMP recorded. (Menstrual status: Menopause). Patient presents with:  Gyn Exam: NP ;Annual  Medication Request: No refills needed   Last seen in 2017 for annual and pap.  At that time she had had some irregu insecurity:        Worry: Not on file        Inability: Not on file      Transportation needs:        Medical: Not on file        Non-medical: Not on file    Tobacco Use      Smoking status: Current Every Day Smoker        Packs/day: 0.25        Types: Cig losartan Potassium 50 MG Oral Tab, Take 1 tablet (50 mg total) by mouth daily. , Disp: 90 tablet, Rfl: 0  •  hydrochlorothiazide 12.5 MG Oral Cap, Take 1 capsule (12.5 mg total) by mouth daily. , Disp: 90 capsule, Rfl: 0  •  Rosuvastatin Calcium 10 MG Oral T place, person and situation.  Appropriate mood and affect    Pelvic Exam:  External Genitalia: normal appearance, hair distribution, and no lesions  Urethral Meatus:  normal in size, location, without lesions and prolapse  Bladder:  No fullness, masses or t

## 2020-01-29 ENCOUNTER — NURSE TRIAGE (OUTPATIENT)
Dept: INTERNAL MEDICINE CLINIC | Facility: CLINIC | Age: 60
End: 2020-01-29

## 2020-01-29 RX ORDER — LOSARTAN POTASSIUM 50 MG/1
TABLET ORAL
Qty: 90 TABLET | Refills: 1 | Status: SHIPPED | OUTPATIENT
Start: 2020-01-29 | End: 2020-07-30

## 2020-01-29 RX ORDER — HYDROCHLOROTHIAZIDE 12.5 MG/1
CAPSULE, GELATIN COATED ORAL
Qty: 90 CAPSULE | Refills: 1 | Status: SHIPPED | OUTPATIENT
Start: 2020-01-29 | End: 2020-07-31

## 2020-01-29 NOTE — TELEPHONE ENCOUNTER
Refill passed per [facility], [facility] protocol.   Hypertensive Medications  Protocol Criteria:  · Appointment scheduled in the past 6 months or in the next 3 months  · BMP or CMP in the past 12 months  · Creatinine result < 2  Recent Outpatient Visits

## 2020-01-29 NOTE — TELEPHONE ENCOUNTER
Action Requested: Summary for Provider     []  Critical Lab, Recommendations Needed  [] Need Additional Advice  []   FYI    []   Need Orders  [] Need Medications Sent to Pharmacy  []  Other     SUMMARY: Advised ER now for epigastric, heartburn pain.  PT jonathan

## 2020-01-29 NOTE — TELEPHONE ENCOUNTER
Refill passed per [facility], [facility] protocol.   Hypertensive Medications  Protocol Criteria:  · Appointment scheduled in the past 6 months or in the next 3 months  · BMP or CMP in the past 12 months  · Creatinine result < 2  Recent Outpatient Visits

## 2020-01-31 NOTE — TELEPHONE ENCOUNTER
Spoke to patient. Please note Dr. Daryle Draft.     Patient did not go to ER as advised on 1/29/20. She said symptoms subsided. However still feels discomfort in epigastric area. Has acid reflux. Expressed importance of going to ER if abd pain is severe.

## 2020-02-03 ENCOUNTER — APPOINTMENT (OUTPATIENT)
Dept: LAB | Age: 60
End: 2020-02-03
Attending: INTERNAL MEDICINE
Payer: COMMERCIAL

## 2020-02-03 ENCOUNTER — OFFICE VISIT (OUTPATIENT)
Dept: INTERNAL MEDICINE CLINIC | Facility: CLINIC | Age: 60
End: 2020-02-03
Payer: COMMERCIAL

## 2020-02-03 VITALS
HEART RATE: 97 BPM | RESPIRATION RATE: 12 BRPM | BODY MASS INDEX: 38.45 KG/M2 | SYSTOLIC BLOOD PRESSURE: 127 MMHG | WEIGHT: 217 LBS | DIASTOLIC BLOOD PRESSURE: 77 MMHG | HEIGHT: 63 IN | TEMPERATURE: 98 F

## 2020-02-03 DIAGNOSIS — R10.13 EPIGASTRIC PAIN: Primary | ICD-10-CM

## 2020-02-03 PROCEDURE — 99214 OFFICE O/P EST MOD 30 MIN: CPT | Performed by: INTERNAL MEDICINE

## 2020-02-03 PROCEDURE — 93000 ELECTROCARDIOGRAM COMPLETE: CPT | Performed by: INTERNAL MEDICINE

## 2020-02-03 PROCEDURE — 83013 H PYLORI (C-13) BREATH: CPT | Performed by: INTERNAL MEDICINE

## 2020-02-03 RX ORDER — OMEPRAZOLE 40 MG/1
40 CAPSULE, DELAYED RELEASE ORAL DAILY
Qty: 30 CAPSULE | Refills: 2 | Status: SHIPPED | OUTPATIENT
Start: 2020-02-03 | End: 2020-03-11

## 2020-02-03 NOTE — PROGRESS NOTES
HPI:    Patient ID: Beatriz Montes is a 63year old female. Abdominal Pain   This is a new problem. The current episode started 1 to 4 weeks ago (2 weeks). The onset quality is sudden. The problem occurs intermittently.  The problem has been waxing and w MG Oral Tab TAKE 1 TABLET BY MOUTH  NIGHTLY 90 tablet 1   • aspirin 81 MG Oral Tab Take 81 mg by mouth daily.  Instructed to stop ASA on 11/29/18     • Glucose Blood In Vitro Strip To test blood glucose bid - For One Touch Ultra Mini 100 strip 2     Allergi (R10.13) Epigastric pain  (primary encounter diagnosis)  Plan: ELECTROCARDIOGRAM, COMPLETE, HELICOBACTER         PYLORI BREATH TEST, ADULT (>17), CANCELED:         ELECTROCARDIOGRAM, COMPLETE, CANCELED: EKG         12-LEAD        Pt has both epigastric p

## 2020-02-04 ENCOUNTER — OFFICE VISIT (OUTPATIENT)
Dept: PHYSICAL THERAPY | Age: 60
End: 2020-02-04
Attending: ORTHOPAEDIC SURGERY
Payer: COMMERCIAL

## 2020-02-04 DIAGNOSIS — M17.11 PRIMARY OSTEOARTHRITIS OF RIGHT KNEE: ICD-10-CM

## 2020-02-04 PROCEDURE — 97161 PT EVAL LOW COMPLEX 20 MIN: CPT

## 2020-02-04 PROCEDURE — 97110 THERAPEUTIC EXERCISES: CPT

## 2020-02-04 NOTE — PROGRESS NOTES
P.T. EVALUATION:   Referring Physician: Dr. Gela Mitchell  Diagnosis: Primary osteoarthritis of right knee (M17.11)   Date of Onset: Jan 2019 Date of Service: 2/4/2020     PATIENT SUMMARY   Marylene Bass is a 63year old y/o female who presents to therapy toda 4+/5  Knee ext: B 4+/5  Knee flx: R 4-/5, L 4/5  Ankle df: R 5/5, L 4+/5  Ankle pf: B 4+/5    Gait: pt ambulates independently with notable limp    Stairs: not assessed this session    Today’s Treatment and Response:  Patient education provided on PT eval

## 2020-02-05 LAB — H. PYLORI BREATH TEST: POSITIVE

## 2020-02-06 ENCOUNTER — TELEPHONE (OUTPATIENT)
Dept: INTERNAL MEDICINE CLINIC | Facility: CLINIC | Age: 60
End: 2020-02-06

## 2020-02-06 ENCOUNTER — OFFICE VISIT (OUTPATIENT)
Dept: PHYSICAL THERAPY | Age: 60
End: 2020-02-06
Attending: ORTHOPAEDIC SURGERY
Payer: COMMERCIAL

## 2020-02-06 DIAGNOSIS — M17.11 PRIMARY OSTEOARTHRITIS OF RIGHT KNEE: ICD-10-CM

## 2020-02-06 PROCEDURE — 97110 THERAPEUTIC EXERCISES: CPT

## 2020-02-06 NOTE — PROGRESS NOTES
Diagnosis: Primary osteoarthritis of right knee (M17.11)   Date of Onset: Jan 2019        Next MD visit: none scheduled  Fall Risk: standard         Precautions: n/a          Medication Changes since last visit?: No    Subjective: Patient reports 6/10 R katherine

## 2020-02-11 ENCOUNTER — OFFICE VISIT (OUTPATIENT)
Dept: PHYSICAL THERAPY | Age: 60
End: 2020-02-11
Attending: ORTHOPAEDIC SURGERY
Payer: COMMERCIAL

## 2020-02-11 DIAGNOSIS — M17.11 PRIMARY OSTEOARTHRITIS OF RIGHT KNEE: ICD-10-CM

## 2020-02-11 PROCEDURE — 97110 THERAPEUTIC EXERCISES: CPT

## 2020-02-11 NOTE — PROGRESS NOTES
Diagnosis: Primary osteoarthritis of right knee (M17.11)   Date of Onset: Jan 2019        Next MD visit: none scheduled  Fall Risk: standard         Precautions: n/a          Medication Changes since last visit?: No    Subjective: Patient reports 4/10 R kn strength of weak musculature to address deficits. Patient demo'd improved R knee ROM this session. PT and patient goals are in progress.      Goals:    1- Pt will be I with maintenance and progression of HEP  2- Pt will demo increase in R knee ROM to equal

## 2020-02-13 ENCOUNTER — APPOINTMENT (OUTPATIENT)
Dept: PHYSICAL THERAPY | Age: 60
End: 2020-02-13
Attending: ORTHOPAEDIC SURGERY
Payer: COMMERCIAL

## 2020-02-21 ENCOUNTER — OFFICE VISIT (OUTPATIENT)
Dept: PHYSICAL THERAPY | Age: 60
End: 2020-02-21
Attending: ORTHOPAEDIC SURGERY
Payer: COMMERCIAL

## 2020-02-21 DIAGNOSIS — M17.11 PRIMARY OSTEOARTHRITIS OF RIGHT KNEE: ICD-10-CM

## 2020-02-21 PROCEDURE — 97110 THERAPEUTIC EXERCISES: CPT

## 2020-02-21 NOTE — PROGRESS NOTES
Diagnosis: Primary osteoarthritis of right knee (M17.11)   Date of Onset: Jan 2019        Next MD visit: none scheduled  Fall Risk: standard         Precautions: n/a          Medication Changes since last visit?: No    Subjective: Patient reports 0/10 R kn holds 3 x 10 with GTB  - standing B heel raises on slantboard 3 x 10  - B L/E shuttle knee extension 6 bands with GTB above knee 2 x 10  - reassess strength x 5 minutes  - supine R hamstring/gastroc stretch with sports strap 4 x 30 seconds  - supine R quad

## 2020-02-26 ENCOUNTER — OFFICE VISIT (OUTPATIENT)
Dept: PHYSICAL THERAPY | Age: 60
End: 2020-02-26
Attending: ORTHOPAEDIC SURGERY
Payer: COMMERCIAL

## 2020-02-26 DIAGNOSIS — M17.11 PRIMARY OSTEOARTHRITIS OF RIGHT KNEE: ICD-10-CM

## 2020-02-26 PROCEDURE — 97110 THERAPEUTIC EXERCISES: CPT

## 2020-02-26 NOTE — PROGRESS NOTES
Diagnosis: Primary osteoarthritis of right knee (M17.11)   Date of Onset: Jan 2019        Next MD visit: none scheduled  Fall Risk: standard         Precautions: n/a          Medication Changes since last visit?: No    Subjective: Patient reports 0/10 R kn curl 3 x 10 with blue t-band  - standing B heel raises on slantboard (level 3)  3 x 10  - B L/E shuttle knee extension 6 bands with GTB above knee 3 x 10  - R/L L/E shuttle knee extension 5 bands 2 x 10 each  - reassess knee ROM x 3 minutes   - supine R ha

## 2020-02-28 ENCOUNTER — OFFICE VISIT (OUTPATIENT)
Dept: PHYSICAL THERAPY | Age: 60
End: 2020-02-28
Attending: ORTHOPAEDIC SURGERY
Payer: COMMERCIAL

## 2020-02-28 DIAGNOSIS — M17.11 PRIMARY OSTEOARTHRITIS OF RIGHT KNEE: ICD-10-CM

## 2020-02-28 PROCEDURE — 97110 THERAPEUTIC EXERCISES: CPT

## 2020-02-28 NOTE — PROGRESS NOTES
Diagnosis: Primary osteoarthritis of right knee (M17.11)   Date of Onset: Jan 2019        Next MD visit: none scheduled  Fall Risk: standard         Precautions: n/a          Medication Changes since last visit?: No    Subjective: Patient reports 0/10 R kn blue t-band  - standing B heel raises on slantboard (level 3)  3 x 10  - B L/E shuttle knee extension 6 bands with GTB above knee 3 x 10  - R/L L/E shuttle knee extension 5 bands 3 x 10 each  - standing R/L hip abduction/extension with YTB above knee 2 x 1 B heel raises on slantboard 2 x 10     Manual PT          Thera Activity          Modalities              Assessment: Advanced reps of current exercises without pain noted. Patient demo'd improved R knee flexion AROM this session.  PT and patient goals are

## 2020-03-02 ENCOUNTER — OFFICE VISIT (OUTPATIENT)
Dept: PHYSICAL THERAPY | Age: 60
End: 2020-03-02
Attending: ORTHOPAEDIC SURGERY
Payer: COMMERCIAL

## 2020-03-02 DIAGNOSIS — M17.11 PRIMARY OSTEOARTHRITIS OF RIGHT KNEE: ICD-10-CM

## 2020-03-02 PROCEDURE — 97110 THERAPEUTIC EXERCISES: CPT

## 2020-03-02 NOTE — PROGRESS NOTES
Diagnosis: Primary osteoarthritis of right knee (M17.11)   Date of Onset: Jan 2019        Next MD visit: none scheduled  Fall Risk: standard         Precautions: n/a          Medication Changes since last visit?: No    Subjective: Patient reports 0/10 R kn curl 3 x 10 with blue t-band  - standing B heel raises on slantboard (level 3)  3 x 10  - B L/E shuttle knee extension 6 bands with GTB above knee 3 x 10  - R/L L/E shuttle knee extension 5 bands 3 x 10 each  - standing R/L hip abduction/extension with YTB x 10   - supine R SLR with YTB @ ankle 3\" holds 1 x 10   - sidelying R hip abduction 3 x 10   - seated R LAQ 5\" holds 2 x 10   - seated R ham curl 5\" holds 3 x 10 with GTB  - standing B heel raises on slantboard 3 x 10  - B L/E shuttle knee extension 6

## 2020-03-05 ENCOUNTER — OFFICE VISIT (OUTPATIENT)
Dept: PHYSICAL THERAPY | Age: 60
End: 2020-03-05
Attending: ORTHOPAEDIC SURGERY
Payer: COMMERCIAL

## 2020-03-05 DIAGNOSIS — M17.11 PRIMARY OSTEOARTHRITIS OF RIGHT KNEE: ICD-10-CM

## 2020-03-05 PROCEDURE — 97110 THERAPEUTIC EXERCISES: CPT

## 2020-03-05 NOTE — PROGRESS NOTES
Physical Therapy Discharge Summary    Diagnosis: Primary osteoarthritis of right knee (M17.11)   Date of Onset: Jan 2019        Next MD visit: none scheduled  Fall Risk: standard         Precautions: n/a          Medication Changes since last visit?: No  A with sports strap 1 x 30 seconds  - supine R quad sets with towel under foot 10\" holds x 5   - supine R SAQ with bolster 5 second holds 2 x 10 with 4#   - supine R SLR 2 x 12 with 1.5# @ ankle    - supine SB knee flexion 3 x 10   - supine SB bridging 3 x x 10 each  - standing R/L hip abduction/extension with YTB above knee 2 x 10 each   - supine R hamstring/gastroc stretch with sports strap 3 x 30 seconds  - supine R quad sets with towel under foot 10\" holds x 10   - supine R SAQ with bolster 5 second hol with maintenance and progression of HEP   2- Pt will demo increase in R knee ROM to equal L to improve walking pattern   3- Pt will demo increase in RLE strength to 5/5 to ease ability for pt to negotiate stairs   4- Pt will report 1/10 pain or less with c

## 2020-03-09 NOTE — PROGRESS NOTES
[facility] Follow-up Visit    Ana still smoking cigarettes  - no personal hx of MI or TIA    Initial 6/30/2017 OV:  Patient was in her normal health until 4 weeks ago wherein she noted blood in her stool and BRBPR.  She admits she was constipated during this particular time, however she has SURGICAL HISTORY      right knee arthrorscopy for torn cartilage   • OTHER SURGICAL HISTORY      right rotator cuff surgery   • SHOULDER ARTHROSCOPY ROTATOR CUFF REPAIR Right 12/5/2018    Performed by Lindaann Riedel., MD at [address] [place] [address] OR   • [place] [id number] 180 tablet 1   • Rosuvastatin Calcium 10 MG Oral Tab TAKE 1 TABLET BY MOUTH  NIGHTLY 90 tablet 1   • aspirin 81 MG Oral Tab Take 81 mg by mouth daily.  Instructed to stop ASA on 11/29/18     • Glucose Blood In Vitro Strip To test blood glucose bid - For One shortly after initial OV with daniel chand on 11/16/2018 labs. Patient was LTFO. Noted (+) H. Pylori 2/3/2020. Patient presents today for evaluation of lower abdominal discomfort, heartburn and constipation - no further rectal bleeding.  She is a mildly poor his (500 mg total) by mouth 2 (two) times daily for 14 days. • Pantoprazole Sodium 40 MG Oral Tab EC 28 tablet 0     Sig: Take 1 tablet (40 mg total) by mouth 2 (two) times daily before meals for 14 days.    • Bismuth Subsalicylate (PEPTO-BISMOL) 262 MG Oral

## 2020-03-11 ENCOUNTER — OFFICE VISIT (OUTPATIENT)
Dept: GASTROENTEROLOGY | Facility: CLINIC | Age: 60
End: 2020-03-11
Payer: COMMERCIAL

## 2020-03-11 VITALS
BODY MASS INDEX: 38.98 KG/M2 | HEIGHT: 63 IN | SYSTOLIC BLOOD PRESSURE: 129 MMHG | WEIGHT: 220 LBS | HEART RATE: 69 BPM | DIASTOLIC BLOOD PRESSURE: 80 MMHG

## 2020-03-11 DIAGNOSIS — A04.8 H. PYLORI INFECTION: Primary | ICD-10-CM

## 2020-03-11 DIAGNOSIS — Z09 FOLLOW UP: ICD-10-CM

## 2020-03-11 DIAGNOSIS — R12 HEARTBURN: ICD-10-CM

## 2020-03-11 DIAGNOSIS — R10.30 LOWER ABDOMINAL PAIN: ICD-10-CM

## 2020-03-11 DIAGNOSIS — K59.00 CONSTIPATION, UNSPECIFIED CONSTIPATION TYPE: ICD-10-CM

## 2020-03-11 PROCEDURE — 99215 OFFICE O/P EST HI 40 MIN: CPT | Performed by: PHYSICIAN ASSISTANT

## 2020-03-11 RX ORDER — TETRACYCLINE HYDROCHLORIDE 500 MG/1
500 CAPSULE ORAL 4 TIMES DAILY
Qty: 56 CAPSULE | Refills: 0 | Status: SHIPPED | OUTPATIENT
Start: 2020-03-11 | End: 2020-03-25

## 2020-03-11 RX ORDER — BISMUTH SUBSALICYLATE 262 MG/1
2 TABLET, CHEWABLE ORAL
Qty: 112 TABLET | Refills: 0 | Status: SHIPPED | OUTPATIENT
Start: 2020-03-11 | End: 2020-03-25

## 2020-03-11 RX ORDER — PANTOPRAZOLE SODIUM 40 MG/1
40 TABLET, DELAYED RELEASE ORAL
Qty: 28 TABLET | Refills: 0 | Status: SHIPPED | OUTPATIENT
Start: 2020-03-11 | End: 2020-03-25

## 2020-03-11 RX ORDER — METRONIDAZOLE 500 MG/1
500 TABLET ORAL 2 TIMES DAILY
Qty: 28 TABLET | Refills: 0 | Status: SHIPPED | OUTPATIENT
Start: 2020-03-11 | End: 2020-03-25

## 2020-03-11 NOTE — PATIENT INSTRUCTIONS
1. H. Pylori infection.     The Helicobacter pylori bacteria is a very common chronic infection in the stomach (there is nothing necessarily that you did or ate to acquire this) and is associated with ulcers, abdominal discomfort and a slight risk of stomac Linzess medications in the future if your symptoms are refractory to these recommendations     5. At your next visit, we can talk about scheduling colonoscopy + upper endoscopy procedures.     6. 99 [phone] - this is the number for Oumar Salas - call

## 2020-05-15 ENCOUNTER — TELEPHONE (OUTPATIENT)
Dept: GASTROENTEROLOGY | Facility: CLINIC | Age: 60
End: 2020-05-15

## 2020-05-15 NOTE — TELEPHONE ENCOUNTER
I received a letter addressed to me regarding a recall issued [on Tetracycline] due to low out of specification dissolution test results. This patient's name was listed.  I believe she was prescribed Tetracycline in conjunction with other meds for her H. Py

## 2020-05-28 NOTE — PROGRESS NOTES
[facility] Telemedicine Visit    Parent/Caregiver verbally consents to a Telephone Check-In service on 5/29/2020    Patient understands and accepts financial responsibility for any deductible, co-insurance and/or co-pays associated with this service.     This visi constipation quite a bit  - does not drink very much water throughout the day  - in regards to lower abd/pelvic pain - she points to the lower pelvic area and states this started 2-3 weeks ago, with subsequent 4 days of vaginal bleeding after 3 years of no prep; two polyps were retrieved. 2 tubular adenomas <1cm in size were retrieved (fragments of tubular adenoma on transverse polyp, other was ascending polyp).  No diverticular disease nor hemorrhoids were found.     Social Hx:  - Current every day smoker (3 daily       Medications (Active prior to today's visit):  Current Outpatient Medications   Medication Sig Dispense Refill   • HYDROCHLOROTHIAZIDE 12.5 MG Oral Cap TAKE 1 CAPSULE BY MOUTH ONCE DAILY 90 capsule 1   • LOSARTAN POTASSIUM 50 MG Oral Tab TAKE 1 me, nor on 11/16/2018 labs. Patient was LTFO. Noted (+) H. Pylori 2/3/2020. Patient last seen 3/11/2020 for evaluation of lower abdominal discomfort, heartburn and constipation - no further rectal bleeding. She is a mildly poor historian.  Noted H. Pylori r communication. This has been done in good cheng to provide continuity of care in the best interest of the provider-patient relationship, due to the ongoing public health crisis/national emergency and because of restrictions of visitation.   There are limit

## 2020-05-29 ENCOUNTER — VIRTUAL PHONE E/M (OUTPATIENT)
Dept: GASTROENTEROLOGY | Facility: CLINIC | Age: 60
End: 2020-05-29
Payer: COMMERCIAL

## 2020-05-29 DIAGNOSIS — R12 HEARTBURN: ICD-10-CM

## 2020-05-29 DIAGNOSIS — K59.00 CONSTIPATION, UNSPECIFIED CONSTIPATION TYPE: Primary | ICD-10-CM

## 2020-05-29 DIAGNOSIS — Z09 FOLLOW UP: ICD-10-CM

## 2020-05-29 DIAGNOSIS — Z86.19 HISTORY OF HELICOBACTER PYLORI INFECTION: ICD-10-CM

## 2020-05-29 PROCEDURE — 99213 OFFICE O/P EST LOW 20 MIN: CPT | Performed by: PHYSICIAN ASSISTANT

## 2020-05-29 NOTE — PATIENT INSTRUCTIONS
Recommendations from today's visit:    - H.  Pylori stool test off of PPI x 2 weeks and antibiotics x 4 weeks  - current recall for colonoscopy is 6/2021  - continue MiraLAX as needed for constipation  - if GI health changes --> call the office   - avoid re

## 2020-06-12 ENCOUNTER — TELEPHONE (OUTPATIENT)
Dept: GASTROENTEROLOGY | Facility: CLINIC | Age: 60
End: 2020-06-12

## 2020-07-30 ENCOUNTER — APPOINTMENT (OUTPATIENT)
Dept: LAB | Facility: HOSPITAL | Age: 60
End: 2020-07-30
Attending: PHYSICIAN ASSISTANT
Payer: COMMERCIAL

## 2020-07-30 DIAGNOSIS — A04.8 H. PYLORI INFECTION: ICD-10-CM

## 2020-07-30 PROCEDURE — 87338 HPYLORI STOOL AG IA: CPT

## 2020-07-31 RX ORDER — METFORMIN HYDROCHLORIDE 500 MG/1
TABLET, EXTENDED RELEASE ORAL
Qty: 180 TABLET | Refills: 0 | Status: SHIPPED | OUTPATIENT
Start: 2020-07-31 | End: 2021-04-05

## 2020-07-31 RX ORDER — LOSARTAN POTASSIUM 50 MG/1
50 TABLET ORAL DAILY
Qty: 90 TABLET | Refills: 0 | Status: SHIPPED | OUTPATIENT
Start: 2020-07-31 | End: 2020-12-19

## 2020-07-31 RX ORDER — HYDROCHLOROTHIAZIDE 12.5 MG/1
CAPSULE, GELATIN COATED ORAL
Qty: 90 CAPSULE | Refills: 0 | Status: SHIPPED | OUTPATIENT
Start: 2020-07-31 | End: 2020-12-19

## 2020-08-04 ENCOUNTER — OFFICE VISIT (OUTPATIENT)
Dept: INTERNAL MEDICINE CLINIC | Facility: CLINIC | Age: 60
End: 2020-08-04
Payer: COMMERCIAL

## 2020-08-04 VITALS
HEART RATE: 80 BPM | WEIGHT: 211 LBS | RESPIRATION RATE: 12 BRPM | HEIGHT: 64 IN | BODY MASS INDEX: 36.02 KG/M2 | SYSTOLIC BLOOD PRESSURE: 124 MMHG | TEMPERATURE: 98 F | DIASTOLIC BLOOD PRESSURE: 74 MMHG

## 2020-08-04 DIAGNOSIS — E11.9 CONTROLLED TYPE 2 DIABETES MELLITUS WITHOUT COMPLICATION, WITHOUT LONG-TERM CURRENT USE OF INSULIN (HCC): Primary | ICD-10-CM

## 2020-08-04 DIAGNOSIS — I10 ESSENTIAL HYPERTENSION: ICD-10-CM

## 2020-08-04 DIAGNOSIS — E78.00 HYPERCHOLESTEREMIA: ICD-10-CM

## 2020-08-04 PROCEDURE — 3074F SYST BP LT 130 MM HG: CPT | Performed by: INTERNAL MEDICINE

## 2020-08-04 PROCEDURE — 3078F DIAST BP <80 MM HG: CPT | Performed by: INTERNAL MEDICINE

## 2020-08-04 PROCEDURE — 99214 OFFICE O/P EST MOD 30 MIN: CPT | Performed by: INTERNAL MEDICINE

## 2020-08-04 PROCEDURE — 90471 IMMUNIZATION ADMIN: CPT | Performed by: INTERNAL MEDICINE

## 2020-08-04 PROCEDURE — 90732 PPSV23 VACC 2 YRS+ SUBQ/IM: CPT | Performed by: INTERNAL MEDICINE

## 2020-08-04 PROCEDURE — 3008F BODY MASS INDEX DOCD: CPT | Performed by: INTERNAL MEDICINE

## 2020-08-04 NOTE — PROGRESS NOTES
HPI:    Patient ID: Yoselin Abreu is a 62year old female. Diabetes   She presents for her follow-up diabetic visit. She has type 2 diabetes mellitus. Her disease course has been stable. There are no hypoglycemic associated symptoms.  There are no diabe chronic renal disease or a hypertension causing med. Hyperlipidemia   This is a chronic problem. The current episode started more than 1 year ago. The problem is controlled.  Recent lipid tests were reviewed and are normal. Exacerbating diseases include d Normocephalic and atraumatic. Right Ear: External ear normal.   Left Ear: External ear normal.   Nose: Nose normal.   Mouth/Throat: Oropharynx is clear and moist. No oropharyngeal exudate. Eyes: Pupils are equal, round, and reactive to light.  Conjuncti controlled with current bp meds. Cpm.     Pt given pneumovax 23 today. Advised to get her flu shot this fall. No orders of the defined types were placed in this encounter.       Meds This Visit:  Requested Prescriptions      No prescriptions request

## 2020-08-05 ENCOUNTER — LAB ENCOUNTER (OUTPATIENT)
Dept: LAB | Age: 60
End: 2020-08-05
Attending: INTERNAL MEDICINE
Payer: COMMERCIAL

## 2020-08-05 DIAGNOSIS — E78.00 HYPERCHOLESTEREMIA: ICD-10-CM

## 2020-08-05 DIAGNOSIS — E11.9 CONTROLLED TYPE 2 DIABETES MELLITUS WITHOUT COMPLICATION, WITHOUT LONG-TERM CURRENT USE OF INSULIN (HCC): ICD-10-CM

## 2020-08-05 LAB
ALBUMIN SERPL-MCNC: 3.8 G/DL (ref 3.4–5)
ALBUMIN/GLOB SERPL: 1 {RATIO} (ref 1–2)
ALP LIVER SERPL-CCNC: 104 U/L (ref 46–118)
ALT SERPL-CCNC: 58 U/L (ref 13–56)
ANION GAP SERPL CALC-SCNC: 6 MMOL/L (ref 0–18)
AST SERPL-CCNC: 37 U/L (ref 15–37)
BILIRUB SERPL-MCNC: 0.3 MG/DL (ref 0.1–2)
BUN BLD-MCNC: 6 MG/DL (ref 7–18)
BUN/CREAT SERPL: 8.3 (ref 10–20)
CALCIUM BLD-MCNC: 9 MG/DL (ref 8.5–10.1)
CHLORIDE SERPL-SCNC: 106 MMOL/L (ref 98–112)
CHOLEST SMN-MCNC: 179 MG/DL (ref ?–200)
CO2 SERPL-SCNC: 27 MMOL/L (ref 21–32)
CREAT BLD-MCNC: 0.72 MG/DL (ref 0.55–1.02)
CREAT UR-SCNC: 135 MG/DL
EST. AVERAGE GLUCOSE BLD GHB EST-MCNC: 128 MG/DL (ref 68–126)
GLOBULIN PLAS-MCNC: 3.9 G/DL (ref 2.8–4.4)
GLUCOSE BLD-MCNC: 145 MG/DL (ref 70–99)
HBA1C MFR BLD HPLC: 6.1 % (ref ?–5.7)
HDLC SERPL-MCNC: 45 MG/DL (ref 40–59)
LDLC SERPL DIRECT ASSAY-MCNC: 90 MG/DL (ref ?–100)
M PROTEIN MFR SERPL ELPH: 7.7 G/DL (ref 6.4–8.2)
MICROALBUMIN UR-MCNC: 1.85 MG/DL
MICROALBUMIN/CREAT 24H UR-RTO: 13.7 UG/MG (ref ?–30)
NONHDLC SERPL-MCNC: 134 MG/DL (ref ?–130)
OSMOLALITY SERPL CALC.SUM OF ELEC: 288 MOSM/KG (ref 275–295)
PATIENT FASTING Y/N/NP: YES
PATIENT FASTING Y/N/NP: YES
POTASSIUM SERPL-SCNC: 3.6 MMOL/L (ref 3.5–5.1)
SODIUM SERPL-SCNC: 139 MMOL/L (ref 136–145)
TRIGL SERPL-MCNC: 431 MG/DL (ref 30–149)

## 2020-08-05 PROCEDURE — 82043 UR ALBUMIN QUANTITATIVE: CPT

## 2020-08-05 PROCEDURE — 36415 COLL VENOUS BLD VENIPUNCTURE: CPT

## 2020-08-05 PROCEDURE — 83721 ASSAY OF BLOOD LIPOPROTEIN: CPT

## 2020-08-05 PROCEDURE — 82570 ASSAY OF URINE CREATININE: CPT

## 2020-08-05 PROCEDURE — 80061 LIPID PANEL: CPT

## 2020-08-05 PROCEDURE — 83036 HEMOGLOBIN GLYCOSYLATED A1C: CPT

## 2020-08-05 PROCEDURE — 80053 COMPREHEN METABOLIC PANEL: CPT

## 2020-08-06 ENCOUNTER — TELEPHONE (OUTPATIENT)
Dept: INTERNAL MEDICINE CLINIC | Facility: CLINIC | Age: 60
End: 2020-08-06

## 2020-08-06 LAB — H PYLORI AG STL QL IA: NEGATIVE

## 2020-08-13 ENCOUNTER — HOSPITAL ENCOUNTER (OUTPATIENT)
Dept: MAMMOGRAPHY | Age: 60
Discharge: HOME | End: 2020-08-13
Attending: CLINICAL NURSE SPECIALIST
Payer: COMMERCIAL

## 2020-08-13 DIAGNOSIS — Z12.31 ENCOUNTER FOR SCREENING MAMMOGRAM FOR MALIGNANT NEOPLASM OF BREAST: ICD-10-CM

## 2020-08-13 PROCEDURE — 77063 BREAST TOMOSYNTHESIS BI: CPT | Performed by: CLINICAL NURSE SPECIALIST

## 2020-08-13 PROCEDURE — 77067 SCR MAMMO BI INCL CAD: CPT | Performed by: CLINICAL NURSE SPECIALIST

## 2020-10-12 ENCOUNTER — OFFICE VISIT (OUTPATIENT)
Dept: OPTOMETRY | Facility: CLINIC | Age: 60
End: 2020-10-12
Payer: COMMERCIAL

## 2020-10-12 DIAGNOSIS — E11.9 TYPE 2 DIABETES MELLITUS WITHOUT COMPLICATION, WITHOUT LONG-TERM CURRENT USE OF INSULIN (HCC): Primary | ICD-10-CM

## 2020-10-12 DIAGNOSIS — H52.4 PRESBYOPIA: ICD-10-CM

## 2020-10-12 DIAGNOSIS — H25.13 AGE-RELATED NUCLEAR CATARACT OF BOTH EYES: ICD-10-CM

## 2020-10-12 PROCEDURE — 92015 DETERMINE REFRACTIVE STATE: CPT | Performed by: OPTOMETRIST

## 2020-10-12 PROCEDURE — 92014 COMPRE OPH EXAM EST PT 1/>: CPT | Performed by: OPTOMETRIST

## 2020-10-12 RX ORDER — OMEPRAZOLE 40 MG/1
40 CAPSULE, DELAYED RELEASE ORAL DAILY
COMMUNITY
Start: 2020-09-24 | End: 2021-09-02 | Stop reason: ALTCHOICE

## 2020-10-12 NOTE — PROGRESS NOTES
Gerry Bianchi is a 62year old female. HPI:     HPI     Diabetic Eye Exam     Diabetes characteristics include Type 2, controlled with diet and taking oral medications. Duration of 3 years. Number of years diabetic 3. Number of years on pills 3.   Nu Alcohol/week: 0.0 standard drinks      Comment: social/weekends    Drug use: Yes      Types: Cannabis      Comment: daily      Medications:  Current Outpatient Medications   Medication Sig Dispense Refill   • Omeprazole 40 MG Oral Capsule Delayed Release T Right Left    Lids/Lashes Normal Normal    Conjunctiva/Sclera Pigmentation Pigmentation    Cornea Clear Clear    Anterior Chamber Deep and quiet Deep and quiet    Iris Normal Normal    Lens 1+ Nuclear sclerosis 1+ Nuclear sclerosis    Vitreous Clear Clear

## 2020-10-12 NOTE — PROGRESS NOTES
Erickson Luna is a 62year old female. HPI:     HPI     Diabetic Eye Exam     Diabetes characteristics include Type 2, controlled with diet and taking oral medications. Duration of 3 years. Number of years diabetic 3. Number of years on pills 3.   Nu Alcohol/week: 0.0 standard drinks      Comment: social/weekends    Drug use: Yes      Types: Cannabis      Comment: daily      Medications:  Current Outpatient Medications   Medication Sig Dispense Refill   • Omeprazole 40 MG Oral Capsule Delayed Release T Right Left    Lids/Lashes Normal Normal    Conjunctiva/Sclera Pigmentation Pigmentation    Cornea Clear Clear    Anterior Chamber Deep and quiet Deep and quiet    Iris Normal Normal    Lens 1+ Nuclear sclerosis 1+ Nuclear sclerosis    Vitreous Clear Clear

## 2020-10-13 ENCOUNTER — PATIENT MESSAGE (OUTPATIENT)
Dept: INTERNAL MEDICINE CLINIC | Facility: CLINIC | Age: 60
End: 2020-10-13

## 2020-10-15 NOTE — TELEPHONE ENCOUNTER
From: Gerry Bianchi  To: Claudene Newborn, MD  Sent: 10/13/2020 5:21 PM CDT  Subject: Prescription Question    I read Metformin has a recall. Should I be concerned or could this be a fake recall.     thx, Maytral

## 2020-10-15 NOTE — TELEPHONE ENCOUNTER
To: Mavis Urbina      From: Levi Alcantara RN      Created: 10/15/2020 7:53 AM        Dr Vida Malhotra asked that you call your pharmacy and find out if your specific metformin is involved in the recall.  Not all manufacturers of a medicin

## 2020-10-15 NOTE — TELEPHONE ENCOUNTER
As far as I know, it some of the metformin ER was recalled but not at all. Would suggest pt to talk to her pharmacist if metformin ER she has was part of the recall.

## 2020-12-19 RX ORDER — HYDROCHLOROTHIAZIDE 12.5 MG/1
CAPSULE, GELATIN COATED ORAL
Qty: 90 CAPSULE | Refills: 0 | Status: SHIPPED | OUTPATIENT
Start: 2020-12-19 | End: 2021-04-05

## 2020-12-19 RX ORDER — LOSARTAN POTASSIUM 50 MG/1
TABLET ORAL
Qty: 90 TABLET | Refills: 0 | Status: SHIPPED | OUTPATIENT
Start: 2020-12-19 | End: 2021-04-05

## 2021-02-08 ENCOUNTER — NURSE TRIAGE (OUTPATIENT)
Dept: INTERNAL MEDICINE CLINIC | Facility: CLINIC | Age: 61
End: 2021-02-08
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
## 2021-02-12 ENCOUNTER — OFFICE VISIT (OUTPATIENT)
Dept: INTERNAL MEDICINE CLINIC | Facility: CLINIC | Age: 61
End: 2021-02-12
Payer: COMMERCIAL

## 2021-02-12 ENCOUNTER — LAB ENCOUNTER (OUTPATIENT)
Dept: LAB | Age: 61
End: 2021-02-12
Attending: INTERNAL MEDICINE
Payer: COMMERCIAL

## 2021-02-12 VITALS
RESPIRATION RATE: 12 BRPM | DIASTOLIC BLOOD PRESSURE: 70 MMHG | SYSTOLIC BLOOD PRESSURE: 120 MMHG | TEMPERATURE: 98 F | BODY MASS INDEX: 33.43 KG/M2 | WEIGHT: 208 LBS | HEIGHT: 66 IN | HEART RATE: 83 BPM

## 2021-02-12 DIAGNOSIS — E11.9 CONTROLLED TYPE 2 DIABETES MELLITUS WITHOUT COMPLICATION, WITHOUT LONG-TERM CURRENT USE OF INSULIN (HCC): ICD-10-CM

## 2021-02-12 DIAGNOSIS — I10 ESSENTIAL HYPERTENSION: ICD-10-CM

## 2021-02-12 DIAGNOSIS — Z01.419 WELL FEMALE EXAM WITH ROUTINE GYNECOLOGICAL EXAM: ICD-10-CM

## 2021-02-12 DIAGNOSIS — E78.2 MIXED HYPERLIPIDEMIA: ICD-10-CM

## 2021-02-12 DIAGNOSIS — E11.9 CONTROLLED TYPE 2 DIABETES MELLITUS WITHOUT COMPLICATION, WITHOUT LONG-TERM CURRENT USE OF INSULIN (HCC): Primary | ICD-10-CM

## 2021-02-12 LAB
ALBUMIN SERPL-MCNC: 3.8 G/DL (ref 3.4–5)
ALBUMIN/GLOB SERPL: 0.9 {RATIO} (ref 1–2)
ALP LIVER SERPL-CCNC: 97 U/L
ALT SERPL-CCNC: 37 U/L
ANION GAP SERPL CALC-SCNC: 5 MMOL/L (ref 0–18)
AST SERPL-CCNC: 28 U/L (ref 15–37)
BILIRUB SERPL-MCNC: 0.4 MG/DL (ref 0.1–2)
BUN BLD-MCNC: 22 MG/DL (ref 7–18)
BUN/CREAT SERPL: 25.9 (ref 10–20)
CALCIUM BLD-MCNC: 9.7 MG/DL (ref 8.5–10.1)
CHLORIDE SERPL-SCNC: 104 MMOL/L (ref 98–112)
CHOLEST SMN-MCNC: 207 MG/DL (ref ?–200)
CO2 SERPL-SCNC: 29 MMOL/L (ref 21–32)
CREAT BLD-MCNC: 0.85 MG/DL
CREAT UR-SCNC: 105 MG/DL
EST. AVERAGE GLUCOSE BLD GHB EST-MCNC: 140 MG/DL (ref 68–126)
GLOBULIN PLAS-MCNC: 4.3 G/DL (ref 2.8–4.4)
GLUCOSE BLD-MCNC: 93 MG/DL (ref 70–99)
HBA1C MFR BLD HPLC: 6.5 % (ref ?–5.7)
HDLC SERPL-MCNC: 51 MG/DL (ref 40–59)
LDLC SERPL CALC-MCNC: 87 MG/DL (ref ?–100)
M PROTEIN MFR SERPL ELPH: 8.1 G/DL (ref 6.4–8.2)
MICROALBUMIN UR-MCNC: 0.8 MG/DL
MICROALBUMIN/CREAT 24H UR-RTO: 7.6 UG/MG (ref ?–30)
NONHDLC SERPL-MCNC: 156 MG/DL (ref ?–130)
OSMOLALITY SERPL CALC.SUM OF ELEC: 289 MOSM/KG (ref 275–295)
PATIENT FASTING Y/N/NP: YES
PATIENT FASTING Y/N/NP: YES
POTASSIUM SERPL-SCNC: 3.8 MMOL/L (ref 3.5–5.1)
SODIUM SERPL-SCNC: 138 MMOL/L (ref 136–145)
TRIGL SERPL-MCNC: 344 MG/DL (ref 30–149)
VLDLC SERPL CALC-MCNC: 69 MG/DL (ref 0–30)

## 2021-02-12 PROCEDURE — 3078F DIAST BP <80 MM HG: CPT | Performed by: INTERNAL MEDICINE

## 2021-02-12 PROCEDURE — 99214 OFFICE O/P EST MOD 30 MIN: CPT | Performed by: INTERNAL MEDICINE

## 2021-02-12 PROCEDURE — 82570 ASSAY OF URINE CREATININE: CPT

## 2021-02-12 PROCEDURE — 80053 COMPREHEN METABOLIC PANEL: CPT

## 2021-02-12 PROCEDURE — 80061 LIPID PANEL: CPT

## 2021-02-12 PROCEDURE — 90686 IIV4 VACC NO PRSV 0.5 ML IM: CPT | Performed by: INTERNAL MEDICINE

## 2021-02-12 PROCEDURE — 90471 IMMUNIZATION ADMIN: CPT | Performed by: INTERNAL MEDICINE

## 2021-02-12 PROCEDURE — 83036 HEMOGLOBIN GLYCOSYLATED A1C: CPT

## 2021-02-12 PROCEDURE — 36415 COLL VENOUS BLD VENIPUNCTURE: CPT

## 2021-02-12 PROCEDURE — 82043 UR ALBUMIN QUANTITATIVE: CPT

## 2021-02-12 PROCEDURE — 3008F BODY MASS INDEX DOCD: CPT | Performed by: INTERNAL MEDICINE

## 2021-02-12 PROCEDURE — 3074F SYST BP LT 130 MM HG: CPT | Performed by: INTERNAL MEDICINE

## 2021-02-12 NOTE — PROGRESS NOTES
HPI:    Patient ID: Tabitha Reed is a 62year old female. Diabetes  She presents for her follow-up diabetic visit. She has type 2 diabetes mellitus. There are no hypoglycemic associated symptoms. There are no diabetic associated symptoms.  Pertinent ne There are no associated agents to hypertension. Risk factors for coronary artery disease include diabetes mellitus, dyslipidemia, obesity and post-menopausal state. Past treatments include angiotensin blockers and lifestyle changes.  The current treatment p HISTORY      right knee arthrorscopy for torn cartilage   • OTHER SURGICAL HISTORY      right rotator cuff surgery   • SHOULDER ARTHROSCOPY ROTATOR CUFF REPAIR Right 12/5/2018    Performed by Bryant Tiwari MD at [facility] OR   • TUBAL LIGATION normal.  Bilateral monofilament/sensation of both feet is normal.  Pulsation pedal pulse exam of both lower legs/feet is normal as well. Lymphadenopathy:     She has no cervical adenopathy. Neurological: She is alert. Skin: No rash noted.  She is no

## 2021-03-17 DIAGNOSIS — Z23 NEED FOR VACCINATION: ICD-10-CM

## 2021-04-05 RX ORDER — HYDROCHLOROTHIAZIDE 12.5 MG/1
12.5 CAPSULE, GELATIN COATED ORAL DAILY
Qty: 90 CAPSULE | Refills: 1 | Status: SHIPPED | OUTPATIENT
Start: 2021-04-05 | End: 2021-07-14

## 2021-04-05 RX ORDER — METFORMIN HYDROCHLORIDE 500 MG/1
TABLET, EXTENDED RELEASE ORAL
Qty: 180 TABLET | Refills: 1 | Status: SHIPPED | OUTPATIENT
Start: 2021-04-05 | End: 2021-07-14

## 2021-04-05 RX ORDER — LOSARTAN POTASSIUM 50 MG/1
50 TABLET ORAL DAILY
Qty: 90 TABLET | Refills: 1 | Status: SHIPPED | OUTPATIENT
Start: 2021-04-05 | End: 2021-07-14

## 2021-07-14 ENCOUNTER — LAB ENCOUNTER (OUTPATIENT)
Dept: LAB | Age: 61
End: 2021-07-14
Attending: INTERNAL MEDICINE
Payer: COMMERCIAL

## 2021-07-14 ENCOUNTER — OFFICE VISIT (OUTPATIENT)
Dept: INTERNAL MEDICINE CLINIC | Facility: CLINIC | Age: 61
End: 2021-07-14
Payer: COMMERCIAL

## 2021-07-14 VITALS
SYSTOLIC BLOOD PRESSURE: 120 MMHG | BODY MASS INDEX: 33.91 KG/M2 | HEART RATE: 86 BPM | DIASTOLIC BLOOD PRESSURE: 77 MMHG | WEIGHT: 211 LBS | HEIGHT: 66 IN

## 2021-07-14 DIAGNOSIS — E11.9 CONTROLLED TYPE 2 DIABETES MELLITUS WITHOUT COMPLICATION, WITHOUT LONG-TERM CURRENT USE OF INSULIN (HCC): Primary | ICD-10-CM

## 2021-07-14 DIAGNOSIS — Z12.31 ENCOUNTER FOR SCREENING MAMMOGRAM FOR BREAST CANCER: ICD-10-CM

## 2021-07-14 DIAGNOSIS — I10 ESSENTIAL HYPERTENSION: ICD-10-CM

## 2021-07-14 DIAGNOSIS — E78.2 MIXED HYPERLIPIDEMIA: ICD-10-CM

## 2021-07-14 DIAGNOSIS — R60.0 BILATERAL LEG EDEMA: ICD-10-CM

## 2021-07-14 DIAGNOSIS — K92.1 HEMATOCHEZIA: ICD-10-CM

## 2021-07-14 DIAGNOSIS — E11.9 CONTROLLED TYPE 2 DIABETES MELLITUS WITHOUT COMPLICATION, WITHOUT LONG-TERM CURRENT USE OF INSULIN (HCC): ICD-10-CM

## 2021-07-14 LAB
ALBUMIN SERPL-MCNC: 3.8 G/DL (ref 3.4–5)
ALBUMIN/GLOB SERPL: 0.9 {RATIO} (ref 1–2)
ALP LIVER SERPL-CCNC: 90 U/L
ALT SERPL-CCNC: 33 U/L
ANION GAP SERPL CALC-SCNC: 9 MMOL/L (ref 0–18)
AST SERPL-CCNC: 20 U/L (ref 15–37)
BILIRUB SERPL-MCNC: 0.4 MG/DL (ref 0.1–2)
BILIRUB UR QL: NEGATIVE
BUN BLD-MCNC: 11 MG/DL (ref 7–18)
BUN/CREAT SERPL: 16.7 (ref 10–20)
CALCIUM BLD-MCNC: 10.4 MG/DL (ref 8.5–10.1)
CARTRIDGE LOT#: 788 NUMERIC
CHLORIDE SERPL-SCNC: 104 MMOL/L (ref 98–112)
CHOLEST SMN-MCNC: 239 MG/DL (ref ?–200)
CO2 SERPL-SCNC: 28 MMOL/L (ref 21–32)
COLOR UR: YELLOW
CREAT BLD-MCNC: 0.66 MG/DL
CREAT UR-SCNC: 278 MG/DL
GLOBULIN PLAS-MCNC: 4.4 G/DL (ref 2.8–4.4)
GLUCOSE BLD-MCNC: 92 MG/DL (ref 70–99)
GLUCOSE UR-MCNC: NEGATIVE MG/DL
HDLC SERPL-MCNC: 52 MG/DL (ref 40–59)
HEMOGLOBIN A1C: 6.1 % (ref 4.3–5.6)
HYALINE CASTS #/AREA URNS AUTO: PRESENT /LPF
KETONES UR-MCNC: NEGATIVE MG/DL
LDLC SERPL CALC-MCNC: 138 MG/DL (ref ?–100)
M PROTEIN MFR SERPL ELPH: 8.2 G/DL (ref 6.4–8.2)
MICROALBUMIN UR-MCNC: 4.48 MG/DL
MICROALBUMIN/CREAT 24H UR-RTO: 16.1 UG/MG (ref ?–30)
NITRITE UR QL STRIP.AUTO: NEGATIVE
NONHDLC SERPL-MCNC: 187 MG/DL (ref ?–130)
OSMOLALITY SERPL CALC.SUM OF ELEC: 291 MOSM/KG (ref 275–295)
PATIENT FASTING Y/N/NP: YES
PATIENT FASTING Y/N/NP: YES
PH UR: 5 [PH] (ref 5–8)
POTASSIUM SERPL-SCNC: 3.5 MMOL/L (ref 3.5–5.1)
PROT UR-MCNC: 30 MG/DL
RBC #/AREA URNS AUTO: >10 /HPF
SODIUM SERPL-SCNC: 141 MMOL/L (ref 136–145)
SP GR UR STRIP: 1.02 (ref 1–1.03)
TRIGL SERPL-MCNC: 274 MG/DL (ref 30–149)
UROBILINOGEN UR STRIP-ACNC: <2
VLDLC SERPL CALC-MCNC: 51 MG/DL (ref 0–30)

## 2021-07-14 PROCEDURE — 82043 UR ALBUMIN QUANTITATIVE: CPT

## 2021-07-14 PROCEDURE — 3044F HG A1C LEVEL LT 7.0%: CPT | Performed by: INTERNAL MEDICINE

## 2021-07-14 PROCEDURE — 99214 OFFICE O/P EST MOD 30 MIN: CPT | Performed by: INTERNAL MEDICINE

## 2021-07-14 PROCEDURE — 36416 COLLJ CAPILLARY BLOOD SPEC: CPT | Performed by: INTERNAL MEDICINE

## 2021-07-14 PROCEDURE — 36415 COLL VENOUS BLD VENIPUNCTURE: CPT

## 2021-07-14 PROCEDURE — 80053 COMPREHEN METABOLIC PANEL: CPT

## 2021-07-14 PROCEDURE — 83036 HEMOGLOBIN GLYCOSYLATED A1C: CPT | Performed by: INTERNAL MEDICINE

## 2021-07-14 PROCEDURE — 80061 LIPID PANEL: CPT

## 2021-07-14 PROCEDURE — 3074F SYST BP LT 130 MM HG: CPT | Performed by: INTERNAL MEDICINE

## 2021-07-14 PROCEDURE — 3008F BODY MASS INDEX DOCD: CPT | Performed by: INTERNAL MEDICINE

## 2021-07-14 PROCEDURE — 3061F NEG MICROALBUMINURIA REV: CPT | Performed by: INTERNAL MEDICINE

## 2021-07-14 PROCEDURE — 82570 ASSAY OF URINE CREATININE: CPT

## 2021-07-14 PROCEDURE — 3078F DIAST BP <80 MM HG: CPT | Performed by: INTERNAL MEDICINE

## 2021-07-14 PROCEDURE — 81001 URINALYSIS AUTO W/SCOPE: CPT

## 2021-07-14 RX ORDER — ROSUVASTATIN CALCIUM 10 MG/1
TABLET, COATED ORAL
Qty: 90 TABLET | Refills: 1 | Status: SHIPPED | OUTPATIENT
Start: 2021-07-14 | End: 2021-10-18

## 2021-07-14 RX ORDER — LOSARTAN POTASSIUM 50 MG/1
50 TABLET ORAL DAILY
Qty: 90 TABLET | Refills: 1 | Status: SHIPPED | OUTPATIENT
Start: 2021-07-14 | End: 2021-10-18

## 2021-07-14 RX ORDER — HYDROCHLOROTHIAZIDE 12.5 MG/1
12.5 CAPSULE, GELATIN COATED ORAL DAILY
Qty: 90 CAPSULE | Refills: 1 | Status: SHIPPED | OUTPATIENT
Start: 2021-07-14 | End: 2021-10-18

## 2021-07-14 RX ORDER — METFORMIN HYDROCHLORIDE 500 MG/1
TABLET, EXTENDED RELEASE ORAL
Qty: 180 TABLET | Refills: 1 | Status: SHIPPED | OUTPATIENT
Start: 2021-07-14 | End: 2021-10-18
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
## 2021-07-15 ENCOUNTER — TELEPHONE (OUTPATIENT)
Dept: INTERNAL MEDICINE CLINIC | Facility: CLINIC | Age: 61
End: 2021-07-15

## 2021-07-15 DIAGNOSIS — R82.90 ABNORMAL URINALYSIS: Primary | ICD-10-CM

## 2021-07-15 DIAGNOSIS — E83.52 HYPERCALCEMIA: ICD-10-CM

## 2021-07-18 ENCOUNTER — PATIENT MESSAGE (OUTPATIENT)
Dept: INTERNAL MEDICINE CLINIC | Facility: CLINIC | Age: 61
End: 2021-07-18

## 2021-07-19 ENCOUNTER — NURSE TRIAGE (OUTPATIENT)
Dept: INTERNAL MEDICINE CLINIC | Facility: CLINIC | Age: 61
End: 2021-07-19

## 2021-07-19 ENCOUNTER — LAB ENCOUNTER (OUTPATIENT)
Dept: LAB | Age: 61
End: 2021-07-19
Attending: INTERNAL MEDICINE
Payer: COMMERCIAL

## 2021-07-19 ENCOUNTER — PATIENT MESSAGE (OUTPATIENT)
Dept: INTERNAL MEDICINE CLINIC | Facility: CLINIC | Age: 61
End: 2021-07-19

## 2021-07-19 DIAGNOSIS — R82.90 ABNORMAL URINALYSIS: ICD-10-CM

## 2021-07-19 DIAGNOSIS — E83.52 HYPERCALCEMIA: ICD-10-CM

## 2021-07-19 LAB
ALBUMIN SERPL-MCNC: 3.5 G/DL (ref 3.4–5)
BILIRUB UR QL: NEGATIVE
CALCIUM BLD-MCNC: 10 MG/DL (ref 8.5–10.1)
COLOR UR: YELLOW
GLUCOSE UR-MCNC: NEGATIVE MG/DL
GRAN CASTS #/AREA URNS LPF: PRESENT /LPF
HGB UR QL STRIP.AUTO: NEGATIVE
HYALINE CASTS #/AREA URNS AUTO: PRESENT /LPF
KETONES UR-MCNC: NEGATIVE MG/DL
NITRITE UR QL STRIP.AUTO: NEGATIVE
PH UR: 5 [PH] (ref 5–8)
PROT UR-MCNC: 30 MG/DL
PTH-INTACT SERPL-MCNC: 27.5 PG/ML (ref 18.5–88)
SP GR UR STRIP: 1.02 (ref 1–1.03)
UROBILINOGEN UR STRIP-ACNC: 2

## 2021-07-19 PROCEDURE — 81001 URINALYSIS AUTO W/SCOPE: CPT

## 2021-07-19 PROCEDURE — 83970 ASSAY OF PARATHORMONE: CPT

## 2021-07-19 PROCEDURE — 36415 COLL VENOUS BLD VENIPUNCTURE: CPT

## 2021-07-19 PROCEDURE — 87077 CULTURE AEROBIC IDENTIFY: CPT

## 2021-07-19 PROCEDURE — 87186 SC STD MICRODIL/AGAR DIL: CPT

## 2021-07-19 PROCEDURE — 82310 ASSAY OF CALCIUM: CPT

## 2021-07-19 PROCEDURE — 82040 ASSAY OF SERUM ALBUMIN: CPT

## 2021-07-19 PROCEDURE — 87086 URINE CULTURE/COLONY COUNT: CPT

## 2021-07-19 NOTE — TELEPHONE ENCOUNTER
RN - Please triage abdominal pain, thank you    Last office Visit 7/14/21 - with various labs drawn. Per Result Notes, patient was advised to repeat urine and blood tests, however she did not have these done. RN reminded her via [employer].

## 2021-07-19 NOTE — TELEPHONE ENCOUNTER
From: Ana Ortega  To: Francisca Doty MD  Sent: 7/19/2021 10:51 AM CDT  Subject: Other    Haylee Roads thank you. I’ll wait for the call. Feeling a bit better today.

## 2021-07-19 NOTE — TELEPHONE ENCOUNTER
From: Mick Saavedra  To: Elizabeth Leone MD  Sent: 7/18/2021 11:06 AM CDT  Subject: Non-Urgent Medical Question    I rcvd the covid vax ([employer])on Wed July 14, since then I've had a few side effects. Nauceous vomit once and chills.  Now I hav

## 2021-07-19 NOTE — TELEPHONE ENCOUNTER
----- [facility] sent at 7/18/2021 11:06 AM CDT -----  Regarding: Non-Urgent Medical Question  Contact: [phone]  I rcvd the covid vax ([employer])on Wed July 14, since then I've had a few side effects.  Nauceous vomit once and ch

## 2021-07-19 NOTE — TELEPHONE ENCOUNTER
See Acute Telephone Encounter. Closing this Encounter. Responded to patient via [employer] to check labs as advised per Results notes 7/14/21. Routed to Triage to triage Abdominal Pain.

## 2021-07-20 ENCOUNTER — OFFICE VISIT (OUTPATIENT)
Dept: INTERNAL MEDICINE CLINIC | Facility: CLINIC | Age: 61
End: 2021-07-20
Payer: COMMERCIAL

## 2021-07-20 ENCOUNTER — HOSPITAL ENCOUNTER (OUTPATIENT)
Dept: GENERAL RADIOLOGY | Age: 61
Discharge: HOME | End: 2021-07-20
Attending: INTERNAL MEDICINE
Payer: COMMERCIAL

## 2021-07-20 ENCOUNTER — LAB ENCOUNTER (OUTPATIENT)
Dept: LAB | Age: 61
End: 2021-07-20
Attending: INTERNAL MEDICINE
Payer: COMMERCIAL

## 2021-07-20 VITALS
OXYGEN SATURATION: 99 % | HEART RATE: 80 BPM | DIASTOLIC BLOOD PRESSURE: 70 MMHG | SYSTOLIC BLOOD PRESSURE: 124 MMHG | RESPIRATION RATE: 16 BRPM | TEMPERATURE: 97 F | HEIGHT: 64 IN | WEIGHT: 212 LBS | BODY MASS INDEX: 36.19 KG/M2

## 2021-07-20 DIAGNOSIS — T50.B95A ADVERSE EFFECT OF COVID-19 VACCINE: ICD-10-CM

## 2021-07-20 DIAGNOSIS — R82.90 ABNORMAL FINDING ON URINALYSIS: ICD-10-CM

## 2021-07-20 DIAGNOSIS — T50.B95A ADVERSE EFFECT OF COVID-19 VACCINE: Primary | ICD-10-CM

## 2021-07-20 LAB
ANION GAP SERPL CALC-SCNC: 6 MMOL/L (ref 0–18)
BASOPHILS # BLD AUTO: 0.1 X10(3) UL (ref 0–0.2)
BASOPHILS NFR BLD AUTO: 1.1 %
BUN BLD-MCNC: 19 MG/DL (ref 7–18)
BUN/CREAT SERPL: 27.5 (ref 10–20)
CALCIUM BLD-MCNC: 10.1 MG/DL (ref 8.5–10.1)
CHLORIDE SERPL-SCNC: 104 MMOL/L (ref 98–112)
CO2 SERPL-SCNC: 28 MMOL/L (ref 21–32)
CREAT BLD-MCNC: 0.69 MG/DL
DEPRECATED RDW RBC AUTO: 40.5 FL (ref 35.1–46.3)
EOSINOPHIL # BLD AUTO: 0.2 X10(3) UL (ref 0–0.7)
EOSINOPHIL NFR BLD AUTO: 2.2 %
ERYTHROCYTE [DISTWIDTH] IN BLOOD BY AUTOMATED COUNT: 12.1 % (ref 11–15)
GLUCOSE BLD-MCNC: 99 MG/DL (ref 70–99)
HCT VFR BLD AUTO: 39.5 %
HGB BLD-MCNC: 12.8 G/DL
IMM GRANULOCYTES # BLD AUTO: 0.1 X10(3) UL (ref 0–1)
IMM GRANULOCYTES NFR BLD: 1.1 %
LYMPHOCYTES # BLD AUTO: 2.9 X10(3) UL (ref 1–4)
LYMPHOCYTES NFR BLD AUTO: 31.3 %
MCH RBC QN AUTO: 29.6 PG (ref 26–34)
MCHC RBC AUTO-ENTMCNC: 32.4 G/DL (ref 31–37)
MCV RBC AUTO: 91.4 FL
MONOCYTES # BLD AUTO: 0.85 X10(3) UL (ref 0.1–1)
MONOCYTES NFR BLD AUTO: 9.2 %
NEUTROPHILS # BLD AUTO: 5.11 X10 (3) UL (ref 1.5–7.7)
NEUTROPHILS # BLD AUTO: 5.11 X10(3) UL (ref 1.5–7.7)
NEUTROPHILS NFR BLD AUTO: 55.1 %
OSMOLALITY SERPL CALC.SUM OF ELEC: 288 MOSM/KG (ref 275–295)
PATIENT FASTING Y/N/NP: NO
PLATELET # BLD AUTO: 317 10(3)UL (ref 150–450)
POTASSIUM SERPL-SCNC: 3.5 MMOL/L (ref 3.5–5.1)
RBC # BLD AUTO: 4.32 X10(6)UL
SODIUM SERPL-SCNC: 138 MMOL/L (ref 136–145)
WBC # BLD AUTO: 9.3 X10(3) UL (ref 4–11)

## 2021-07-20 PROCEDURE — 36415 COLL VENOUS BLD VENIPUNCTURE: CPT

## 2021-07-20 PROCEDURE — 3074F SYST BP LT 130 MM HG: CPT | Performed by: INTERNAL MEDICINE

## 2021-07-20 PROCEDURE — 3008F BODY MASS INDEX DOCD: CPT | Performed by: INTERNAL MEDICINE

## 2021-07-20 PROCEDURE — 80048 BASIC METABOLIC PNL TOTAL CA: CPT

## 2021-07-20 PROCEDURE — 3078F DIAST BP <80 MM HG: CPT | Performed by: INTERNAL MEDICINE

## 2021-07-20 PROCEDURE — 71046 X-RAY EXAM CHEST 2 VIEWS: CPT | Performed by: INTERNAL MEDICINE

## 2021-07-20 PROCEDURE — 85025 COMPLETE CBC W/AUTO DIFF WBC: CPT

## 2021-07-20 PROCEDURE — 99213 OFFICE O/P EST LOW 20 MIN: CPT | Performed by: INTERNAL MEDICINE

## 2021-07-20 NOTE — PROGRESS NOTES
HPI/Subjective:     Patient ID: Gerry Bianchi is a 62year old female.     Patient presents today with complaints of having fevers, vomiting,watery non bloody  diarrhea, diffuse body aches, mld headaches, upper back pain, fatigued and body malaise started 10/20/2018   • Obesity    • Rheumatoid arthritis ([facility] [address]    • Visual impairment     glasses      Past Surgical History:   Procedure Laterality Date   • ARTHROSCOPY OF JOINT UNLISTED Right     knee   •      • CHOLECYSTECTOMY         • COLONOSCOPY Abdomen is flat. Bowel sounds are normal. There is no distension. Palpations: Abdomen is soft. There is no mass. Tenderness: There is no abdominal tenderness. There is no guarding. Musculoskeletal:      Right lower leg: No edema.       Left lowe to ER if there is any worsening of her  symptoms. Pt agreed.         (R82.90) Abnormal finding on urinalysis  Plan: pt has persistent pyuria, and waiting for urine culture. However, there were also hyaline and granular cast typically seen only with ATN.  Si

## 2021-07-21 ENCOUNTER — TELEPHONE (OUTPATIENT)
Dept: INTERNAL MEDICINE CLINIC | Facility: CLINIC | Age: 61
End: 2021-07-21

## 2021-07-21 LAB — SARS-COV-2 RNA RESP QL NAA+PROBE: NOT DETECTED

## 2021-07-21 RX ORDER — NITROFURANTOIN 25; 75 MG/1; MG/1
100 CAPSULE ORAL 2 TIMES DAILY
Qty: 14 CAPSULE | Refills: 0 | Status: SHIPPED | OUTPATIENT
Start: 2021-07-21 | End: 2021-11-16

## 2021-07-22 ENCOUNTER — TELEPHONE (OUTPATIENT)
Dept: INTERNAL MEDICINE CLINIC | Facility: CLINIC | Age: 61
End: 2021-07-22

## 2021-07-22 RX ORDER — CIPROFLOXACIN 250 MG/1
250 TABLET, FILM COATED ORAL 2 TIMES DAILY
Qty: 14 TABLET | Refills: 0 | Status: SHIPPED | OUTPATIENT
Start: 2021-07-22 | End: 2021-11-16

## 2021-07-22 NOTE — TELEPHONE ENCOUNTER
Patient was left a message to call back. Transfer to triage dept [id number]  Addendum: mychart comment sent to patient.

## 2021-07-22 NOTE — TELEPHONE ENCOUNTER
pls call pt; final urine culture and abx sensitivity is back  She has 2 bacteria growing in urine culture and one is not sensitive to macrobid we gave her yesterday and also resistant to other antibiotics  so we will need to change it to another antibiotic

## 2021-08-17 ENCOUNTER — HOSPITAL ENCOUNTER (OUTPATIENT)
Dept: CV DIAGNOSTICS | Facility: HOSPITAL | Age: 61
Discharge: HOME | End: 2021-08-17
Attending: INTERNAL MEDICINE
Payer: COMMERCIAL

## 2021-08-17 DIAGNOSIS — R60.0 BILATERAL LEG EDEMA: ICD-10-CM

## 2021-08-17 PROCEDURE — 93306 TTE W/DOPPLER COMPLETE: CPT | Performed by: INTERNAL MEDICINE

## 2021-08-20 ENCOUNTER — TELEPHONE (OUTPATIENT)
Dept: INTERNAL MEDICINE CLINIC | Facility: CLINIC | Age: 61
End: 2021-08-20

## 2021-08-20 DIAGNOSIS — I51.89 GRADE I DIASTOLIC DYSFUNCTION: Primary | ICD-10-CM

## 2021-08-30 NOTE — H&P
[address] [facility] [address] Gastroenterology                                                                                                               Reason for consult: h kg)  07/14/21 : 211 lb (95.7 kg)  02/12/21 : 208 lb (94.3 kg)  08/04/20 : 211 lb (95.7 kg)  03/11/20 : 220 lb (99.8 kg)       History, Medications, Allergies, ROS:      Past Medical History:   Diagnosis Date   • Colon polyp 2011   • Diabetes ([facility] [address]    • Ess gastro office. May substitute with Trilyte/generic equivalent if needed. 4000 mL 0   • Ciprofloxacin HCl 250 MG Oral Tab Take 1 tablet (250 mg total) by mouth 2 (two) times daily.  14 tablet 0   • Nitrofurantoin Monohyd Macro (MACROBID) 100 MG Oral Cap Take hepatomegaly  MSK: No redness, no warmth, no swelling of joints  SKIN: No jaundice, no erythema, no rashes  HEMATOLOGIC: No bleeding, no bruising  NEURO: Alert and interactive, normal gait    Labs/Imaging/Procedures:     Recent Results (from the past 8760 from comparison 13.5 (2020). Will repeat cbc now. She has been using miralax on prn basis and recommended starting fiber as suspect bleeding likely hemorrhoidal. H/o cln polyps 2016 and denies a fhx gi malignancy. Plan for cln now.     #gerd  #h/or h.pylo every morning before breakfast.   • PEG 3350-KCl-Na Bicarb-NaCl (TRILYTE) 420 g Oral Recon Soln 4000 mL 0     Sig: Take prep as directed by gastro office. May substitute with Trilyte/generic equivalent if needed.        Imaging & Referrals:  None    ENDOSCO

## 2021-09-02 ENCOUNTER — TELEPHONE (OUTPATIENT)
Dept: GASTROENTEROLOGY | Facility: CLINIC | Age: 61
End: 2021-09-02

## 2021-09-02 ENCOUNTER — OFFICE VISIT (OUTPATIENT)
Dept: GASTROENTEROLOGY | Facility: CLINIC | Age: 61
End: 2021-09-02
Payer: COMMERCIAL

## 2021-09-02 VITALS
HEART RATE: 75 BPM | TEMPERATURE: 98 F | HEIGHT: 64 IN | BODY MASS INDEX: 36.77 KG/M2 | WEIGHT: 215.38 LBS | SYSTOLIC BLOOD PRESSURE: 117 MMHG | DIASTOLIC BLOOD PRESSURE: 71 MMHG

## 2021-09-02 DIAGNOSIS — K59.00 CONSTIPATION, UNSPECIFIED CONSTIPATION TYPE: ICD-10-CM

## 2021-09-02 DIAGNOSIS — K92.1 HEMATOCHEZIA: Primary | ICD-10-CM

## 2021-09-02 DIAGNOSIS — K25.9 GASTRIC ULCER DUE TO HELICOBACTER PYLORI, UNSPECIFIED CHRONICITY: ICD-10-CM

## 2021-09-02 DIAGNOSIS — R19.5 DARK STOOLS: ICD-10-CM

## 2021-09-02 DIAGNOSIS — R10.32 BILATERAL LOWER ABDOMINAL CRAMPING: ICD-10-CM

## 2021-09-02 DIAGNOSIS — R11.2 NAUSEA AND VOMITING, INTRACTABILITY OF VOMITING NOT SPECIFIED, UNSPECIFIED VOMITING TYPE: ICD-10-CM

## 2021-09-02 DIAGNOSIS — Z86.010 PERSONAL HISTORY OF COLONIC POLYPS: ICD-10-CM

## 2021-09-02 DIAGNOSIS — R11.0 NAUSEA: ICD-10-CM

## 2021-09-02 DIAGNOSIS — B96.81 GASTRIC ULCER DUE TO HELICOBACTER PYLORI, UNSPECIFIED CHRONICITY: ICD-10-CM

## 2021-09-02 DIAGNOSIS — K21.9 GASTROESOPHAGEAL REFLUX DISEASE WITHOUT ESOPHAGITIS: ICD-10-CM

## 2021-09-02 DIAGNOSIS — R10.32 BILATERAL LOWER ABDOMINAL PAIN: ICD-10-CM

## 2021-09-02 DIAGNOSIS — K21.9 GASTROESOPHAGEAL REFLUX DISEASE, UNSPECIFIED WHETHER ESOPHAGITIS PRESENT: ICD-10-CM

## 2021-09-02 DIAGNOSIS — A04.8 HELICOBACTER PYLORI (H. PYLORI) INFECTION: ICD-10-CM

## 2021-09-02 DIAGNOSIS — R10.31 BILATERAL LOWER ABDOMINAL PAIN: ICD-10-CM

## 2021-09-02 DIAGNOSIS — R10.31 BILATERAL LOWER ABDOMINAL CRAMPING: ICD-10-CM

## 2021-09-02 PROCEDURE — 3078F DIAST BP <80 MM HG: CPT | Performed by: NURSE PRACTITIONER

## 2021-09-02 PROCEDURE — 99215 OFFICE O/P EST HI 40 MIN: CPT | Performed by: NURSE PRACTITIONER

## 2021-09-02 PROCEDURE — 3074F SYST BP LT 130 MM HG: CPT | Performed by: NURSE PRACTITIONER

## 2021-09-02 PROCEDURE — 3008F BODY MASS INDEX DOCD: CPT | Performed by: NURSE PRACTITIONER

## 2021-09-02 RX ORDER — POLYETHYLENE GLYCOL 3350, SODIUM CHLORIDE, SODIUM BICARBONATE, POTASSIUM CHLORIDE 420; 11.2; 5.72; 1.48 G/4L; G/4L; G/4L; G/4L
POWDER, FOR SOLUTION ORAL
Qty: 4000 ML | Refills: 0 | Status: SHIPPED | OUTPATIENT
Start: 2021-09-02 | End: 2021-11-16

## 2021-09-02 RX ORDER — PANTOPRAZOLE SODIUM 40 MG/1
40 TABLET, DELAYED RELEASE ORAL
Qty: 30 TABLET | Refills: 3 | Status: SHIPPED | OUTPATIENT
Start: 2021-09-02 | End: 2021-11-16

## 2021-09-02 NOTE — TELEPHONE ENCOUNTER
Scheduled for:  Colonoscopy 15431 EGD [address]1411  Provider Name:  Dr Lillie Miner  Date:  11/1/21  Location:  [facility]  Sedation:  MAC  Time:  1:30pm (pt is aware that [address] will call the day before to confirm arrival time)   Prep:  Trilyte  Meds/Allergies Reconciled?: Michelle

## 2021-09-02 NOTE — PATIENT INSTRUCTIONS
-start fibercon or citrucel  -labs  -pantoprazole 40 mg/daily  -reflux diet modification  -avoid marijuana-may be contributing to nausea and vomiting  -er if condition    1.  Schedule colonoscopy/egd with DULCE MARIA w/ Dr. Mayr Riojas [Diagnosis: hematochezia, constip tight-fitting clothes  · Limit aspirin and ibuprofen  · Stop smoking     Néstor last reviewed this educational content on 6/1/2019  © 9026-3978 [employer]. [address] [facility], [address] [place] [address]. All rights reserved.  This information is

## 2021-09-13 ENCOUNTER — HOSPITAL ENCOUNTER (OUTPATIENT)
Dept: MAMMOGRAPHY | Age: 61
Discharge: HOME | End: 2021-09-13
Attending: INTERNAL MEDICINE
Payer: COMMERCIAL

## 2021-09-13 DIAGNOSIS — Z12.31 ENCOUNTER FOR SCREENING MAMMOGRAM FOR BREAST CANCER: ICD-10-CM

## 2021-09-13 PROCEDURE — 77067 SCR MAMMO BI INCL CAD: CPT | Performed by: INTERNAL MEDICINE

## 2021-09-13 PROCEDURE — 77063 BREAST TOMOSYNTHESIS BI: CPT | Performed by: INTERNAL MEDICINE

## 2021-10-18 RX ORDER — METFORMIN HYDROCHLORIDE 500 MG/1
TABLET, EXTENDED RELEASE ORAL
Qty: 180 TABLET | Refills: 0 | Status: SHIPPED | OUTPATIENT
Start: 2021-10-18 | End: 2022-01-28

## 2021-10-18 RX ORDER — ROSUVASTATIN CALCIUM 10 MG/1
TABLET, COATED ORAL
Qty: 90 TABLET | Refills: 0 | Status: SHIPPED | OUTPATIENT
Start: 2021-10-18 | End: 2022-01-28

## 2021-10-18 RX ORDER — LOSARTAN POTASSIUM 50 MG/1
TABLET ORAL
Qty: 90 TABLET | Refills: 0 | Status: SHIPPED | OUTPATIENT
Start: 2021-10-18 | End: 2022-01-28

## 2021-10-18 RX ORDER — HYDROCHLOROTHIAZIDE 12.5 MG/1
CAPSULE, GELATIN COATED ORAL
Qty: 90 CAPSULE | Refills: 0 | Status: SHIPPED | OUTPATIENT
Start: 2021-10-18 | End: 2022-01-28

## 2021-10-22 ENCOUNTER — TELEPHONE (OUTPATIENT)
Dept: GASTROENTEROLOGY | Facility: CLINIC | Age: 61
End: 2021-10-22

## 2021-10-22 RX ORDER — PANTOPRAZOLE SODIUM 40 MG/1
TABLET, DELAYED RELEASE ORAL
Qty: 90 TABLET | Refills: 0 | OUTPATIENT
Start: 2021-10-22

## 2021-10-22 NOTE — TELEPHONE ENCOUNTER
[address] directly in regards to request received below. Last refill sent e-scribed included 3 refills. Spoke with Lati & verified patient name and . Informed she picked up a 90-day supply 2021.      Too early for reque

## 2021-10-22 NOTE — TELEPHONE ENCOUNTER
Pharmacy requesting refill:        Current Outpatient Medications:     •  pantoprazole 40 MG Oral Tab EC, Take 1 tablet (40 mg total) by mouth every morning before breakfast., Disp: 30 tablet, Rfl: 3

## 2021-10-29 ENCOUNTER — LAB REQUISITION (OUTPATIENT)
Dept: SURGERY | Age: 61
End: 2021-10-29
Payer: COMMERCIAL

## 2021-10-29 DIAGNOSIS — Z01.818 PREOP EXAMINATION: ICD-10-CM

## 2021-11-01 ENCOUNTER — SURGERY CENTER DOCUMENTATION (OUTPATIENT)
Dept: SURGERY | Age: 61
End: 2021-11-01

## 2021-11-01 ENCOUNTER — LAB REQUISITION (OUTPATIENT)
Dept: SURGERY | Age: 61
End: 2021-11-01
Payer: COMMERCIAL

## 2021-11-01 DIAGNOSIS — K63.5 POLYP OF COLON, UNSPECIFIED PART OF COLON, UNSPECIFIED TYPE: ICD-10-CM

## 2021-11-01 DIAGNOSIS — K21.9 GASTROESOPHAGEAL REFLUX DISEASE, UNSPECIFIED WHETHER ESOPHAGITIS PRESENT: ICD-10-CM

## 2021-11-01 DIAGNOSIS — R10.31 RLQ ABDOMINAL PAIN: ICD-10-CM

## 2021-11-01 DIAGNOSIS — K59.00 CONSTIPATION, UNSPECIFIED CONSTIPATION TYPE: ICD-10-CM

## 2021-11-01 DIAGNOSIS — R11.0 NAUSEA: ICD-10-CM

## 2021-11-01 DIAGNOSIS — K92.1 MELENA: ICD-10-CM

## 2021-11-01 DIAGNOSIS — R11.2 NAUSEA WITH VOMITING: ICD-10-CM

## 2021-11-01 DIAGNOSIS — Z86.010 PERSONAL HISTORY OF COLONIC POLYPS: ICD-10-CM

## 2021-11-01 DIAGNOSIS — K44.9 HIATAL HERNIA: ICD-10-CM

## 2021-11-01 DIAGNOSIS — R19.5 ABNORMAL FECES: ICD-10-CM

## 2021-11-01 PROCEDURE — 88312 SPECIAL STAINS GROUP 1: CPT | Performed by: INTERNAL MEDICINE

## 2021-11-01 PROCEDURE — 45385 COLONOSCOPY W/LESION REMOVAL: CPT | Performed by: INTERNAL MEDICINE

## 2021-11-01 PROCEDURE — 43239 EGD BIOPSY SINGLE/MULTIPLE: CPT | Performed by: INTERNAL MEDICINE

## 2021-11-01 PROCEDURE — 88305 TISSUE EXAM BY PATHOLOGIST: CPT | Performed by: INTERNAL MEDICINE

## 2021-11-01 NOTE — PROCEDURES
ESOPHAGOGASTRODUODENOSCOPY (EGD) & COLONOSCOPY REPORT    Isa Aguirre     1960 Age 62year old   PCP Marizol Ly MD Endoscopist Luciana Darby MD     Date of procedure: 21    Location: [address] (EO folds. Photodocumentation was obtained. The bowel prep was good. Views of the colon were good with washing. I then carefully withdrew the instrument from the patient who tolerated the procedure well. Complications: None    EGD findings:      1.  Esophag primary etiology of symptoms. · CLN shows four polyps (largest was 12 mm) s/p removal, diverticulosis and internal hemorrhoids. Recommend:  · Await pathology. Repeat CLN, likely in 3 years.  If new signs or symptoms develop, colonoscopy may need to be

## 2021-11-05 NOTE — PROGRESS NOTES
Health maintenance updated. Last colonoscopy done 11/1/21. 3 year recall placed into [facility], next due on 11/1/24 per Dr. Smita Peres.

## 2021-11-11 RX ORDER — PANTOPRAZOLE SODIUM 40 MG/1
40 TABLET, DELAYED RELEASE ORAL
Qty: 90 TABLET | Refills: 1 | Status: SHIPPED | OUTPATIENT
Start: 2021-11-11 | End: 2022-01-19

## 2021-11-11 NOTE — TELEPHONE ENCOUNTER
Collette Herder    Requesting a 90 supply of pantoprazole at a time instead. Please review and sign below pended order if appropriate.     Thank you    LOV 9/2/2021-had procedure 11/1/2021    pantoprazole 40 MG Oral Tab EC 30 tablet 3 9/2/2021    Sig:   Take 1

## 2021-11-11 NOTE — TELEPHONE ENCOUNTER
Current Outpatient Medications   Medication Sig Dispense Refill   • pantoprazole 40 MG Oral Tab EC Take 1 tablet (40 mg total) by mouth every morning before breakfast. 30 tablet 3     90 day supply request.

## 2021-11-16 ENCOUNTER — OFFICE VISIT (OUTPATIENT)
Dept: OBGYN CLINIC | Facility: CLINIC | Age: 61
End: 2021-11-16
Payer: COMMERCIAL

## 2021-11-16 VITALS
HEART RATE: 80 BPM | BODY MASS INDEX: 37 KG/M2 | DIASTOLIC BLOOD PRESSURE: 77 MMHG | SYSTOLIC BLOOD PRESSURE: 150 MMHG | WEIGHT: 214.19 LBS

## 2021-11-16 DIAGNOSIS — Z01.419 ENCOUNTER FOR GYNECOLOGICAL EXAMINATION WITHOUT ABNORMAL FINDING: Primary | ICD-10-CM

## 2021-11-16 PROBLEM — Z12.31 ENCOUNTER FOR SCREENING MAMMOGRAM FOR BREAST CANCER: Status: RESOLVED | Noted: 2019-04-25 | Resolved: 2021-11-16

## 2021-11-16 PROBLEM — Z12.39 BREAST CANCER SCREENING: Status: RESOLVED | Noted: 2017-06-16 | Resolved: 2021-11-16

## 2021-11-16 PROCEDURE — 3078F DIAST BP <80 MM HG: CPT | Performed by: OBSTETRICS & GYNECOLOGY

## 2021-11-16 PROCEDURE — 99396 PREV VISIT EST AGE 40-64: CPT | Performed by: OBSTETRICS & GYNECOLOGY

## 2021-11-16 PROCEDURE — 3077F SYST BP >= 140 MM HG: CPT | Performed by: OBSTETRICS & GYNECOLOGY

## 2021-11-16 NOTE — PROGRESS NOTES
Beatriz Montes is a 62year old female [id number] No LMP recorded. (Menstrual status: Menopause). Patient presents with:  Gyn Exam: ANNUAL EXAM -- New pt. stopped smoking x 2 weeks but had one yesterday. Recent colonoscopy w/ multiple polyps again. David Mcgraw     OBS HISTORY:  Family History   Problem Relation Age of Onset   • Heart Disorder Father         CAD   • Hypertension Father    • Diabetes Mother    • Stroke Mother    • Diabetes Brother    • Other (Other) Brother         hemrrhagic stroke   • Cancer Other nourished  Head/Face:  normocephalic  Neck/Thyroid: thyroid symmetric, no thyromegaly, no nodules, no adenopathy  Lymphatic: no abnormal supraclavicular or axillary adenopathy is noted  Breast:   normal without palpable masses, tenderness, asymmetry, nippl

## 2022-01-19 ENCOUNTER — LAB ENCOUNTER (OUTPATIENT)
Dept: LAB | Age: 62
End: 2022-01-19
Attending: INTERNAL MEDICINE
Payer: COMMERCIAL

## 2022-01-19 ENCOUNTER — OFFICE VISIT (OUTPATIENT)
Dept: INTERNAL MEDICINE CLINIC | Facility: CLINIC | Age: 62
End: 2022-01-19
Payer: COMMERCIAL

## 2022-01-19 VITALS
HEART RATE: 83 BPM | WEIGHT: 215.13 LBS | DIASTOLIC BLOOD PRESSURE: 68 MMHG | SYSTOLIC BLOOD PRESSURE: 120 MMHG | TEMPERATURE: 98 F | OXYGEN SATURATION: 98 % | BODY MASS INDEX: 36.73 KG/M2 | HEIGHT: 64 IN

## 2022-01-19 DIAGNOSIS — K92.1 HEMATOCHEZIA: ICD-10-CM

## 2022-01-19 DIAGNOSIS — I10 ESSENTIAL HYPERTENSION: ICD-10-CM

## 2022-01-19 DIAGNOSIS — E11.9 CONTROLLED TYPE 2 DIABETES MELLITUS WITHOUT COMPLICATION, WITHOUT LONG-TERM CURRENT USE OF INSULIN (HCC): ICD-10-CM

## 2022-01-19 DIAGNOSIS — E78.2 MIXED HYPERLIPIDEMIA: ICD-10-CM

## 2022-01-19 DIAGNOSIS — R19.5 DARK STOOLS: ICD-10-CM

## 2022-01-19 DIAGNOSIS — R60.0 BILATERAL LEG EDEMA: ICD-10-CM

## 2022-01-19 DIAGNOSIS — E11.9 CONTROLLED TYPE 2 DIABETES MELLITUS WITHOUT COMPLICATION, WITHOUT LONG-TERM CURRENT USE OF INSULIN (HCC): Primary | ICD-10-CM

## 2022-01-19 LAB
ALBUMIN SERPL-MCNC: 3.8 G/DL (ref 3.4–5)
ALBUMIN/GLOB SERPL: 1 {RATIO} (ref 1–2)
ALP LIVER SERPL-CCNC: 93 U/L
ALT SERPL-CCNC: 72 U/L
ANION GAP SERPL CALC-SCNC: 11 MMOL/L (ref 0–18)
AST SERPL-CCNC: 41 U/L (ref 15–37)
BASOPHILS # BLD AUTO: 0.04 X10(3) UL (ref 0–0.2)
BASOPHILS NFR BLD AUTO: 0.7 %
BILIRUB SERPL-MCNC: 0.4 MG/DL (ref 0.1–2)
BUN BLD-MCNC: 12 MG/DL (ref 7–18)
BUN/CREAT SERPL: 17.9 (ref 10–20)
CALCIUM BLD-MCNC: 9.6 MG/DL (ref 8.5–10.1)
CHLORIDE SERPL-SCNC: 106 MMOL/L (ref 98–112)
CHOLEST SERPL-MCNC: 194 MG/DL (ref ?–200)
CO2 SERPL-SCNC: 24 MMOL/L (ref 21–32)
CREAT BLD-MCNC: 0.67 MG/DL
CREAT UR-SCNC: 163 MG/DL
DEPRECATED RDW RBC AUTO: 44 FL (ref 35.1–46.3)
EOSINOPHIL # BLD AUTO: 0.16 X10(3) UL (ref 0–0.7)
EOSINOPHIL NFR BLD AUTO: 2.9 %
ERYTHROCYTE [DISTWIDTH] IN BLOOD BY AUTOMATED COUNT: 13.1 % (ref 11–15)
EST. AVERAGE GLUCOSE BLD GHB EST-MCNC: 146 MG/DL (ref 68–126)
FASTING PATIENT LIPID ANSWER: YES
FASTING STATUS PATIENT QL REPORTED: YES
GLOBULIN PLAS-MCNC: 3.7 G/DL (ref 2.8–4.4)
GLUCOSE BLD-MCNC: 114 MG/DL (ref 70–99)
HBA1C MFR BLD: 6.7 % (ref ?–5.7)
HCT VFR BLD AUTO: 41.4 %
HDLC SERPL-MCNC: 45 MG/DL (ref 40–59)
HGB BLD-MCNC: 13.4 G/DL
IMM GRANULOCYTES # BLD AUTO: 0.02 X10(3) UL (ref 0–1)
IMM GRANULOCYTES NFR BLD: 0.4 %
LDLC SERPL CALC-MCNC: 71 MG/DL (ref ?–100)
LYMPHOCYTES # BLD AUTO: 2.19 X10(3) UL (ref 1–4)
LYMPHOCYTES NFR BLD AUTO: 40.1 %
MCH RBC QN AUTO: 29.5 PG (ref 26–34)
MCHC RBC AUTO-ENTMCNC: 32.4 G/DL (ref 31–37)
MCV RBC AUTO: 91.2 FL
MICROALBUMIN UR-MCNC: 7.34 MG/DL
MICROALBUMIN/CREAT 24H UR-RTO: 45 UG/MG (ref ?–30)
MONOCYTES # BLD AUTO: 0.39 X10(3) UL (ref 0.1–1)
MONOCYTES NFR BLD AUTO: 7.1 %
NEUTROPHILS # BLD AUTO: 2.66 X10 (3) UL (ref 1.5–7.7)
NEUTROPHILS # BLD AUTO: 2.66 X10(3) UL (ref 1.5–7.7)
NEUTROPHILS NFR BLD AUTO: 48.8 %
NONHDLC SERPL-MCNC: 149 MG/DL (ref ?–130)
OSMOLALITY SERPL CALC.SUM OF ELEC: 293 MOSM/KG (ref 275–295)
PLATELET # BLD AUTO: 227 10(3)UL (ref 150–450)
POTASSIUM SERPL-SCNC: 4.2 MMOL/L (ref 3.5–5.1)
PROT SERPL-MCNC: 7.5 G/DL (ref 6.4–8.2)
RBC # BLD AUTO: 4.54 X10(6)UL
SODIUM SERPL-SCNC: 141 MMOL/L (ref 136–145)
TRIGL SERPL-MCNC: 505 MG/DL (ref 30–149)
VLDLC SERPL CALC-MCNC: 78 MG/DL (ref 0–30)
WBC # BLD AUTO: 5.5 X10(3) UL (ref 4–11)

## 2022-01-19 PROCEDURE — 83036 HEMOGLOBIN GLYCOSYLATED A1C: CPT

## 2022-01-19 PROCEDURE — 85025 COMPLETE CBC W/AUTO DIFF WBC: CPT

## 2022-01-19 PROCEDURE — 3044F HG A1C LEVEL LT 7.0%: CPT | Performed by: INTERNAL MEDICINE

## 2022-01-19 PROCEDURE — 3074F SYST BP LT 130 MM HG: CPT | Performed by: INTERNAL MEDICINE

## 2022-01-19 PROCEDURE — 82570 ASSAY OF URINE CREATININE: CPT

## 2022-01-19 PROCEDURE — 3078F DIAST BP <80 MM HG: CPT | Performed by: INTERNAL MEDICINE

## 2022-01-19 PROCEDURE — 90471 IMMUNIZATION ADMIN: CPT | Performed by: INTERNAL MEDICINE

## 2022-01-19 PROCEDURE — 80061 LIPID PANEL: CPT

## 2022-01-19 PROCEDURE — 82043 UR ALBUMIN QUANTITATIVE: CPT

## 2022-01-19 PROCEDURE — 3061F NEG MICROALBUMINURIA REV: CPT | Performed by: INTERNAL MEDICINE

## 2022-01-19 PROCEDURE — 90686 IIV4 VACC NO PRSV 0.5 ML IM: CPT | Performed by: INTERNAL MEDICINE

## 2022-01-19 PROCEDURE — 80053 COMPREHEN METABOLIC PANEL: CPT

## 2022-01-19 PROCEDURE — 3060F POS MICROALBUMINURIA REV: CPT | Performed by: INTERNAL MEDICINE

## 2022-01-19 PROCEDURE — 3008F BODY MASS INDEX DOCD: CPT | Performed by: INTERNAL MEDICINE

## 2022-01-19 PROCEDURE — 36415 COLL VENOUS BLD VENIPUNCTURE: CPT

## 2022-01-19 PROCEDURE — 99214 OFFICE O/P EST MOD 30 MIN: CPT | Performed by: INTERNAL MEDICINE

## 2022-01-19 RX ORDER — DIAZEPAM 10 MG/1
TABLET ORAL
COMMUNITY
Start: 2021-11-22

## 2022-01-19 RX ORDER — PANTOPRAZOLE SODIUM 40 MG/1
40 TABLET, DELAYED RELEASE ORAL
Qty: 90 TABLET | Refills: 1 | Status: SHIPPED
Start: 2022-01-19

## 2022-01-19 NOTE — PROGRESS NOTES
Subjective:     Patient ID: Sundeep Samson is a 62year old female. Diabetes  She presents for her follow-up diabetic visit. She has type 2 diabetes mellitus. Her disease course has been stable. There are no hypoglycemic associated symptoms.  There are n history of chronic renal disease or a hypertension causing med. Hyperlipidemia  This is a chronic problem. The current episode started more than 1 year ago. The problem is uncontrolled. Recent lipid tests were reviewed and are high.  Exacerbating diseases total) by mouth every morning before breakfast. (Patient not taking: Reported on 1/19/2022) 90 tablet 1     Allergies:  Penicillins             HIVES    Past Medical History:   Diagnosis Date   • Colon polyp 2021    repeat CLN in 2024, multiple polyps in 2 Tympanic membrane, ear canal and external ear normal.      Mouth/Throat:      Pharynx: No oropharyngeal exudate. Eyes:      General: No scleral icterus. Right eye: No discharge. Left eye: No discharge.       Conjunctiva/sclera: Conjunctivae panel; pt to ff low fat low chol diet; she still taking 10mg of crestor daily;     (I10) Essential hypertension  Plan: bp controlled with bp meds.  Cpm.     (R60.0) Bilateral leg edema  Plan: pt had seen cardiologist per pt and states had been treated for v

## 2022-01-23 ENCOUNTER — TELEPHONE (OUTPATIENT)
Dept: INTERNAL MEDICINE CLINIC | Facility: CLINIC | Age: 62
End: 2022-01-23

## 2022-01-23 DIAGNOSIS — R79.89 ELEVATED LFTS: Primary | ICD-10-CM

## 2022-01-28 ENCOUNTER — TELEPHONE (OUTPATIENT)
Dept: INTERNAL MEDICINE CLINIC | Facility: CLINIC | Age: 62
End: 2022-01-28

## 2022-01-28 ENCOUNTER — LAB ENCOUNTER (OUTPATIENT)
Dept: LAB | Age: 62
End: 2022-01-28
Attending: INTERNAL MEDICINE
Payer: COMMERCIAL

## 2022-01-28 DIAGNOSIS — R79.89 ELEVATED LFTS: ICD-10-CM

## 2022-01-28 LAB
ALBUMIN SERPL-MCNC: 3.7 G/DL (ref 3.4–5)
ALP LIVER SERPL-CCNC: 105 U/L
ALT SERPL-CCNC: 51 U/L
AST SERPL-CCNC: 40 U/L (ref 15–37)
BILIRUB DIRECT SERPL-MCNC: <0.1 MG/DL (ref 0–0.2)
BILIRUB SERPL-MCNC: 0.2 MG/DL (ref 0.1–2)
HAV AB SER QL IA: REACTIVE
HAV IGM SER QL: NONREACTIVE
HBV SURFACE AB SER QL: NONREACTIVE
HBV SURFACE AB SERPL IA-ACNC: <3.1 MIU/ML
HBV SURFACE AG SERPL QL IA: NONREACTIVE
HCV AB SERPL QL IA: NONREACTIVE
PROT SERPL-MCNC: 7.3 G/DL (ref 6.4–8.2)

## 2022-01-28 PROCEDURE — 86709 HEPATITIS A IGM ANTIBODY: CPT

## 2022-01-28 PROCEDURE — 86706 HEP B SURFACE ANTIBODY: CPT

## 2022-01-28 PROCEDURE — 80076 HEPATIC FUNCTION PANEL: CPT

## 2022-01-28 PROCEDURE — 36415 COLL VENOUS BLD VENIPUNCTURE: CPT

## 2022-01-28 PROCEDURE — 86803 HEPATITIS C AB TEST: CPT

## 2022-01-28 PROCEDURE — 86708 HEPATITIS A ANTIBODY: CPT

## 2022-01-28 PROCEDURE — 86704 HEP B CORE ANTIBODY TOTAL: CPT

## 2022-01-28 PROCEDURE — 80503 PATH CLIN CONSLTJ SF 5-20: CPT

## 2022-01-28 PROCEDURE — 87340 HEPATITIS B SURFACE AG IA: CPT

## 2022-01-28 RX ORDER — LOSARTAN POTASSIUM 50 MG/1
50 TABLET ORAL DAILY
Qty: 90 TABLET | Refills: 1 | Status: SHIPPED | OUTPATIENT
Start: 2022-01-28 | End: 2022-08-22

## 2022-01-28 RX ORDER — HYDROCHLOROTHIAZIDE 12.5 MG/1
12.5 CAPSULE, GELATIN COATED ORAL DAILY
Qty: 90 CAPSULE | Refills: 1 | Status: SHIPPED | OUTPATIENT
Start: 2022-01-28 | End: 2022-08-22

## 2022-01-28 RX ORDER — METFORMIN HYDROCHLORIDE 500 MG/1
1000 TABLET, EXTENDED RELEASE ORAL DAILY
Qty: 180 TABLET | Refills: 1 | Status: SHIPPED | OUTPATIENT
Start: 2022-01-28 | End: 2022-08-22

## 2022-01-28 RX ORDER — ROSUVASTATIN CALCIUM 10 MG/1
10 TABLET, COATED ORAL NIGHTLY
Qty: 90 TABLET | Refills: 1 | Status: SHIPPED | OUTPATIENT
Start: 2022-01-28 | End: 2022-08-22

## 2022-01-28 NOTE — TELEPHONE ENCOUNTER
Patient walk-in with questions about her refills not sent and stating someone call her about her test results.  I s/w Dr. Rodri Galarza, with his consent I related his lab results message, and that she needs to do more lab work, that the order is in the system

## 2022-01-30 ENCOUNTER — TELEPHONE (OUTPATIENT)
Dept: INTERNAL MEDICINE CLINIC | Facility: CLINIC | Age: 62
End: 2022-01-30

## 2022-01-30 DIAGNOSIS — R79.89 ELEVATED LFTS: Primary | ICD-10-CM

## 2022-02-09 ENCOUNTER — OFFICE VISIT (OUTPATIENT)
Dept: OPHTHALMOLOGY | Facility: CLINIC | Age: 62
End: 2022-02-09
Payer: COMMERCIAL

## 2022-02-09 ENCOUNTER — TELEPHONE (OUTPATIENT)
Dept: GASTROENTEROLOGY | Facility: CLINIC | Age: 62
End: 2022-02-09

## 2022-02-09 DIAGNOSIS — E11.9 DIABETES MELLITUS TYPE 2 WITHOUT RETINOPATHY (HCC): Primary | ICD-10-CM

## 2022-02-09 DIAGNOSIS — H25.13 AGE-RELATED NUCLEAR CATARACT OF BOTH EYES: ICD-10-CM

## 2022-02-09 PROBLEM — H40.003 GLAUCOMA SUSPECT, BILATERAL: Status: RESOLVED | Noted: 2019-10-11 | Resolved: 2022-02-09

## 2022-02-09 PROCEDURE — 2023F DILAT RTA XM W/O RTNOPTHY: CPT | Performed by: OPHTHALMOLOGY

## 2022-02-09 PROCEDURE — 92014 COMPRE OPH EXAM EST PT 1/>: CPT | Performed by: OPHTHALMOLOGY

## 2022-02-09 NOTE — ASSESSMENT & PLAN NOTE
Discussed mild cataracts in both eyes that are not affecting vision and are not surgical at this time. Continue with over the counter reading glasses.

## 2022-02-09 NOTE — PATIENT INSTRUCTIONS
Age-related nuclear cataract of both eyes  Discussed mild cataracts in both eyes that are not affecting vision and are not surgical at this time. Continue with over the counter reading glasses. Diabetes mellitus type 2 without retinopathy ([address]  Diabetes type II: no background of retinopathy, no signs of neovascularization noted. Discussed ocular and systemic benefits of blood sugar control. Diagnosis and treatment discussed in detail with patient.

## 2022-02-21 NOTE — TELEPHONE ENCOUNTER
Medication PA Requested:pantoprazole 40 MG Oral Tab EC                                                          CoverMyMeds Used:  Key:  Quantity:  Day Supply:  SigTake 1 tablet (40 mg total) by mouth every morning before breakfast.:   DX Code:                                     K21.9 GERD, K44.9 Hiatal Hernia    Called [facility], Spoke with [facility]. Performed PA on phone. Approved from 2/21/2022-2/21/2023, 90 for 90 days. auth #=ID #.    Carlos Richey [phone], spoke with Masood Neri, went through.      My chart message sent to patient of approval.

## 2022-08-22 ENCOUNTER — LAB ENCOUNTER (OUTPATIENT)
Dept: LAB | Age: 62
End: 2022-08-22
Attending: INTERNAL MEDICINE
Payer: COMMERCIAL

## 2022-08-22 ENCOUNTER — OFFICE VISIT (OUTPATIENT)
Dept: INTERNAL MEDICINE CLINIC | Facility: CLINIC | Age: 62
End: 2022-08-22
Payer: COMMERCIAL

## 2022-08-22 VITALS
DIASTOLIC BLOOD PRESSURE: 73 MMHG | SYSTOLIC BLOOD PRESSURE: 127 MMHG | HEART RATE: 75 BPM | WEIGHT: 219 LBS | HEIGHT: 66 IN | BODY MASS INDEX: 35.2 KG/M2

## 2022-08-22 DIAGNOSIS — E11.9 CONTROLLED TYPE 2 DIABETES MELLITUS WITHOUT COMPLICATION, WITHOUT LONG-TERM CURRENT USE OF INSULIN (HCC): ICD-10-CM

## 2022-08-22 DIAGNOSIS — E78.2 MIXED HYPERLIPIDEMIA: ICD-10-CM

## 2022-08-22 DIAGNOSIS — R79.89 ELEVATED LFTS: ICD-10-CM

## 2022-08-22 DIAGNOSIS — R60.0 BILATERAL LEG EDEMA: ICD-10-CM

## 2022-08-22 DIAGNOSIS — E11.9 CONTROLLED TYPE 2 DIABETES MELLITUS WITHOUT COMPLICATION, WITHOUT LONG-TERM CURRENT USE OF INSULIN (HCC): Primary | ICD-10-CM

## 2022-08-22 DIAGNOSIS — I10 ESSENTIAL HYPERTENSION: ICD-10-CM

## 2022-08-22 DIAGNOSIS — Z12.31 ENCOUNTER FOR SCREENING MAMMOGRAM FOR BREAST CANCER: ICD-10-CM

## 2022-08-22 LAB
ALBUMIN SERPL-MCNC: 3.6 G/DL (ref 3.4–5)
ALBUMIN/GLOB SERPL: 0.9 {RATIO} (ref 1–2)
ALP LIVER SERPL-CCNC: 99 U/L
ALT SERPL-CCNC: 88 U/L
ANION GAP SERPL CALC-SCNC: 7 MMOL/L (ref 0–18)
AST SERPL-CCNC: 72 U/L (ref 15–37)
BILIRUB SERPL-MCNC: 0.4 MG/DL (ref 0.1–2)
BUN BLD-MCNC: 15 MG/DL (ref 7–18)
BUN/CREAT SERPL: 19.7 (ref 10–20)
CALCIUM BLD-MCNC: 9 MG/DL (ref 8.5–10.1)
CARTRIDGE LOT#: 987 NUMERIC
CHLORIDE SERPL-SCNC: 109 MMOL/L (ref 98–112)
CHOLEST SERPL-MCNC: 140 MG/DL (ref ?–200)
CO2 SERPL-SCNC: 24 MMOL/L (ref 21–32)
CREAT BLD-MCNC: 0.76 MG/DL
CREAT UR-SCNC: 67.9 MG/DL
DEPRECATED HBV CORE AB SER IA-ACNC: 103.7 NG/ML
FASTING PATIENT LIPID ANSWER: YES
FASTING STATUS PATIENT QL REPORTED: YES
GFR SERPLBLD BASED ON 1.73 SQ M-ARVRAT: 89 ML/MIN/1.73M2 (ref 60–?)
GLOBULIN PLAS-MCNC: 4 G/DL (ref 2.8–4.4)
GLUCOSE BLD-MCNC: 144 MG/DL (ref 70–99)
HDLC SERPL-MCNC: 54 MG/DL (ref 40–59)
HEMOGLOBIN A1C: 7 % (ref 4.3–5.6)
IRON SATN MFR SERPL: 34 %
IRON SERPL-MCNC: 132 UG/DL
LDLC SERPL CALC-MCNC: 43 MG/DL (ref ?–100)
MICROALBUMIN UR-MCNC: 0.54 MG/DL
MICROALBUMIN/CREAT 24H UR-RTO: 8 UG/MG (ref ?–30)
NONHDLC SERPL-MCNC: 86 MG/DL (ref ?–130)
OSMOLALITY SERPL CALC.SUM OF ELEC: 293 MOSM/KG (ref 275–295)
POTASSIUM SERPL-SCNC: 4 MMOL/L (ref 3.5–5.1)
PROT SERPL-MCNC: 7.6 G/DL (ref 6.4–8.2)
SODIUM SERPL-SCNC: 140 MMOL/L (ref 136–145)
TIBC SERPL-MCNC: 393 UG/DL (ref 240–450)
TRANSFERRIN SERPL-MCNC: 264 MG/DL (ref 200–360)
TRIGL SERPL-MCNC: 285 MG/DL (ref 30–149)
VLDLC SERPL CALC-MCNC: 40 MG/DL (ref 0–30)

## 2022-08-22 PROCEDURE — 3078F DIAST BP <80 MM HG: CPT | Performed by: INTERNAL MEDICINE

## 2022-08-22 PROCEDURE — 3074F SYST BP LT 130 MM HG: CPT | Performed by: INTERNAL MEDICINE

## 2022-08-22 PROCEDURE — 82043 UR ALBUMIN QUANTITATIVE: CPT

## 2022-08-22 PROCEDURE — 84466 ASSAY OF TRANSFERRIN: CPT

## 2022-08-22 PROCEDURE — 3008F BODY MASS INDEX DOCD: CPT | Performed by: INTERNAL MEDICINE

## 2022-08-22 PROCEDURE — 36415 COLL VENOUS BLD VENIPUNCTURE: CPT

## 2022-08-22 PROCEDURE — 90471 IMMUNIZATION ADMIN: CPT | Performed by: INTERNAL MEDICINE

## 2022-08-22 PROCEDURE — 3051F HG A1C>EQUAL 7.0%<8.0%: CPT | Performed by: INTERNAL MEDICINE

## 2022-08-22 PROCEDURE — 90677 PCV20 VACCINE IM: CPT | Performed by: INTERNAL MEDICINE

## 2022-08-22 PROCEDURE — 82390 ASSAY OF CERULOPLASMIN: CPT

## 2022-08-22 PROCEDURE — 83540 ASSAY OF IRON: CPT

## 2022-08-22 PROCEDURE — 86381 MITOCHONDRIAL ANTIBODY EACH: CPT

## 2022-08-22 PROCEDURE — 86364 TISS TRNSGLTMNASE EA IG CLAS: CPT

## 2022-08-22 PROCEDURE — 82728 ASSAY OF FERRITIN: CPT

## 2022-08-22 PROCEDURE — 82570 ASSAY OF URINE CREATININE: CPT

## 2022-08-22 PROCEDURE — 80061 LIPID PANEL: CPT

## 2022-08-22 PROCEDURE — 3061F NEG MICROALBUMINURIA REV: CPT | Performed by: INTERNAL MEDICINE

## 2022-08-22 PROCEDURE — 83036 HEMOGLOBIN GLYCOSYLATED A1C: CPT | Performed by: INTERNAL MEDICINE

## 2022-08-22 PROCEDURE — 82103 ALPHA-1-ANTITRYPSIN TOTAL: CPT

## 2022-08-22 PROCEDURE — 99214 OFFICE O/P EST MOD 30 MIN: CPT | Performed by: INTERNAL MEDICINE

## 2022-08-22 PROCEDURE — 86256 FLUORESCENT ANTIBODY TITER: CPT

## 2022-08-22 PROCEDURE — 80053 COMPREHEN METABOLIC PANEL: CPT

## 2022-08-22 RX ORDER — METFORMIN HYDROCHLORIDE 500 MG/1
1500 TABLET, EXTENDED RELEASE ORAL DAILY
Qty: 270 TABLET | Refills: 1 | Status: SHIPPED | OUTPATIENT
Start: 2022-08-22

## 2022-08-22 RX ORDER — LOSARTAN POTASSIUM 50 MG/1
50 TABLET ORAL DAILY
Qty: 90 TABLET | Refills: 1 | Status: SHIPPED | OUTPATIENT
Start: 2022-08-22

## 2022-08-22 RX ORDER — ROSUVASTATIN CALCIUM 10 MG/1
10 TABLET, COATED ORAL NIGHTLY
Qty: 90 TABLET | Refills: 1 | Status: SHIPPED | OUTPATIENT
Start: 2022-08-22

## 2022-08-22 RX ORDER — HYDROCHLOROTHIAZIDE 12.5 MG/1
12.5 CAPSULE, GELATIN COATED ORAL DAILY
Qty: 90 CAPSULE | Refills: 1 | Status: SHIPPED | OUTPATIENT
Start: 2022-08-22

## 2022-08-23 LAB
A1AT SERPL-MCNC: 98 MG/DL (ref 90–200)
CERULOPLASMIN SERPL-MCNC: 24.1 MG/DL (ref 20–60)
MITOCHONDRIA AB TITR SER: <20 {TITER}
SMOOTH MUSCLE AB TITR SER: <20 {TITER}
TTG IGA SER-ACNC: 0.3 U/ML (ref ?–7)

## 2022-08-26 ENCOUNTER — TELEPHONE (OUTPATIENT)
Dept: INTERNAL MEDICINE CLINIC | Facility: CLINIC | Age: 62
End: 2022-08-26

## 2022-08-26 DIAGNOSIS — E66.9 OBESITY WITHOUT SERIOUS COMORBIDITY, UNSPECIFIED CLASSIFICATION, UNSPECIFIED OBESITY TYPE: Primary | ICD-10-CM

## 2022-08-26 NOTE — TELEPHONE ENCOUNTER
Patient was notified with understanding. She will call for US appt. She agrees to bariatric referral.  Dr. Ashlie Lynn phone number provided. Pended for signature.

## 2022-08-26 NOTE — TELEPHONE ENCOUNTER
----- Message from Jeff Lott RN sent at 8/26/2022  8:15 AM CDT -----      ----- Message -----  From: Albertina Alba RN  Sent: 8/25/2022   1:45 PM CDT  To: Em Rn Triage      ----- Message -----  From: Deidra Harry RN  Sent: 8/25/2022  11:06 AM CDT  To: Carlos Canada RN        ----- Message -----  From: Danica Mccabe MD  Sent: 8/25/2022   8:14 AM CDT  To: Em Rn Triage    Pls notify pt  His labs showed her cholesterol levels were good. Triglyceride still elevated by improving down by 50% from before . Continue chol med and diet. Her urine test normal  Her labs for workup for her elevated liver enzymes were all negative. Her chem panel still showed mild elevation of her liver enzymes. I had ordered liver ultrasound for her earlier this year and she will need to do this. I suspect she has fatty liver due to her weight since all tests done had been negative. So would want her to work on losing weight. We can also offer her to see our bariatric clinic in [address] [place] [address] to help her lose weight .

## 2022-10-03 ENCOUNTER — HOSPITAL ENCOUNTER (OUTPATIENT)
Dept: GENERAL RADIOLOGY | Age: 62
Discharge: HOME | End: 2022-10-03
Attending: INTERNAL MEDICINE
Payer: COMMERCIAL

## 2022-10-03 ENCOUNTER — OFFICE VISIT (OUTPATIENT)
Dept: INTERNAL MEDICINE CLINIC | Facility: CLINIC | Age: 62
End: 2022-10-03
Payer: COMMERCIAL

## 2022-10-03 ENCOUNTER — LAB ENCOUNTER (OUTPATIENT)
Dept: LAB | Age: 62
End: 2022-10-03
Attending: INTERNAL MEDICINE
Payer: COMMERCIAL

## 2022-10-03 VITALS
DIASTOLIC BLOOD PRESSURE: 72 MMHG | HEIGHT: 66 IN | HEART RATE: 73 BPM | BODY MASS INDEX: 36.42 KG/M2 | WEIGHT: 226.63 LBS | TEMPERATURE: 99 F | OXYGEN SATURATION: 98 % | SYSTOLIC BLOOD PRESSURE: 126 MMHG

## 2022-10-03 DIAGNOSIS — M79.671 RIGHT FOOT PAIN: ICD-10-CM

## 2022-10-03 DIAGNOSIS — M79.671 RIGHT FOOT PAIN: Primary | ICD-10-CM

## 2022-10-03 LAB — URATE SERPL-MCNC: 10.4 MG/DL

## 2022-10-03 PROCEDURE — 84550 ASSAY OF BLOOD/URIC ACID: CPT

## 2022-10-03 PROCEDURE — 73630 X-RAY EXAM OF FOOT: CPT | Performed by: INTERNAL MEDICINE

## 2022-10-03 PROCEDURE — 3008F BODY MASS INDEX DOCD: CPT | Performed by: INTERNAL MEDICINE

## 2022-10-03 PROCEDURE — 99213 OFFICE O/P EST LOW 20 MIN: CPT | Performed by: INTERNAL MEDICINE

## 2022-10-03 PROCEDURE — 3074F SYST BP LT 130 MM HG: CPT | Performed by: INTERNAL MEDICINE

## 2022-10-03 PROCEDURE — 73610 X-RAY EXAM OF ANKLE: CPT | Performed by: INTERNAL MEDICINE

## 2022-10-03 PROCEDURE — 3078F DIAST BP <80 MM HG: CPT | Performed by: INTERNAL MEDICINE

## 2022-10-03 PROCEDURE — 36415 COLL VENOUS BLD VENIPUNCTURE: CPT

## 2022-10-03 RX ORDER — MELOXICAM 15 MG/1
15 TABLET ORAL DAILY
Qty: 10 TABLET | Refills: 0 | Status: SHIPPED | OUTPATIENT
Start: 2022-10-03

## 2022-10-04 DIAGNOSIS — M77.32 HEEL SPUR, LEFT: Primary | ICD-10-CM

## 2022-10-04 DIAGNOSIS — M77.31 HEEL SPUR, RIGHT: ICD-10-CM

## 2022-10-10 ENCOUNTER — TELEPHONE (OUTPATIENT)
Dept: INTERNAL MEDICINE CLINIC | Facility: CLINIC | Age: 62
End: 2022-10-10

## 2022-10-10 DIAGNOSIS — M79.671 RIGHT FOOT PAIN: Primary | ICD-10-CM

## 2022-10-31 ENCOUNTER — HOSPITAL ENCOUNTER (OUTPATIENT)
Dept: GENERAL RADIOLOGY | Age: 62
Discharge: HOME | End: 2022-10-31
Attending: PODIATRIST
Payer: COMMERCIAL

## 2022-10-31 ENCOUNTER — HOSPITAL ENCOUNTER (OUTPATIENT)
Dept: ULTRASOUND IMAGING | Facility: HOSPITAL | Age: 62
Discharge: HOME | End: 2022-10-31
Attending: PODIATRIST
Payer: COMMERCIAL

## 2022-10-31 ENCOUNTER — OFFICE VISIT (OUTPATIENT)
Dept: PODIATRY CLINIC | Facility: CLINIC | Age: 62
End: 2022-10-31
Payer: COMMERCIAL

## 2022-10-31 DIAGNOSIS — E11.9 DIABETES MELLITUS TYPE 2 WITHOUT RETINOPATHY (HCC): ICD-10-CM

## 2022-10-31 DIAGNOSIS — M25.572 LEFT ANKLE PAIN: ICD-10-CM

## 2022-10-31 DIAGNOSIS — M25.572 PAIN IN LEFT ANKLE AND JOINTS OF LEFT FOOT: ICD-10-CM

## 2022-10-31 DIAGNOSIS — M10.072 IDIOPATHIC GOUT, LEFT ANKLE AND FOOT: ICD-10-CM

## 2022-10-31 DIAGNOSIS — R60.0 LEG EDEMA: ICD-10-CM

## 2022-10-31 DIAGNOSIS — E79.0 HYPERURICEMIA: ICD-10-CM

## 2022-10-31 DIAGNOSIS — R60.0 LEG EDEMA, LEFT: Primary | ICD-10-CM

## 2022-10-31 DIAGNOSIS — R60.0 LEG EDEMA, LEFT: ICD-10-CM

## 2022-10-31 PROCEDURE — 99204 OFFICE O/P NEW MOD 45 MIN: CPT | Performed by: PODIATRIST

## 2022-10-31 PROCEDURE — L4387 NON-PNEUM WALK BOOT PRE OTS: HCPCS | Performed by: PODIATRIST

## 2022-10-31 PROCEDURE — 93971 EXTREMITY STUDY: CPT | Performed by: PODIATRIST

## 2022-10-31 PROCEDURE — E0114 CRUTCH UNDERARM PAIR NO WOOD: HCPCS | Performed by: PODIATRIST

## 2022-10-31 PROCEDURE — 73610 X-RAY EXAM OF ANKLE: CPT | Performed by: PODIATRIST

## 2022-10-31 PROCEDURE — 73630 X-RAY EXAM OF FOOT: CPT | Performed by: PODIATRIST

## 2022-10-31 RX ORDER — METHYLPREDNISOLONE 4 MG/1
TABLET ORAL
Qty: 1 EACH | Refills: 0 | Status: SHIPPED | OUTPATIENT
Start: 2022-10-31

## 2022-11-14 ENCOUNTER — OFFICE VISIT (OUTPATIENT)
Dept: PODIATRY CLINIC | Facility: CLINIC | Age: 62
End: 2022-11-14
Payer: COMMERCIAL

## 2022-11-14 DIAGNOSIS — M25.572 PAIN IN LEFT ANKLE AND JOINTS OF LEFT FOOT: ICD-10-CM

## 2022-11-14 DIAGNOSIS — E11.9 DIABETES MELLITUS TYPE 2 WITHOUT RETINOPATHY (HCC): ICD-10-CM

## 2022-11-14 DIAGNOSIS — R60.0 LEG EDEMA, LEFT: Primary | ICD-10-CM

## 2022-11-14 DIAGNOSIS — E79.0 HYPERURICEMIA: ICD-10-CM

## 2022-11-14 DIAGNOSIS — M10.072 IDIOPATHIC GOUT, LEFT ANKLE AND FOOT: ICD-10-CM

## 2022-11-14 PROCEDURE — 99212 OFFICE O/P EST SF 10 MIN: CPT | Performed by: PODIATRIST

## 2022-11-17 RX ORDER — PANTOPRAZOLE SODIUM 40 MG/1
TABLET, DELAYED RELEASE ORAL
Qty: 90 TABLET | Refills: 0 | Status: SHIPPED | OUTPATIENT
Start: 2022-11-17

## 2022-11-29 ENCOUNTER — OFFICE VISIT (OUTPATIENT)
Dept: INTERNAL MEDICINE CLINIC | Facility: CLINIC | Age: 62
End: 2022-11-29
Payer: COMMERCIAL

## 2022-11-29 VITALS
TEMPERATURE: 98 F | DIASTOLIC BLOOD PRESSURE: 70 MMHG | OXYGEN SATURATION: 97 % | BODY MASS INDEX: 37.47 KG/M2 | HEART RATE: 80 BPM | SYSTOLIC BLOOD PRESSURE: 124 MMHG | HEIGHT: 66 IN | WEIGHT: 233.13 LBS

## 2022-11-29 DIAGNOSIS — M10.072 ACUTE IDIOPATHIC GOUT OF LEFT FOOT: Primary | ICD-10-CM

## 2022-11-29 DIAGNOSIS — E11.9 CONTROLLED TYPE 2 DIABETES MELLITUS WITHOUT COMPLICATION, WITHOUT LONG-TERM CURRENT USE OF INSULIN (HCC): ICD-10-CM

## 2022-11-29 DIAGNOSIS — R79.89 ELEVATED LFTS: ICD-10-CM

## 2022-11-29 LAB
CARTRIDGE LOT#: ABNORMAL NUMERIC
HEMOGLOBIN A1C: 8.5 % (ref 4.3–5.6)

## 2022-11-29 PROCEDURE — 99213 OFFICE O/P EST LOW 20 MIN: CPT | Performed by: INTERNAL MEDICINE

## 2022-11-29 PROCEDURE — 3008F BODY MASS INDEX DOCD: CPT | Performed by: INTERNAL MEDICINE

## 2022-11-29 PROCEDURE — 3074F SYST BP LT 130 MM HG: CPT | Performed by: INTERNAL MEDICINE

## 2022-11-29 PROCEDURE — 3052F HG A1C>EQUAL 8.0%<EQUAL 9.0%: CPT | Performed by: INTERNAL MEDICINE

## 2022-11-29 PROCEDURE — 83036 HEMOGLOBIN GLYCOSYLATED A1C: CPT | Performed by: INTERNAL MEDICINE

## 2022-11-29 PROCEDURE — 90471 IMMUNIZATION ADMIN: CPT | Performed by: INTERNAL MEDICINE

## 2022-11-29 PROCEDURE — 3078F DIAST BP <80 MM HG: CPT | Performed by: INTERNAL MEDICINE

## 2022-11-29 PROCEDURE — 90686 IIV4 VACC NO PRSV 0.5 ML IM: CPT | Performed by: INTERNAL MEDICINE

## 2022-12-23 ENCOUNTER — HOSPITAL ENCOUNTER (OUTPATIENT)
Dept: ULTRASOUND IMAGING | Facility: HOSPITAL | Age: 62
Discharge: HOME | End: 2022-12-23
Attending: INTERNAL MEDICINE
Payer: COMMERCIAL

## 2022-12-23 DIAGNOSIS — R79.89 ELEVATED LFTS: ICD-10-CM

## 2022-12-23 PROCEDURE — 76705 ECHO EXAM OF ABDOMEN: CPT | Performed by: INTERNAL MEDICINE

## 2023-01-18 ENCOUNTER — OFFICE VISIT (OUTPATIENT)
Dept: RHEUMATOLOGY | Facility: CLINIC | Age: 63
End: 2023-01-18

## 2023-01-18 VITALS
DIASTOLIC BLOOD PRESSURE: 80 MMHG | BODY MASS INDEX: 37.12 KG/M2 | HEIGHT: 66 IN | HEART RATE: 61 BPM | WEIGHT: 231 LBS | SYSTOLIC BLOOD PRESSURE: 186 MMHG

## 2023-01-18 DIAGNOSIS — M25.472 LEFT ANKLE SWELLING: Primary | ICD-10-CM

## 2023-01-18 DIAGNOSIS — M10.072 ACUTE IDIOPATHIC GOUT OF LEFT FOOT: ICD-10-CM

## 2023-01-18 PROCEDURE — 3079F DIAST BP 80-89 MM HG: CPT | Performed by: INTERNAL MEDICINE

## 2023-01-18 PROCEDURE — 99244 OFF/OP CNSLTJ NEW/EST MOD 40: CPT | Performed by: INTERNAL MEDICINE

## 2023-01-18 PROCEDURE — 3077F SYST BP >= 140 MM HG: CPT | Performed by: INTERNAL MEDICINE

## 2023-01-18 PROCEDURE — 3008F BODY MASS INDEX DOCD: CPT | Performed by: INTERNAL MEDICINE

## 2023-01-18 RX ORDER — ALLOPURINOL 100 MG/1
50 TABLET ORAL DAILY
Qty: 90 TABLET | Refills: 0 | Status: SHIPPED | OUTPATIENT
Start: 2023-01-18

## 2023-01-18 RX ORDER — PREDNISONE 10 MG/1
TABLET ORAL
Qty: 40 TABLET | Refills: 0 | Status: SHIPPED | OUTPATIENT
Start: 2023-01-18

## 2023-01-18 NOTE — TELEPHONE ENCOUNTER
Please review. Protocol failed/ No protocol      Requested Prescriptions   Pending Prescriptions Disp Refills    metFORMIN  MG Oral Tablet 24 Hr 270 tablet 1     Sig: Take 3 tablets (1,500 mg total) by mouth daily. Diabetes Medication Protocol Failed - 1/18/2023 11:24 AM        Failed - Last A1C < 7.5 and within past 6 months     Lab Results   Component Value Date    A1C 8.5 (A) 11/29/2022             Passed - In person appointment or virtual visit in the past 6 mos or appointment in next 3 mos     Recent Outpatient Visits              Today Left ankle swelling    [facility], [address], [facility], Josué Sow MD    Office Visit    1 month ago Acute idiopathic gout of left foot    [facility], [address], Bethanie Cabot, MD    Office Visit    2 months ago Leg edema, left    [facility], [address], Pura [place], [place]    Office Visit    2 months ago Leg edema, left    [facility], [facility] [address], Adalberto Guajardo, [place]    Office Visit    3 months ago Right foot pain    [facility], Bethanie Cabot, MD    Office Visit          Future Appointments         Provider Department Appt Notes    In 3 weeks Daniel Bynum MD [facility], [address], [place] 4 wk f/u               Passed - EGFRCR or GFRAA > 50     GFR Evaluation  EGFRCR: 89 , resulted on 8/22/2022          Passed - GFR in the past 12 months          losartan 50 MG Oral Tab 90 tablet 1     Sig: Take 1 tablet (50 mg total) by mouth daily.        Hypertensive Medications Protocol Failed - 1/18/2023 11:24 AM        Failed - Last BP reading less than 140/90     BP Readings from Last 1 Encounters:  01/18/23 : (!) 186/80              Passed - In person appointment in the past 12 or next 3 months     Recent Outpatient Visits              Today Left ankle swelling    [facility], [address] [facility], Keenan Esqueda MD    Office Visit    1 month ago Acute idiopathic gout of left foot    [facility], [facility] [address], Adalberto Simental MD    Office Visit    2 months ago Leg edema, left    [facility], [facility], Kacie [place], [place]    Office Visit    2 months ago Leg edema, left    [facility], [facility], Adalberto [place], [place]    Office Visit    3 months ago Right foot pain    [address], [facility], Sathya Lara MD    Office Visit          Future Appointments         Provider Department Appt Notes    In 3 weeks Jessica Núñez MD [facility], [address], [place] 4 wk f/u               Passed - CMP or BMP in past 6 months     Recent Results (from the past 4392 hour(s))   COMP METABOLIC PANEL (14)    Collection Time: 08/22/22  9:52 AM   Result Value Ref Range    Glucose 144 (H) 70 - 99 mg/dL    Sodium 140 136 - 145 mmol/L    Potassium 4.0 3.5 - 5.1 mmol/L    Chloride 109 98 - 112 mmol/L    CO2 24.0 21.0 - 32.0 mmol/L    Anion Gap 7 0 - 18 mmol/L    BUN 15 7 - 18 mg/dL    Creatinine 0.76 0.55 - 1.02 mg/dL    BUN/CREA Ratio 19.7 10.0 - 20.0    Calcium, Total 9.0 8.5 - 10.1 mg/dL    Calculated Osmolality 293 275 - 295 mOsm/kg    eGFR-Cr 89 >=60 mL/min/1.73m2    ALT 88 (H) 13 - 56 U/L    AST 72 (H) 15 - 37 U/L    Alkaline Phosphatase 99 50 - 130 U/L    Bilirubin, Total 0.4 0.1 - 2.0 mg/dL    Total Protein 7.6 6.4 - 8.2 g/dL    Albumin 3.6 3.4 - 5.0 g/dL    Globulin  4.0 2.8 - 4.4 g/dL    A/G Ratio 0.9 (L) 1.0 - 2.0    Patient Fasting for CMP? Yes      *Note: Due to a large number of results and/or encounters for the requested time period, some results have not been displayed. A complete set of results can be found in Results Review.                Passed - In person appointment or virtual visit in the past 6 months     Recent Outpatient Visits Today Left ankle swelling    [facility], [address] [facility],[address], Rigo Esqueda MD    Office Visit    1 month ago Acute idiopathic gout of left foot    [facility], [address], Karuna Alfred MD    Office Visit    2 months ago Leg edema, left    [facility], [facility], Keara [place], [place]    Office Visit    2 months ago Leg edema, left    [facility], [address], Adalberto Kapoor, [place]    Office Visit    3 months ago Right foot pain    [facility], Karuna Alrfed MD    Office Visit          Future Appointments         Provider Department Appt Notes    In 3 weeks Gabriela Mcdermott MD [facility], [address], [place] 4 wk f/u               Passed - EGFRCR or GFRAA > 50     GFR Evaluation  EGFRCR: 89 , resulted on 2022            rosuvastatin 10 MG Oral Tab 90 tablet 1     Sig: Take 1 tablet (10 mg total) by mouth nightly.        Cholesterol Medication Protocol Failed - 2023 11:24 AM        Failed - Last ALT < 80     Lab Results   Component Value Date    ALT 88 (H) 2022             Passed - ALT in past 12 months        Passed - LDL in past 12 months        Passed - Last LDL < 130     Lab Results   Component Value Date    LDL 43 2022             Passed - In person appointment or virtual visit in the past 12 mos or appointment in next 3 mos     Recent Outpatient Visits              Today Left ankle swelling    [address], [address] [facility],[address], Grayson Meza MD    Office Visit    1 month ago Acute idiopathic gout of left foot    [address], [address], Karuna Alfred MD    Office Visit    2 months ago Leg edema, left    [facility], [facility] [address], Adalberto [place], [place]    Office Visit    2 months ago Leg edema, left    [facility], [address], Danica [place]    Office Visit    3 months ago Right foot pain    [address] [facility] [address], Rachel Fofana MD    Office Visit          Future Appointments         Provider Department Appt Notes    In 3 weeks Viviana Haney MD [address], [address] 4 wk f/u                    Future Appointments         Provider Department Appt Notes    In 3 weeks Viviana Haney MD [address], [address] 4 wk f/u             Recent Outpatient Visits              Today Left ankle swelling    [facility], [address], Aniya Esqueda MD    Office Visit    1 month ago Acute idiopathic gout of left foot    [facility], [address], Rachel Fofana MD    Office Visit    2 months ago Leg edema, left    [facility], [address], Adalberto Juarez, CASEY Maloney    Office Visit    2 months ago Leg edema, left    [facility], [address], Peggy De    Office Visit    3 months ago Right foot pain    [address] [facility] [address], Rachel Fofana MD    Office Visit

## 2023-01-18 NOTE — PATIENT INSTRUCTIONS
You are seen today for gout  Start prednisone 40 mg daily x4 days then 30 mg daily x4 days then 20 mg daily x4 days then 10 mg daily x4 days then stop  Plan to start allopurinol 50 mg daily which is half a pill a day once your gout flare has improved. Once you start this medication you would stop it.   We will be starting in the next week  Again with instructions on gout diet  If your symptoms do improve may have to consider an MRI of your left foot/ankle  See me in the next 3 to 4 weeks

## 2023-01-18 NOTE — TELEPHONE ENCOUNTER
Medication pended. Routing for protocol.      ----- Message -----  From: Desean Kael  Sent: 1/18/2023   9:47 AM CST  To: Em Rn Triage  Subject: medication                                       Good morning, all my medications have been accidently thrown in the trash. Is there any way I can get a refill for my 3 meds. Metformin,  Losartan and Rosuvastatin. I didnt include HCTZ because Dr BECERRIL stopped me from taking those due to my gout.     Cherylene Dusky [phone]

## 2023-01-19 ENCOUNTER — PATIENT MESSAGE (OUTPATIENT)
Dept: INTERNAL MEDICINE CLINIC | Facility: CLINIC | Age: 63
End: 2023-01-19

## 2023-01-19 RX ORDER — ROSUVASTATIN CALCIUM 10 MG/1
10 TABLET, COATED ORAL NIGHTLY
Qty: 90 TABLET | Refills: 1 | Status: SHIPPED | OUTPATIENT
Start: 2023-01-19

## 2023-01-19 RX ORDER — METFORMIN HYDROCHLORIDE 500 MG/1
1500 TABLET, EXTENDED RELEASE ORAL DAILY
Qty: 270 TABLET | Refills: 1 | Status: SHIPPED | OUTPATIENT
Start: 2023-01-19

## 2023-01-19 RX ORDER — LOSARTAN POTASSIUM 50 MG/1
50 TABLET ORAL DAILY
Qty: 90 TABLET | Refills: 1 | Status: SHIPPED | OUTPATIENT
Start: 2023-01-19

## 2023-01-20 NOTE — TELEPHONE ENCOUNTER
Dr. Debbie Chen, please see message. Ok to resume hydrochlorothiazide? Rx pended. From: Mei Malone  To: Rosenda Abdul MD  Sent: 1/19/2023 11:18 PM CST  Subject: hydroCHLOROthiazide 12.5 MG Caps    Thank You for the quick response for the meds. After meeting with Dr Rosa Cervantes on Wed she feels I do need to resume the water pill to reduce my leg swelling. I apologize for the back and forth. Can the HCTZ be added to the refill at [facility].     Thank You, Maytral    Again thank you for the refills

## 2023-01-21 RX ORDER — HYDROCHLOROTHIAZIDE 12.5 MG/1
12.5 CAPSULE, GELATIN COATED ORAL DAILY
Qty: 90 CAPSULE | Refills: 1 | Status: SHIPPED | OUTPATIENT
Start: 2023-01-21

## 2023-02-03 ENCOUNTER — PATIENT MESSAGE (OUTPATIENT)
Dept: GASTROENTEROLOGY | Facility: CLINIC | Age: 63
End: 2023-02-03

## 2023-02-03 NOTE — TELEPHONE ENCOUNTER
From: Tawnya Godinez  To: Marisel Booth. ALVIN Yao  Sent: 2/3/2023 1:51 PM CST  Subject: Prior Approval Request    Hi, hope you're well, I have a letter from my insurance BC/BS for you to approve for me to continue receiving my stomach meds (pantoprazole). You can go to [email or web address] or [phone] ([id number] M-F)  Approval window closes 2-, your help is greatly appreciated.     Thank Martha Irene [phone]

## 2023-02-08 RX ORDER — PANTOPRAZOLE SODIUM 40 MG/1
40 TABLET, DELAYED RELEASE ORAL
Qty: 90 TABLET | Refills: 1 | Status: SHIPPED | OUTPATIENT
Start: 2023-02-08

## 2023-02-08 NOTE — TELEPHONE ENCOUNTER
[facility]. requesting refill for pantoprazole 40 mg /daily. Please review and sign pended orders if appropriate. LOV: 9/02/2021   LR: 11/17/2022   Future Appts: None     Oral Tab EC 90 tablet 0 11/17/2022     Sig: TAKE 1 TABLET BY MOUTH ONCE DAILY IN THE MORNING BEFORE BREAKFAST    Sent to pharmacy as: Pantoprazole Sodium 40 MG Oral Tablet Delayed Release (Protonix)    E-Prescribing Status: Receipt confirmed by pharmacy (11/17/2022 10:50 AM CST)      Please advise. Thank you!

## 2023-02-08 NOTE — TELEPHONE ENCOUNTER
Medication PA Requested: PANTOPRAZOLE 40 MG Oral Tab EC                                                         CoverMyMeds Used:  Key:  Quantity:90 tablet  Day Supply:90  Sig: TAKE 1 TABLET BY MOUTH ONCE DAILY IN THE MORNING BEFORE BREAKFAST  DX Code: K21.9 GERD, K44.9 Hiatal Hernia                                        Called [facility] [phone], spoke with Natalie, performed PA on phone and was approved from 1/9/2023-2/8/2024, Lorenzo Caro # [id number]    [facility], [phone], spoke with Rossy/venkatesh, she states no current prescription, cannot run test claim. GI staff please send new prescription to pharmacy and please notify patient once it goes through. Thank you.

## 2023-02-13 ENCOUNTER — LAB ENCOUNTER (OUTPATIENT)
Dept: LAB | Facility: HOSPITAL | Age: 63
End: 2023-02-13
Attending: INTERNAL MEDICINE
Payer: COMMERCIAL

## 2023-02-13 ENCOUNTER — OFFICE VISIT (OUTPATIENT)
Dept: RHEUMATOLOGY | Facility: CLINIC | Age: 63
End: 2023-02-13

## 2023-02-13 VITALS
DIASTOLIC BLOOD PRESSURE: 67 MMHG | BODY MASS INDEX: 37.12 KG/M2 | HEIGHT: 66 IN | SYSTOLIC BLOOD PRESSURE: 124 MMHG | WEIGHT: 231 LBS | HEART RATE: 81 BPM

## 2023-02-13 DIAGNOSIS — M10.072 ACUTE IDIOPATHIC GOUT OF LEFT FOOT: Primary | ICD-10-CM

## 2023-02-13 DIAGNOSIS — M17.11 PRIMARY OSTEOARTHRITIS OF RIGHT KNEE: ICD-10-CM

## 2023-02-13 DIAGNOSIS — M25.472 LEFT ANKLE SWELLING: ICD-10-CM

## 2023-02-13 DIAGNOSIS — M10.072 ACUTE IDIOPATHIC GOUT OF LEFT FOOT: ICD-10-CM

## 2023-02-13 LAB
ALBUMIN SERPL-MCNC: 3.3 G/DL (ref 3.4–5)
ALBUMIN/GLOB SERPL: 0.8 {RATIO} (ref 1–2)
ALP LIVER SERPL-CCNC: 126 U/L
ALT SERPL-CCNC: 29 U/L
ANION GAP SERPL CALC-SCNC: 6 MMOL/L (ref 0–18)
AST SERPL-CCNC: 15 U/L (ref 15–37)
BILIRUB SERPL-MCNC: 0.3 MG/DL (ref 0.1–2)
BUN BLD-MCNC: 11 MG/DL (ref 7–18)
BUN/CREAT SERPL: 12.5 (ref 10–20)
CALCIUM BLD-MCNC: 9.1 MG/DL (ref 8.5–10.1)
CHLORIDE SERPL-SCNC: 103 MMOL/L (ref 98–112)
CO2 SERPL-SCNC: 28 MMOL/L (ref 21–32)
CREAT BLD-MCNC: 0.88 MG/DL
FASTING STATUS PATIENT QL REPORTED: NO
GFR SERPLBLD BASED ON 1.73 SQ M-ARVRAT: 75 ML/MIN/1.73M2 (ref 60–?)
GLOBULIN PLAS-MCNC: 4.1 G/DL (ref 2.8–4.4)
GLUCOSE BLD-MCNC: 290 MG/DL (ref 70–99)
OSMOLALITY SERPL CALC.SUM OF ELEC: 294 MOSM/KG (ref 275–295)
POTASSIUM SERPL-SCNC: 3.6 MMOL/L (ref 3.5–5.1)
PROT SERPL-MCNC: 7.4 G/DL (ref 6.4–8.2)
SODIUM SERPL-SCNC: 137 MMOL/L (ref 136–145)
URATE SERPL-MCNC: 6.2 MG/DL

## 2023-02-13 PROCEDURE — 80053 COMPREHEN METABOLIC PANEL: CPT

## 2023-02-13 PROCEDURE — 84550 ASSAY OF BLOOD/URIC ACID: CPT

## 2023-02-13 PROCEDURE — 36415 COLL VENOUS BLD VENIPUNCTURE: CPT

## 2023-02-13 RX ORDER — TRIAMCINOLONE ACETONIDE 40 MG/ML
40 INJECTION, SUSPENSION INTRA-ARTICULAR; INTRAMUSCULAR ONCE
Status: COMPLETED | OUTPATIENT
Start: 2023-02-13 | End: 2023-02-13

## 2023-02-13 RX ADMIN — TRIAMCINOLONE ACETONIDE 40 MG: 40 INJECTION, SUSPENSION INTRA-ARTICULAR; INTRAMUSCULAR at 11:10:00

## 2023-02-13 NOTE — PATIENT INSTRUCTIONS
You were seen for gout and pain in your left ankle and foot  Symptoms have improved  Continue allopurinol 50 mg daily  Get blood work today  We injected your right knee with cortisone due to the pain and significant arthritis  See me in the next 2 months, April  If your symptoms are improving then can consider MRI of your left ankle

## 2023-04-12 ENCOUNTER — LAB ENCOUNTER (OUTPATIENT)
Dept: LAB | Facility: HOSPITAL | Age: 63
End: 2023-04-12
Attending: INTERNAL MEDICINE
Payer: COMMERCIAL

## 2023-04-12 ENCOUNTER — OFFICE VISIT (OUTPATIENT)
Dept: RHEUMATOLOGY | Facility: CLINIC | Age: 63
End: 2023-04-12

## 2023-04-12 VITALS
DIASTOLIC BLOOD PRESSURE: 73 MMHG | WEIGHT: 227 LBS | SYSTOLIC BLOOD PRESSURE: 148 MMHG | HEIGHT: 66 IN | BODY MASS INDEX: 36.48 KG/M2 | HEART RATE: 62 BPM

## 2023-04-12 DIAGNOSIS — M10.072 ACUTE IDIOPATHIC GOUT OF LEFT FOOT: ICD-10-CM

## 2023-04-12 DIAGNOSIS — Z51.81 MEDICATION MONITORING ENCOUNTER: ICD-10-CM

## 2023-04-12 DIAGNOSIS — M25.472 LEFT ANKLE SWELLING: ICD-10-CM

## 2023-04-12 DIAGNOSIS — M10.072 ACUTE IDIOPATHIC GOUT OF LEFT FOOT: Primary | ICD-10-CM

## 2023-04-12 LAB
ALBUMIN SERPL-MCNC: 3.7 G/DL (ref 3.4–5)
ALBUMIN/GLOB SERPL: 1.1 {RATIO} (ref 1–2)
ALP LIVER SERPL-CCNC: 94 U/L
ALT SERPL-CCNC: 23 U/L
ANION GAP SERPL CALC-SCNC: 8 MMOL/L (ref 0–18)
AST SERPL-CCNC: 14 U/L (ref 15–37)
BILIRUB SERPL-MCNC: 0.3 MG/DL (ref 0.1–2)
BUN BLD-MCNC: 11 MG/DL (ref 7–18)
BUN/CREAT SERPL: 14.7 (ref 10–20)
CALCIUM BLD-MCNC: 9.7 MG/DL (ref 8.5–10.1)
CHLORIDE SERPL-SCNC: 104 MMOL/L (ref 98–112)
CO2 SERPL-SCNC: 28 MMOL/L (ref 21–32)
CREAT BLD-MCNC: 0.75 MG/DL
FASTING STATUS PATIENT QL REPORTED: YES
GFR SERPLBLD BASED ON 1.73 SQ M-ARVRAT: 91 ML/MIN/1.73M2 (ref 60–?)
GLOBULIN PLAS-MCNC: 3.5 G/DL (ref 2.8–4.4)
GLUCOSE BLD-MCNC: 176 MG/DL (ref 70–99)
OSMOLALITY SERPL CALC.SUM OF ELEC: 294 MOSM/KG (ref 275–295)
POTASSIUM SERPL-SCNC: 3.9 MMOL/L (ref 3.5–5.1)
PROT SERPL-MCNC: 7.2 G/DL (ref 6.4–8.2)
SODIUM SERPL-SCNC: 140 MMOL/L (ref 136–145)
URATE SERPL-MCNC: 7.3 MG/DL

## 2023-04-12 PROCEDURE — 3077F SYST BP >= 140 MM HG: CPT | Performed by: INTERNAL MEDICINE

## 2023-04-12 PROCEDURE — 3078F DIAST BP <80 MM HG: CPT | Performed by: INTERNAL MEDICINE

## 2023-04-12 PROCEDURE — 80053 COMPREHEN METABOLIC PANEL: CPT

## 2023-04-12 PROCEDURE — 36415 COLL VENOUS BLD VENIPUNCTURE: CPT

## 2023-04-12 PROCEDURE — 84550 ASSAY OF BLOOD/URIC ACID: CPT

## 2023-04-12 PROCEDURE — 99214 OFFICE O/P EST MOD 30 MIN: CPT | Performed by: INTERNAL MEDICINE

## 2023-04-12 PROCEDURE — 3008F BODY MASS INDEX DOCD: CPT | Performed by: INTERNAL MEDICINE

## 2023-04-12 NOTE — PATIENT INSTRUCTIONS
You were seen for gout, symptoms are stable  Continue allopurinol 50 mg daily  Goal is to keep uric acid below 6  Blood work today  See me in 6 months

## 2023-06-27 ENCOUNTER — HOSPITAL ENCOUNTER (OUTPATIENT)
Dept: MAMMOGRAPHY | Age: 63
Discharge: HOME | End: 2023-06-27
Attending: INTERNAL MEDICINE
Payer: COMMERCIAL

## 2023-06-27 DIAGNOSIS — Z12.31 ENCOUNTER FOR SCREENING MAMMOGRAM FOR BREAST CANCER: ICD-10-CM

## 2023-06-27 PROCEDURE — 77063 BREAST TOMOSYNTHESIS BI: CPT | Performed by: INTERNAL MEDICINE

## 2023-06-27 PROCEDURE — 77067 SCR MAMMO BI INCL CAD: CPT | Performed by: INTERNAL MEDICINE

## 2023-08-01 RX ORDER — PANTOPRAZOLE SODIUM 40 MG/1
40 TABLET, DELAYED RELEASE ORAL
Qty: 90 TABLET | Refills: 0 | Status: SHIPPED | OUTPATIENT
Start: 2023-08-01

## 2023-08-01 NOTE — TELEPHONE ENCOUNTER
Requested Prescriptions     Pending Prescriptions Disp Refills    PANTOPRAZOLE 40 MG Oral Tab EC [Pharmacy Med Name: Pantoprazole Sodium 40 MG Oral Tablet Delayed Release] 90 tablet 0     Sig: TAKE 1 TABLET BY MOUTH BEFORE BREAKFAST       LOV 9/02/2021  LR  2/08/2023  Colon/EGD done 11/01/2021- 3 yr CLN recall  MyChart message sent for a follo-up appt      em

## 2023-08-09 RX ORDER — ROSUVASTATIN CALCIUM 10 MG/1
10 TABLET, COATED ORAL NIGHTLY
Qty: 90 TABLET | Refills: 1 | Status: SHIPPED | OUTPATIENT
Start: 2023-08-09

## 2023-08-09 NOTE — TELEPHONE ENCOUNTER
Please review. Protocol failed / Has no protocol. Requested Prescriptions   Pending Prescriptions Disp Refills    metFORMIN  MG Oral Tablet 24 Hr 270 tablet 1     Sig: Take 3 tablets (1,500 mg total) by mouth daily. Diabetes Medication Protocol Failed - 8/9/2023  8:01 AM        Failed - Last A1C < 7.5 and within past 6 months     Lab Results   Component Value Date    A1C 8.5 (A) 11/29/2022             Failed - In person appointment or virtual visit in the past 6 mos or appointment in next 3 mos     Recent Outpatient Visits              3 months ago Acute idiopathic gout of left foot    [facility], [address], Keenan Esqueda MD    Office Visit    5 months ago Acute idiopathic gout of left foot    [facility], [address], Keenan Esqueda MD    Office Visit    6 months ago Left ankle swelling    [facility], [address], Keenan Esqueda MD    Office Visit    8 months ago Acute idiopathic gout of left foot    [facility], [address], Tonya Ricardo MD    Office Visit    8 months ago Leg edema, left    [facility], [address], Adalberto Auguste [place]    Office Visit          Future Appointments         Provider Department Appt Notes    In 2 months Brielle Vo MD [facility], [address], [place] 6 mo f/u               Passed - EGFRCR or GFRAA > 50     GFR Evaluation  EGFRCR: 91 , resulted on 4/12/2023          Passed - GFR in the past 12 months          losartan 50 MG Oral Tab 90 tablet 1     Sig: Take 1 tablet (50 mg total) by mouth daily.        Hypertensive Medications Protocol Failed - 8/9/2023  8:01 AM        Failed - Last BP reading less than 140/90     BP Readings from Last 1 Encounters:  04/12/23 : 148/73              Failed - In person appointment or virtual visit in the past 6 months     Recent Outpatient Visits              3 months ago Acute idiopathic gout of left foot    [facility], [address], Keenan Esqueda MD    Office Visit    5 months ago Acute idiopathic gout of left foot    [facility], [address], Lele Means MD    Office Visit    6 months ago Left ankle swelling    [facility], [address], Keenan Esqueda MD    Office Visit    8 months ago Acute idiopathic gout of left foot    [facility], [address], Karel Barajas MD    Office Visit    8 months ago Leg edema, left    [facility], [address], [place]ina [place], [place]    Office Visit          Future Appointments         Provider Department Appt Notes    In 2 months MD Kisha Boss, [address], [place] 6 mo f/u               Passed - In person appointment in the past 12 or next 3 months     Recent Outpatient Visits              3 months ago Acute idiopathic gout of left foot    [facility], [address], Keenan Esqueda MD    Office Visit    5 months ago Acute idiopathic gout of left foot    [facility], [address], Keenan Esqueda MD    Office Visit    6 months ago Left ankle swelling    [facility], [address] [facility], Keenan Esqueda MD    Office Visit    8 months ago Acute idiopathic gout of left foot    [facility], [address], Karel Barajas MD    Office Visit    8 months ago Leg edema, left    [facility], [address], Adalberto Vaughn, [place]    Office Visit          Future Appointments         Provider Department Appt Notes    In 2 months MD Kisha Boss, [address], [place] 6 mo f/u               Passed - CMP or BMP in past 6 months     Recent Results (from the past 4392 hour(s))   COMP METABOLIC PANEL (14)    Collection Time: 04/12/23 10:24 AM Result Value Ref Range    Glucose 176 (H) 70 - 99 mg/dL    Sodium 140 136 - 145 mmol/L    Potassium 3.9 3.5 - 5.1 mmol/L    Chloride 104 98 - 112 mmol/L    CO2 28.0 21.0 - 32.0 mmol/L    Anion Gap 8 0 - 18 mmol/L    BUN 11 7 - 18 mg/dL    Creatinine 0.75 0.55 - 1.02 mg/dL    BUN/CREA Ratio 14.7 10.0 - 20.0    Calcium, Total 9.7 8.5 - 10.1 mg/dL    Calculated Osmolality 294 275 - 295 mOsm/kg    eGFR-Cr 91 >=60 mL/min/1.73m2    ALT 23 13 - 56 U/L    AST 14 (L) 15 - 37 U/L    Alkaline Phosphatase 94 50 - 130 U/L    Bilirubin, Total 0.3 0.1 - 2.0 mg/dL    Total Protein 7.2 6.4 - 8.2 g/dL    Albumin 3.7 3.4 - 5.0 g/dL    Globulin  3.5 2.8 - 4.4 g/dL    A/G Ratio 1.1 1.0 - 2.0    Patient Fasting for CMP? Yes      *Note: Due to a large number of results and/or encounters for the requested time period, some results have not been displayed. A complete set of results can be found in Results Review. Passed - EGFRCR or GFRAA > 50     GFR Evaluation  EGFRCR: 91 , resulted on 4/12/2023            hydroCHLOROthiazide 12.5 MG Oral Cap 90 capsule 1     Sig: Take 1 capsule (12.5 mg total) by mouth daily.        Hypertensive Medications Protocol Failed - 8/9/2023  8:01 AM        Failed - Last BP reading less than 140/90     BP Readings from Last 1 Encounters:  04/12/23 : 148/73              Failed - In person appointment or virtual visit in the past 6 months     Recent Outpatient Visits              3 months ago Acute idiopathic gout of left foot    [facility], [address] [facility],[address], Keenan Esqueda MD    Office Visit    5 months ago Acute idiopathic gout of left foot    [facility], [address] [facility],[address], Keenan Esqueda MD    Office Visit    6 months ago Left ankle swelling    [facility], [address], Keenan Esqueda MD    Office Visit    8 months ago Acute idiopathic gout of left foot    [facility], [address], Adalberto Sandoval Book, MD    Office Visit    8 months ago Leg edema, left    [facility], [address], [place], [place]    Office Visit          Future Appointments         Provider Department Appt Notes    In 2 months Aimee Green MD [address], [address], [place] 6 mo f/u               Passed - In person appointment in the past 12 or next 3 months     Recent Outpatient Visits              3 months ago Acute idiopathic gout of left foot    [address], [address], Keenan Esqueda MD    Office Visit    5 months ago Acute idiopathic gout of left foot    [address], [address], Keenan Esqueda MD    Office Visit    6 months ago Left ankle swelling    [facility], [address], Keenan Esqueda MD    Office Visit    8 months ago Acute idiopathic gout of left foot    [address], [address], Sheila Vega MD    Office Visit    8 months ago Leg edema, left    [facility], [address], [place], [place]    Office Visit          Future Appointments         Provider Department Appt Notes    In 2 months Aimee Green MD [facility], [address], [place] 6 mo f/u               Passed - CMP or BMP in past 6 months     Recent Results (from the past 4392 hour(s))   COMP METABOLIC PANEL (14)    Collection Time: 04/12/23 10:24 AM   Result Value Ref Range    Glucose 176 (H) 70 - 99 mg/dL    Sodium 140 136 - 145 mmol/L    Potassium 3.9 3.5 - 5.1 mmol/L    Chloride 104 98 - 112 mmol/L    CO2 28.0 21.0 - 32.0 mmol/L    Anion Gap 8 0 - 18 mmol/L    BUN 11 7 - 18 mg/dL    Creatinine 0.75 0.55 - 1.02 mg/dL    BUN/CREA Ratio 14.7 10.0 - 20.0    Calcium, Total 9.7 8.5 - 10.1 mg/dL    Calculated Osmolality 294 275 - 295 mOsm/kg    eGFR-Cr 91 >=60 mL/min/1.73m2    ALT 23 13 - 56 U/L    AST 14 (L) 15 - 37 U/L    Alkaline Phosphatase 94 50 - 130 U/L Bilirubin, Total 0.3 0.1 - 2.0 mg/dL    Total Protein 7.2 6.4 - 8.2 g/dL    Albumin 3.7 3.4 - 5.0 g/dL    Globulin  3.5 2.8 - 4.4 g/dL    A/G Ratio 1.1 1.0 - 2.0    Patient Fasting for CMP? Yes      *Note: Due to a large number of results and/or encounters for the requested time period, some results have not been displayed. A complete set of results can be found in Results Review. Passed - EGFRCR or GFRAA > 50     GFR Evaluation  EGFRCR: 91 , resulted on 4/12/2023           Signed Prescriptions Disp Refills    rosuvastatin 10 MG Oral Tab 90 tablet 1     Sig: Take 1 tablet (10 mg total) by mouth nightly.        Cholesterol Medication Protocol Passed - 8/9/2023  8:01 AM        Passed - ALT in past 12 months        Passed - LDL in past 12 months        Passed - Last ALT < 80     Lab Results   Component Value Date    ALT 23 04/12/2023             Passed - Last LDL < 130     Lab Results   Component Value Date    LDL 43 08/22/2022             Passed - In person appointment or virtual visit in the past 12 mos or appointment in next 3 mos     Recent Outpatient Visits              3 months ago Acute idiopathic gout of left foot    [address], [address], Keenan Esqueda MD    Office Visit    5 months ago Acute idiopathic gout of left foot    [address], [address], Radha Odom MD    Office Visit    6 months ago Left ankle swelling    [facility], [address], Keenan Esqueda MD    Office Visit    8 months ago Acute idiopathic gout of left foot    [facility], [address], Sergo Gongora MD    Office Visit    8 months ago Leg edema, left    [facility], [address], Peggy Oro    Office Visit          Future Appointments         Provider Department Appt Notes    In 2 months Sudha Rico MD [address], [address] [facility],[address], [place] 6 mo f/u Future Appointments         Provider Department Appt Notes    In 2 months Guillermo Riojas MD [facility], [address], [place] 6 mo f/u           Recent Outpatient Visits              3 months ago Acute idiopathic gout of left foot    [facility], [address], Keenan Esqueda MD    Office Visit    5 months ago Acute idiopathic gout of left foot    [facility], [address], Keenan Esqueda MD    Office Visit    6 months ago Left ankle swelling    [facility], [address], Keenan Esqueda MD    Office Visit    8 months ago Acute idiopathic gout of left foot    [facility], [address], Joy Frederick MD    Office Visit    8 months ago Leg edema, left    [facility], [address], Reanna [place]    Office Visit

## 2023-08-09 NOTE — TELEPHONE ENCOUNTER
Refill passed per [address] protocol. Requested Prescriptions   Pending Prescriptions Disp Refills    metFORMIN  MG Oral Tablet 24 Hr 270 tablet 1     Sig: Take 3 tablets (1,500 mg total) by mouth daily. Diabetes Medication Protocol Failed - 8/9/2023  8:01 AM        Failed - Last A1C < 7.5 and within past 6 months     Lab Results   Component Value Date    A1C 8.5 (A) 11/29/2022             Failed - In person appointment or virtual visit in the past 6 mos or appointment in next 3 mos     Recent Outpatient Visits              3 months ago Acute idiopathic gout of left foot    [address], [address] [facility],[address], Keenan Esqueda MD    Office Visit    5 months ago Acute idiopathic gout of left foot    [address], [address] [address], Keenan Esqueda MD    Office Visit    6 months ago Left ankle swelling    [facility], [address] [facility],[address], Keenan Esqueda MD    Office Visit    8 months ago Acute idiopathic gout of left foot    [facility], [address], Jolene Chawla MD    Office Visit    8 months ago Leg edema, left    [facility], [address], [place], [place]    Office Visit          Future Appointments         Provider Department Appt Notes    In 2 months Adi Curtis MD [facility], [address] [facility], [place] 6 mo f/u               Passed - EGFRCR or GFRAA > 50     GFR Evaluation  EGFRCR: 91 , resulted on 4/12/2023          Passed - GFR in the past 12 months          losartan 50 MG Oral Tab 90 tablet 1     Sig: Take 1 tablet (50 mg total) by mouth daily.        Hypertensive Medications Protocol Failed - 8/9/2023  8:01 AM        Failed - Last BP reading less than 140/90     BP Readings from Last 1 Encounters:  04/12/23 : 148/73              Failed - In person appointment or virtual visit in the past 6 months     Recent Outpatient Visits              3 months ago Acute idiopathic gout of left foot    [facility], [address], Keenan Esqueda MD    Office Visit    5 months ago Acute idiopathic gout of left foot    [address], [address], Priyanka Nieves MD    Office Visit    6 months ago Left ankle swelling    [facility], [address], Keenan Esqueda MD    Office Visit    8 months ago Acute idiopathic gout of left foot    [address], [address], Bethanie Cabot, MD    Office Visit    8 months ago Leg edema, left    [facility], [address], [place], [place]    Office Visit          Future Appointments         Provider Department Appt Notes    In 2 months Daniel Bynum MD [address], [address], [place] 6 mo f/u               Passed - In person appointment in the past 12 or next 3 months     Recent Outpatient Visits              3 months ago Acute idiopathic gout of left foot    [address], [address], Keenan Esqueda MD    Office Visit    5 months ago Acute idiopathic gout of left foot    [address], [address], Keenan Esqueda MD    Office Visit    6 months ago Left ankle swelling    [facility], [address], Keenan Esqueda MD    Office Visit    8 months ago Acute idiopathic gout of left foot    [facility], [address], Bethanie Cabot, MD    Office Visit    8 months ago Leg edema, left    [facility], [address], Adalberto Guajardo [place]    Office Visit          Future Appointments         Provider Department Appt Notes    In 2 months Daniel Bynum MD [address], [address], [place] 6 mo f/u               Passed - CMP or BMP in past 6 months     Recent Results (from the past 4392 hour(s))   COMP METABOLIC PANEL (14)    Collection Time: 04/12/23 10:24 AM   Result Value Ref Range    Glucose 176 (H) 70 - 99 mg/dL    Sodium 140 136 - 145 mmol/L    Potassium 3.9 3.5 - 5.1 mmol/L    Chloride 104 98 - 112 mmol/L    CO2 28.0 21.0 - 32.0 mmol/L    Anion Gap 8 0 - 18 mmol/L    BUN 11 7 - 18 mg/dL    Creatinine 0.75 0.55 - 1.02 mg/dL    BUN/CREA Ratio 14.7 10.0 - 20.0    Calcium, Total 9.7 8.5 - 10.1 mg/dL    Calculated Osmolality 294 275 - 295 mOsm/kg    eGFR-Cr 91 >=60 mL/min/1.73m2    ALT 23 13 - 56 U/L    AST 14 (L) 15 - 37 U/L    Alkaline Phosphatase 94 50 - 130 U/L    Bilirubin, Total 0.3 0.1 - 2.0 mg/dL    Total Protein 7.2 6.4 - 8.2 g/dL    Albumin 3.7 3.4 - 5.0 g/dL    Globulin  3.5 2.8 - 4.4 g/dL    A/G Ratio 1.1 1.0 - 2.0    Patient Fasting for CMP? Yes      *Note: Due to a large number of results and/or encounters for the requested time period, some results have not been displayed. A complete set of results can be found in Results Review. Passed - EGFRCR or GFRAA > 50     GFR Evaluation  EGFRCR: 91 , resulted on 4/12/2023            rosuvastatin 10 MG Oral Tab 90 tablet 1     Sig: Take 1 tablet (10 mg total) by mouth nightly.        Cholesterol Medication Protocol Passed - 8/9/2023  8:01 AM        Passed - ALT in past 12 months        Passed - LDL in past 12 months        Passed - Last ALT < 80     Lab Results   Component Value Date    ALT 23 04/12/2023             Passed - Last LDL < 130     Lab Results   Component Value Date    LDL 43 08/22/2022             Passed - In person appointment or virtual visit in the past 12 mos or appointment in next 3 mos     Recent Outpatient Visits              3 months ago Acute idiopathic gout of left foot    [address], [address] [facility],[address], Keenan Esqueda MD    Office Visit    5 months ago Acute idiopathic gout of left foot    [address], [address] [facility],[address], [place] Keenan Bowers MD    Office Visit    6 months ago Left ankle swelling    [address], [address], [place] Sudha Rico MD    Office Visit    8 months ago Acute idiopathic gout of left foot    [facility], [address], Adalberto Haddad MD    Office Visit    8 months ago Leg edema, left    [facility], [address], Adalberto [place], [place]    Office Visit          Future Appointments         Provider Department Appt Notes    In 2 months Sudha Rico MD [facility], [address], [place] 6 mo f/u                 hydroCHLOROthiazide 12.5 MG Oral Cap 90 capsule 1     Sig: Take 1 capsule (12.5 mg total) by mouth daily.        Hypertensive Medications Protocol Failed - 8/9/2023  8:01 AM        Failed - Last BP reading less than 140/90     BP Readings from Last 1 Encounters:  04/12/23 : 148/73              Failed - In person appointment or virtual visit in the past 6 months     Recent Outpatient Visits              3 months ago Acute idiopathic gout of left foot    [facility], [address], Keenan Esqueda MD    Office Visit    5 months ago Acute idiopathic gout of left foot    [facility], [address], Keenan Esqueda MD    Office Visit    6 months ago Left ankle swelling    [facility], [address], Keenan Esqueda MD    Office Visit    8 months ago Acute idiopathic gout of left foot    Sergo Rock MD    Office Visit    8 months ago Leg edema, left    [facility], [address], Adalberto [place], [place]    Office Visit          Future Appointments         Provider Department Appt Notes    In 2 months MD Crystal Cardoso, [address], [place] 6 mo f/u               Passed - In person appointment in the past 12 or next 3 months     Recent Outpatient Visits              3 months ago Acute idiopathic gout of left foot    [facility], [address], Aleida Hanson MD    Office Visit    5 months ago Acute idiopathic gout of left foot    [address], [address], Keenan Esqueda MD    Office Visit    6 months ago Left ankle swelling    [facility], [address], Keenan Esqueda MD    Office Visit    8 months ago Acute idiopathic gout of left foot    [address], [address], Adalberto Lechuga MD    Office Visit    8 months ago Leg edema, left    [facility], [address], Gabby Paulson [place]    Office Visit          Future Appointments         Provider Department Appt Notes    In 2 months Adi Curtis MD [facility], [address], [place] 6 mo f/u               Passed - CMP or BMP in past 6 months     Recent Results (from the past 4392 hour(s))   COMP METABOLIC PANEL (14)    Collection Time: 04/12/23 10:24 AM   Result Value Ref Range    Glucose 176 (H) 70 - 99 mg/dL    Sodium 140 136 - 145 mmol/L    Potassium 3.9 3.5 - 5.1 mmol/L    Chloride 104 98 - 112 mmol/L    CO2 28.0 21.0 - 32.0 mmol/L    Anion Gap 8 0 - 18 mmol/L    BUN 11 7 - 18 mg/dL    Creatinine 0.75 0.55 - 1.02 mg/dL    BUN/CREA Ratio 14.7 10.0 - 20.0    Calcium, Total 9.7 8.5 - 10.1 mg/dL    Calculated Osmolality 294 275 - 295 mOsm/kg    eGFR-Cr 91 >=60 mL/min/1.73m2    ALT 23 13 - 56 U/L    AST 14 (L) 15 - 37 U/L    Alkaline Phosphatase 94 50 - 130 U/L    Bilirubin, Total 0.3 0.1 - 2.0 mg/dL    Total Protein 7.2 6.4 - 8.2 g/dL    Albumin 3.7 3.4 - 5.0 g/dL    Globulin  3.5 2.8 - 4.4 g/dL    A/G Ratio 1.1 1.0 - 2.0    Patient Fasting for CMP? Yes      *Note: Due to a large number of results and/or encounters for the requested time period, some results have not been displayed. A complete set of results can be found in Results Review.                Passed - EGFRCR or GFRAA > 50     GFR Evaluation  EGFRCR: 91 , resulted on 4/12/2023             Future Appointments Provider Department Appt Notes    In 2 months Brandie Ferro MD [facility], [address], [place] 6 mo f/u          Recent Outpatient Visits              3 months ago Acute idiopathic gout of left foot    [facility], [address], Keenan Esqueda MD    Office Visit    5 months ago Acute idiopathic gout of left foot    [facility], [address], Keenan Esqueda MD    Office Visit    6 months ago Left ankle swelling    [facility], [address], Keenan Esqueda MD    Office Visit    8 months ago Acute idiopathic gout of left foot    [facility], [address], Collette Leyland, MD    Office Visit    8 months ago Leg edema, left    [facility], [address], [place]    Office Visit

## 2023-08-10 RX ORDER — LOSARTAN POTASSIUM 50 MG/1
50 TABLET ORAL DAILY
Qty: 90 TABLET | Refills: 0 | Status: SHIPPED | OUTPATIENT
Start: 2023-08-10

## 2023-08-10 RX ORDER — HYDROCHLOROTHIAZIDE 12.5 MG/1
12.5 CAPSULE, GELATIN COATED ORAL DAILY
Qty: 90 CAPSULE | Refills: 0 | Status: SHIPPED | OUTPATIENT
Start: 2023-08-10

## 2023-08-10 RX ORDER — METFORMIN HYDROCHLORIDE 500 MG/1
1500 TABLET, EXTENDED RELEASE ORAL DAILY
Qty: 270 TABLET | Refills: 0 | Status: SHIPPED | OUTPATIENT
Start: 2023-08-10

## 2023-08-11 NOTE — TELEPHONE ENCOUNTER
Spoke, with the patient and informed her of the message below. Pt states that she will call back to schedule an appointment.

## 2023-08-13 DIAGNOSIS — M25.472 LEFT ANKLE SWELLING: ICD-10-CM

## 2023-08-13 DIAGNOSIS — M10.072 ACUTE IDIOPATHIC GOUT OF LEFT FOOT: ICD-10-CM

## 2023-08-14 RX ORDER — ALLOPURINOL 100 MG/1
50 TABLET ORAL DAILY
Qty: 90 TABLET | Refills: 0 | Status: SHIPPED | OUTPATIENT
Start: 2023-08-14

## 2023-08-14 NOTE — TELEPHONE ENCOUNTER
LOV: 4/12/23  Last Refilled:#90, 0rfs 1/18/23  Labs:Uric acid 7.3  4/12/23    Future Appointments   Date Time Provider Jazmine Ag   10/9/2023  9:40 AM Josiane Ledesma MD [facility]     ASSESSMENT/PLAN:      Left foot pain and swelling likely secondary to gout- resolved   - Symptoms started back-and October 2022. She received a Medrol Dosepak in November and it helped slightly. Has not been on a Medrol Dosepak/steroids in the past 2 months.  - Uric acid elevated at 10.4 ow down to 6.2  - She is on allopurinol 50 mg daily. Gout has been stable, no flares  - Blood work today     R knee pain 2/2 OA  - XRs the past shows significant OA changes in the right knee  - Right knee was injected with cortisone during her last visit, it helped but symptoms are now coming back     Lower extremity edema  - Dopplers were negative. She now as resumed Hydrochlorothiazide      Fatty liver  - LFTs slightly elevated. Ultrasound the liver showed evidence of fatty liver  - She is on a low dose allopurinol  - LFT now normal      Pt will f/u in 6 mos      Paula Corona MD  4/12/2023   Please advise.

## 2023-09-21 ENCOUNTER — OFFICE VISIT (OUTPATIENT)
Dept: INTERNAL MEDICINE CLINIC | Facility: CLINIC | Age: 63
End: 2023-09-21

## 2023-09-21 ENCOUNTER — LAB ENCOUNTER (OUTPATIENT)
Dept: LAB | Age: 63
End: 2023-09-21
Attending: INTERNAL MEDICINE
Payer: COMMERCIAL

## 2023-09-21 VITALS
TEMPERATURE: 97 F | HEART RATE: 78 BPM | HEIGHT: 66 IN | WEIGHT: 226 LBS | OXYGEN SATURATION: 99 % | DIASTOLIC BLOOD PRESSURE: 76 MMHG | SYSTOLIC BLOOD PRESSURE: 124 MMHG | BODY MASS INDEX: 36.32 KG/M2

## 2023-09-21 DIAGNOSIS — E11.65 TYPE 2 DIABETES MELLITUS WITH HYPERGLYCEMIA, WITHOUT LONG-TERM CURRENT USE OF INSULIN (HCC): Primary | ICD-10-CM

## 2023-09-21 DIAGNOSIS — E11.65 TYPE 2 DIABETES MELLITUS WITH HYPERGLYCEMIA, WITHOUT LONG-TERM CURRENT USE OF INSULIN (HCC): ICD-10-CM

## 2023-09-21 DIAGNOSIS — I10 ESSENTIAL HYPERTENSION: ICD-10-CM

## 2023-09-21 DIAGNOSIS — E78.2 MIXED HYPERLIPIDEMIA: ICD-10-CM

## 2023-09-21 DIAGNOSIS — M25.472 LEFT ANKLE SWELLING: ICD-10-CM

## 2023-09-21 DIAGNOSIS — M10.072 ACUTE IDIOPATHIC GOUT OF LEFT FOOT: ICD-10-CM

## 2023-09-21 DIAGNOSIS — Z87.39 HISTORY OF GOUT: ICD-10-CM

## 2023-09-21 LAB
ALBUMIN SERPL-MCNC: 4.1 G/DL (ref 3.4–5)
ALBUMIN/GLOB SERPL: 1 {RATIO} (ref 1–2)
ALP LIVER SERPL-CCNC: 88 U/L
ALT SERPL-CCNC: 34 U/L
ANION GAP SERPL CALC-SCNC: 7 MMOL/L (ref 0–18)
AST SERPL-CCNC: 20 U/L (ref 15–37)
BILIRUB SERPL-MCNC: 0.4 MG/DL (ref 0.1–2)
BUN BLD-MCNC: 18 MG/DL (ref 7–18)
CALCIUM BLD-MCNC: 10 MG/DL (ref 8.5–10.1)
CARTRIDGE LOT#: ABNORMAL NUMERIC
CHLORIDE SERPL-SCNC: 105 MMOL/L (ref 98–112)
CHOLEST SERPL-MCNC: 235 MG/DL (ref ?–200)
CO2 SERPL-SCNC: 24 MMOL/L (ref 21–32)
CREAT BLD-MCNC: 0.61 MG/DL
CREAT UR-SCNC: 98.5 MG/DL
EGFRCR SERPLBLD CKD-EPI 2021: 101 ML/MIN/1.73M2 (ref 60–?)
FASTING PATIENT LIPID ANSWER: YES
FASTING STATUS PATIENT QL REPORTED: YES
GLOBULIN PLAS-MCNC: 4.1 G/DL (ref 2.8–4.4)
GLUCOSE BLD-MCNC: 117 MG/DL (ref 70–99)
HDLC SERPL-MCNC: 61 MG/DL (ref 40–59)
HEMOGLOBIN A1C: 7.3 % (ref 4.3–5.6)
LDLC SERPL CALC-MCNC: 133 MG/DL (ref ?–100)
MICROALBUMIN UR-MCNC: 1.88 MG/DL
MICROALBUMIN/CREAT 24H UR-RTO: 19.1 UG/MG (ref ?–30)
NONHDLC SERPL-MCNC: 174 MG/DL (ref ?–130)
OSMOLALITY SERPL CALC.SUM OF ELEC: 285 MOSM/KG (ref 275–295)
POTASSIUM SERPL-SCNC: 3.9 MMOL/L (ref 3.5–5.1)
PROT SERPL-MCNC: 8.2 G/DL (ref 6.4–8.2)
SODIUM SERPL-SCNC: 136 MMOL/L (ref 136–145)
TRIGL SERPL-MCNC: 231 MG/DL (ref 30–149)
URATE SERPL-MCNC: 7.1 MG/DL
VLDLC SERPL CALC-MCNC: 42 MG/DL (ref 0–30)

## 2023-09-21 PROCEDURE — 82570 ASSAY OF URINE CREATININE: CPT

## 2023-09-21 PROCEDURE — 3074F SYST BP LT 130 MM HG: CPT | Performed by: INTERNAL MEDICINE

## 2023-09-21 PROCEDURE — 82043 UR ALBUMIN QUANTITATIVE: CPT

## 2023-09-21 PROCEDURE — 3061F NEG MICROALBUMINURIA REV: CPT | Performed by: INTERNAL MEDICINE

## 2023-09-21 PROCEDURE — 3078F DIAST BP <80 MM HG: CPT | Performed by: INTERNAL MEDICINE

## 2023-09-21 PROCEDURE — 80053 COMPREHEN METABOLIC PANEL: CPT

## 2023-09-21 PROCEDURE — 80061 LIPID PANEL: CPT

## 2023-09-21 PROCEDURE — 83036 HEMOGLOBIN GLYCOSYLATED A1C: CPT | Performed by: INTERNAL MEDICINE

## 2023-09-21 PROCEDURE — 3008F BODY MASS INDEX DOCD: CPT | Performed by: INTERNAL MEDICINE

## 2023-09-21 PROCEDURE — 84550 ASSAY OF BLOOD/URIC ACID: CPT

## 2023-09-21 PROCEDURE — 36415 COLL VENOUS BLD VENIPUNCTURE: CPT

## 2023-09-21 PROCEDURE — 3051F HG A1C>EQUAL 7.0%<8.0%: CPT | Performed by: INTERNAL MEDICINE

## 2023-09-21 PROCEDURE — 99214 OFFICE O/P EST MOD 30 MIN: CPT | Performed by: INTERNAL MEDICINE

## 2023-09-21 RX ORDER — BLOOD-GLUCOSE METER
EACH MISCELLANEOUS
Qty: 1 KIT | Refills: 0 | Status: SHIPPED | OUTPATIENT
Start: 2023-09-21
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
## 2023-09-25 RX ORDER — ROSUVASTATIN CALCIUM 20 MG/1
20 TABLET, COATED ORAL NIGHTLY
Qty: 90 TABLET | Refills: 0 | Status: SHIPPED | OUTPATIENT
Start: 2023-09-25

## 2023-09-25 RX ORDER — DULAGLUTIDE 0.75 MG/.5ML
0.75 INJECTION, SOLUTION SUBCUTANEOUS WEEKLY
Qty: 6 EACH | Refills: 0 | Status: CANCELLED | OUTPATIENT
Start: 2023-09-25

## 2023-10-08 ENCOUNTER — TELEPHONE (OUTPATIENT)
Dept: INTERNAL MEDICINE CLINIC | Facility: CLINIC | Age: 63
End: 2023-10-08

## 2023-10-08 RX ORDER — DULAGLUTIDE 0.75 MG/.5ML
0.75 INJECTION, SOLUTION SUBCUTANEOUS WEEKLY
Qty: 12 EACH | Refills: 0 | Status: SHIPPED | OUTPATIENT
Start: 2023-10-08

## 2023-10-09 ENCOUNTER — OFFICE VISIT (OUTPATIENT)
Dept: RHEUMATOLOGY | Facility: CLINIC | Age: 63
End: 2023-10-09

## 2023-10-09 VITALS
SYSTOLIC BLOOD PRESSURE: 146 MMHG | WEIGHT: 228 LBS | HEART RATE: 81 BPM | DIASTOLIC BLOOD PRESSURE: 83 MMHG | BODY MASS INDEX: 37 KG/M2

## 2023-10-09 DIAGNOSIS — Z51.81 MEDICATION MONITORING ENCOUNTER: Primary | ICD-10-CM

## 2023-10-09 DIAGNOSIS — M25.472 LEFT ANKLE SWELLING: ICD-10-CM

## 2023-10-09 DIAGNOSIS — M10.072 ACUTE IDIOPATHIC GOUT OF LEFT FOOT: ICD-10-CM

## 2023-10-09 PROCEDURE — 3077F SYST BP >= 140 MM HG: CPT | Performed by: INTERNAL MEDICINE

## 2023-10-09 PROCEDURE — 99214 OFFICE O/P EST MOD 30 MIN: CPT | Performed by: INTERNAL MEDICINE

## 2023-10-09 PROCEDURE — 3079F DIAST BP 80-89 MM HG: CPT | Performed by: INTERNAL MEDICINE

## 2023-10-09 RX ORDER — ALLOPURINOL 100 MG/1
100 TABLET ORAL DAILY
Qty: 90 TABLET | Refills: 3 | Status: SHIPPED | OUTPATIENT
Start: 2023-10-09

## 2023-10-09 RX ORDER — CYCLOBENZAPRINE HCL 5 MG
5 TABLET ORAL AS NEEDED
COMMUNITY
Start: 2023-07-03
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
## 2023-10-09 NOTE — PATIENT INSTRUCTIONS
You were seen today for gout  Symptoms been stable  Uric acid on 7.181 around 6.0  Take allopurinol 100 mg daily which is 1 pill a day  Blood work in 6 months, April  See me in April

## 2023-10-13 ENCOUNTER — TELEPHONE (OUTPATIENT)
Dept: INTERNAL MEDICINE CLINIC | Facility: CLINIC | Age: 63
End: 2023-10-13

## 2023-10-13 DIAGNOSIS — M25.511 ACUTE PAIN OF RIGHT SHOULDER: Primary | ICD-10-CM

## 2023-10-13 NOTE — TELEPHONE ENCOUNTER
Pls clarify with pt about arm lesion if she is asking for dermatology referral. If she is then we can refer her to Dr Lexi Haider.

## 2023-11-02 RX ORDER — PANTOPRAZOLE SODIUM 40 MG/1
40 TABLET, DELAYED RELEASE ORAL
Qty: 90 TABLET | Refills: 0 | OUTPATIENT
Start: 2023-11-02

## 2023-11-05 ENCOUNTER — PATIENT MESSAGE (OUTPATIENT)
Dept: INTERNAL MEDICINE CLINIC | Facility: CLINIC | Age: 63
End: 2023-11-05

## 2023-11-05 DIAGNOSIS — E11.9 DIABETIC EYE EXAM (HCC): Primary | ICD-10-CM

## 2023-11-05 DIAGNOSIS — Z01.00 DIABETIC EYE EXAM (HCC): Primary | ICD-10-CM

## 2023-11-06 NOTE — TELEPHONE ENCOUNTER
Dr. Carlos Manuel Arroyo, please see pended referral.     From: Zoë Christopher  To:  Idris Prescott  Sent: 11/5/2023  1:37 PM CST  Subject: Diabetic eye exam    Dr Jossie Hernandez please send referral for me to see Dr Trisha Sotomayor about my annual eye exam

## 2023-11-13 ENCOUNTER — HOSPITAL ENCOUNTER (OUTPATIENT)
Dept: GENERAL RADIOLOGY | Facility: HOSPITAL | Age: 63
Discharge: HOME | End: 2023-11-13
Attending: ORTHOPAEDIC SURGERY
Payer: COMMERCIAL

## 2023-11-13 ENCOUNTER — OFFICE VISIT (OUTPATIENT)
Dept: ORTHOPEDICS CLINIC | Facility: CLINIC | Age: 63
End: 2023-11-13
Payer: COMMERCIAL

## 2023-11-13 VITALS — BODY MASS INDEX: 37 KG/M2 | WEIGHT: 228 LBS

## 2023-11-13 DIAGNOSIS — M25.511 RIGHT SHOULDER PAIN, UNSPECIFIED CHRONICITY: ICD-10-CM

## 2023-11-13 DIAGNOSIS — M25.511 RIGHT SHOULDER PAIN, UNSPECIFIED CHRONICITY: Primary | ICD-10-CM

## 2023-11-13 DIAGNOSIS — M67.911 DISORDER OF RIGHT ROTATOR CUFF: ICD-10-CM

## 2023-11-13 PROCEDURE — 73030 X-RAY EXAM OF SHOULDER: CPT | Performed by: ORTHOPAEDIC SURGERY

## 2023-11-16 RX ORDER — METFORMIN HYDROCHLORIDE 500 MG/1
1500 TABLET, EXTENDED RELEASE ORAL DAILY
Qty: 270 TABLET | Refills: 3 | Status: SHIPPED | OUTPATIENT
Start: 2023-11-16

## 2023-11-16 NOTE — TELEPHONE ENCOUNTER
Please review; protocol failed. Requested Prescriptions   Pending Prescriptions Disp Refills    hydroCHLOROthiazide 12.5 MG Oral Cap [Pharmacy Med Name: hydroCHLOROthiazide 12.5 MG Oral Capsule] 90 capsule 0     Sig: Take 1 capsule (12.5 mg total) by mouth daily.        Hypertensive Medications Protocol Failed - 11/15/2023  7:00 PM        Failed - Last BP reading less than 140/90     BP Readings from Last 1 Encounters:   10/09/23 146/83               Passed - In person appointment in the past 12 or next 3 months     Recent Outpatient Visits              3 days ago Right shoulder pain, unspecified chronicity    Rajat Bravo Lorena Sager, MD    Office Visit    1 month ago Medication monitoring encounter    Brittany Cuff, [address] [facility], Keenan Esqueda MD    Office Visit    1 month ago Type 2 diabetes mellitus with hyperglycemia, without long-term current use of insulin [facility])    Brittany Cuff, [address] [facility], Ivan Lindsey MD    Office Visit    7 months ago Acute idiopathic gout of left foot    Brittany Cuff, [address], Keenan Esqueda MD    Office Visit    9 months ago Acute idiopathic gout of left foot    Brittany Cuff, [address], Arias Tse MD    Office Visit          Future Appointments         Provider Department Appt Notes    In 3 weeks ALVIN [facility], [address], [place] double check reflux issues and refill meds    In 4 months Tanya Ellsworth MD Brittany Cuff, [address] [facility],[address], [place] 6 mo f/u               Passed - CMP or BMP in past 6 months     Recent Results (from the past 4392 hour(s))   Comp Metabolic Panel (14)    Collection Time: 09/21/23 11:13 AM   Result Value Ref Range    Glucose 117 (H) 70 - 99 mg/dL    Sodium 136 136 - 145 mmol/L    Potassium 3.9 3.5 - 5.1 mmol/L    Chloride 105 98 - 112 mmol/L    CO2 24.0 21.0 - 32.0 mmol/L    Anion Gap 7 0 - 18 mmol/L    BUN 18 7 - 18 mg/dL    Creatinine 0.61 0.55 - 1.02 mg/dL    Calcium, Total 10.0 8.5 - 10.1 mg/dL    Calculated Osmolality 285 275 - 295 mOsm/kg    eGFR-Cr 101 >=60 mL/min/1.73m2    AST 20 15 - 37 U/L    ALT 34 13 - 56 U/L    Alkaline Phosphatase 88 50 - 130 U/L    Bilirubin, Total 0.4 0.1 - 2.0 mg/dL    Total Protein 8.2 6.4 - 8.2 g/dL    Albumin 4.1 3.4 - 5.0 g/dL    Globulin  4.1 2.8 - 4.4 g/dL    A/G Ratio 1.0 1.0 - 2.0    Patient Fasting for CMP? Yes      *Note: Due to a large number of results and/or encounters for the requested time period, some results have not been displayed. A complete set of results can be found in Results Review.                Passed - In person appointment or virtual visit in the past 6 months     Recent Outpatient Visits              3 days ago Right shoulder pain, unspecified chronicity    Sona Lee MD    Office Visit    1 month ago Medication monitoring encounter    Rina Lee Christopherland, MD    Office Visit    1 month ago Type 2 diabetes mellitus with hyperglycemia, without long-term current use of insulin [facility])    Ivan Lee MD    Office Visit    7 months ago Acute idiopathic gout of left foot    [address], Rina Louise Christopher[place], MD    Office Visit    9 months ago Acute idiopathic gout of left foot    [address], Arias Louise MD    Office Visit          Future Appointments         Provider Department Appt Notes    In 3 weeks [facility], [address] [facility] double check reflux issues and refill meds    In 4 months Tanya Ellsworth MD [address], Davey Louise 6 mo f/u               Passed - [facility] or GFRAA > 50     GFR Evaluation  EGFRCR: 101 , resulted on 9/21/2023            losartan 50 MG Oral Tab [Pharmacy Med Name: Losartan Potassium 50 MG Oral Tablet] 90 tablet 0     Sig: Take 1 tablet (50 mg total) by mouth daily.        Hypertensive Medications Protocol Failed - 11/15/2023  7:00 PM        Failed - Last BP reading less than 140/90     BP Readings from Last 1 Encounters:   10/09/23 146/83               Passed - In person appointment in the past 12 or next 3 months     Recent Outpatient Visits              3 days ago Right shoulder pain, unspecified chronicity    [address] [facility]    Office Visit    1 month ago Medication monitoring encounter    [address], [address], Keenan Esqueda MD    Office Visit    1 month ago Type 2 diabetes mellitus with hyperglycemia, without long-term current use of insulin [facility])    [address], [facility], Joy Frederick MD    Office Visit    7 months ago Acute idiopathic gout of left foot    [address], [address], Keenan Esqueda MD    Office Visit    9 months ago Acute idiopathic gout of left foot    [address], [address], Federico Marin MD    Office Visit          Future Appointments         Provider Department Appt Notes    In 3 weeks [facility], [address], [place] double check reflux issues and refill meds    In 4 months Guillermo Riojas MD [address], [address], [place] 6 mo f/u               Passed - CMP or BMP in past 6 months     Recent Results (from the past 4392 hour(s))   Comp Metabolic Panel (14)    Collection Time: 09/21/23 11:13 AM   Result Value Ref Range    Glucose 117 (H) 70 - 99 mg/dL    Sodium 136 136 - 145 mmol/L    Potassium 3.9 3.5 - 5.1 mmol/L    Chloride 105 98 - 112 mmol/L    CO2 24.0 21.0 - 32.0 mmol/L Anion Gap 7 0 - 18 mmol/L    BUN 18 7 - 18 mg/dL    Creatinine 0.61 0.55 - 1.02 mg/dL    Calcium, Total 10.0 8.5 - 10.1 mg/dL    Calculated Osmolality 285 275 - 295 mOsm/kg    eGFR-Cr 101 >=60 mL/min/1.73m2    AST 20 15 - 37 U/L    ALT 34 13 - 56 U/L    Alkaline Phosphatase 88 50 - 130 U/L    Bilirubin, Total 0.4 0.1 - 2.0 mg/dL    Total Protein 8.2 6.4 - 8.2 g/dL    Albumin 4.1 3.4 - 5.0 g/dL    Globulin  4.1 2.8 - 4.4 g/dL    A/G Ratio 1.0 1.0 - 2.0    Patient Fasting for CMP? Yes      *Note: Due to a large number of results and/or encounters for the requested time period, some results have not been displayed. A complete set of results can be found in Results Review.                Passed - In person appointment or virtual visit in the past 6 months     Recent Outpatient Visits              3 days ago Right shoulder pain, unspecified chronicity    [address] [address], Camila Snyder MD    Office Visit    1 month ago Medication monitoring encounter    [address] [facility] [address], Keenan Esqueda MD    Office Visit    1 month ago Type 2 diabetes mellitus with hyperglycemia, without long-term current use of insulin [facility])    [address], [address], Angel Escobedo MD    Office Visit    7 months ago Acute idiopathic gout of left foot    [facility], [address], Keenan Esqueda MD    Office Visit    9 months ago Acute idiopathic gout of left foot    [address], [address], Eveline Carter MD    Office Visit          Future Appointments         Provider Department Appt Notes    In 3 weeks ALVIN Lua [facility], [address] double check reflux issues and refill meds    In 4 months Josiane Ledesma [place] [address] [facility] 6 mo f/u               Passed - EGFRCR or GFRAA > 50     GFR Evaluation  EGFRCR: 101 , resulted on 2023           Signed Prescriptions Disp Refills    metFORMIN  MG Oral Tablet 24 Hr 270 tablet 3     Sig: Take 3 tablets (1,500 mg total) by mouth daily.        Diabetes Medication Protocol Passed - 11/15/2023  7:00 PM        Passed - Last A1C < 7.5 and within past 6 months     Lab Results   Component Value Date    A1C 7.3 (A) 2023             Passed - In person appointment or virtual visit in the past 6 mos or appointment in next 3 mos     Recent Outpatient Visits              3 days ago Right shoulder pain, unspecified chronicity    [address], [address], Jorge Baez MD    Office Visit    1 month ago Medication monitoring encounter    [address] [facility], Keenan Esqueda MD    Office Visit    1 month ago Type 2 diabetes mellitus with hyperglycemia, without long-term current use of insulin ([facility] [address]    [address], [facility], Prosper Alfred MD    Office Visit    7 months ago Acute idiopathic gout of left foot    [facility], [address], Keenan Esqueda MD    Office Visit    9 months ago Acute idiopathic gout of left foot    [facility], [address], Ashley Renee MD    Office Visit          Future Appointments         Provider Department Appt Notes    In 3 weeks ALVIN [facility], [address] [facility] double check reflux issues and refill meds    In 4 months Vishnu Marquez MD [address], [address], [place] 6 mo f/u               Passed - EGFRCR or GFRAA > 50     GFR Evaluation  EGFRCR: 101 , resulted on 2023          Passed - GFR in the past 12 months           Recent Outpatient Visits              3 days ago Right shoulder pain, unspecified chronicity    [address] [facility], Jorge Baez MD    Office Visit    1 month ago Medication monitoring encounter    Amisha Burkett MD    Office Visit    1 month ago Type 2 diabetes mellitus with hyperglycemia, without long-term current use of insulin [facility])    Kristina Cabrera, Mathew Guerrier MD    Office Visit    7 months ago Acute idiopathic gout of left foot    [facility], [address], Megan Esqueda MD    Office Visit    9 months ago Acute idiopathic gout of left foot    Mahi Oakes, [address], Torsten Alarcon MD    Office Visit          Future Appointments         Provider Department Appt Notes    In 3 weeks ALVIN Ryder [facility], [address] double check reflux issues and refill meds    In 4 months MD Mahi Campa, [address], [place] 6 mo f/u

## 2023-11-16 NOTE — TELEPHONE ENCOUNTER
Refill passed per [place] [employer] protocol.   Requested Prescriptions   Pending Prescriptions Disp Refills    METFORMIN  MG Oral Tablet 24 Hr [Pharmacy Med Name: metFORMIN HCl  MG Oral Tablet Extended Release 24 Hour] 270 tablet 0     Sig: Take 3 tablets by mouth once daily       Diabetes Medication Protocol Passed - 11/15/2023  7:00 PM        Passed - Last A1C < 7.5 and within past 6 months     Lab Results   Component Value Date    A1C 7.3 (A) 09/21/2023             Passed - In person appointment or virtual visit in the past 6 mos or appointment in next 3 mos     Recent Outpatient Visits              3 days ago Right shoulder pain, unspecified chronicity    Gurinder Valerio MD    Office Visit    1 month ago Medication monitoring encounter    Mahi Oakes, [address], Megan Esqueda MD    Office Visit    1 month ago Type 2 diabetes mellitus with hyperglycemia, without long-term current use of insulin ([address]    Mahi Oakes, [facility], Mathew Guerrier MD    Office Visit    7 months ago Acute idiopathic gout of left foot    Mahi Julianamolly, [address], Megan Esqueda MD    Office Visit    9 months ago Acute idiopathic gout of left foot    Mahi Andrewsing, [address], Torsten Alarcon MD    Office Visit          Future Appointments         Provider Department Appt Notes    In 3 weeks [facility], [address], [facility] double check reflux issues and refill meds    In 4 months MD Kristina Campa [place] 6 mo f/u               Passed - EGFRCR or GFRAA > 50     GFR Evaluation  EGFRCR: 101 , resulted on 9/21/2023          Passed - GFR in the past 12 months          HYDROCHLOROTHIAZIDE 12.5 MG Oral Cap [Pharmacy Med Name: hydroCHLOROthiazide 12.5 MG Oral Capsule] 90 capsule 0     Sig: Take 1 capsule by mouth once daily       Hypertensive Medications Protocol Failed - 11/15/2023  7:00 PM        Failed - Last BP reading less than 140/90     BP Readings from Last 1 Encounters:   10/09/23 146/83               Passed - In person appointment in the past 12 or next 3 months     Recent Outpatient Visits              3 days ago Right shoulder pain, unspecified chronicity    [address] [facility], Gabriela Reed MD    Office Visit    1 month ago Medication monitoring encounter    [address], [address], Angela Wolfe MD    Office Visit    1 month ago Type 2 diabetes mellitus with hyperglycemia, without long-term current use of insulin ([facility]    [address], [address], Nisha Ahser MD    Office Visit    7 months ago Acute idiopathic gout of left foot    [facility], [address] [facility],[address], Keenan Esqueda MD    Office Visit    9 months ago Acute idiopathic gout of left foot    [facility], [address] [facility],[address], Angela Wolfe MD    Office Visit          Future Appointments         Provider Department Appt Notes    In 3 weeks ALVIN Nath [facility], [address] [facility],[address], [facility] double check reflux issues and refill meds    In 4 months Apolinar Dance, MD [facility], [address], [place] 6 mo f/u               Passed - CMP or BMP in past 6 months     Recent Results (from the past 4392 hour(s))   Comp Metabolic Panel (14)    Collection Time: 09/21/23 11:13 AM   Result Value Ref Range    Glucose 117 (H) 70 - 99 mg/dL    Sodium 136 136 - 145 mmol/L    Potassium 3.9 3.5 - 5.1 mmol/L    Chloride 105 98 - 112 mmol/L    CO2 24.0 21.0 - 32.0 mmol/L    Anion Gap 7 0 - 18 mmol/L    BUN 18 7 - 18 mg/dL    Creatinine 0.61 0.55 - 1.02 mg/dL    Calcium, Total 10.0 8.5 - 10.1 mg/dL    Calculated Osmolality 285 275 - 295 mOsm/kg eGFR-Cr 101 >=60 mL/min/1.73m2    AST 20 15 - 37 U/L    ALT 34 13 - 56 U/L    Alkaline Phosphatase 88 50 - 130 U/L    Bilirubin, Total 0.4 0.1 - 2.0 mg/dL    Total Protein 8.2 6.4 - 8.2 g/dL    Albumin 4.1 3.4 - 5.0 g/dL    Globulin  4.1 2.8 - 4.4 g/dL    A/G Ratio 1.0 1.0 - 2.0    Patient Fasting for CMP? Yes      *Note: Due to a large number of results and/or encounters for the requested time period, some results have not been displayed. A complete set of results can be found in Results Review.                Passed - In person appointment or virtual visit in the past 6 months     Recent Outpatient Visits              3 days ago Right shoulder pain, unspecified chronicity    [facility], Delia Hernandez MD    Office Visit    1 month ago Medication monitoring encounter    [address] [place] [address], Keenan Esqueda MD    Office Visit    1 month ago Type 2 diabetes mellitus with hyperglycemia, without long-term current use of insulin [facility])    [facility], Karuna Alfred MD    Office Visit    7 months ago Acute idiopathic gout of left foot    [address], [address] [facility],[address], Keenan Esqueda MD    Office Visit    9 months ago Acute idiopathic gout of left foot    [address], [address] [facility],[address], Grayson Meza MD    Office Visit          Future Appointments         Provider Department Appt Notes    In 3 weeks [facility], [address] [facility],[address], [facility] double check reflux issues and refill meds    In 4 months Gabriela Mcdermott MD [address], [address], [place] 6 mo f/u               Passed - EGFRCR or GFRAA > 50     GFR Evaluation  EGFRCR: 101 , resulted on 2023            LOSARTAN 48 MG Oral Tab [Pharmacy Med Name: Losartan Potassium 50 MG Oral Tablet] 90 tablet 0     Sig: Take 1 tablet by mouth once daily Hypertensive Medications Protocol Failed - 11/15/2023  7:00 PM        Failed - Last BP reading less than 140/90     BP Readings from Last 1 Encounters:   10/09/23 146/83               Passed - In person appointment in the past 12 or next 3 months     Recent Outpatient Visits              3 days ago Right shoulder pain, unspecified chronicity    [address], [address], Marie Baez MD    Office Visit    1 month ago Medication monitoring encounter    [address] [facility], Keenan Esqueda MD    Office Visit    1 month ago Type 2 diabetes mellitus with hyperglycemia, without long-term current use of insulin ([facility]    [address], [address], Karuna Alfred MD    Office Visit    7 months ago Acute idiopathic gout of left foot    [facility], [address], Keenan Esqueda MD    Office Visit    9 months ago Acute idiopathic gout of left foot    [facility], [address] [facility],[address], Grayson Meza MD    Office Visit          Future Appointments         Provider Department Appt Notes    In 3 weeks ALVIN Perez [facility], [address], [address] double check reflux issues and refill meds    In 4 months Gabriela Mcdermott MD [facility], [address] [facility],[address], [place] 6 mo f/u               Passed - CMP or BMP in past 6 months     Recent Results (from the past 4392 hour(s))   Comp Metabolic Panel (14)    Collection Time: 23 11:13 AM   Result Value Ref Range    Glucose 117 (H) 70 - 99 mg/dL    Sodium 136 136 - 145 mmol/L    Potassium 3.9 3.5 - 5.1 mmol/L    Chloride 105 98 - 112 mmol/L    CO2 24.0 21.0 - 32.0 mmol/L    Anion Gap 7 0 - 18 mmol/L    BUN 18 7 - 18 mg/dL    Creatinine 0.61 0.55 - 1.02 mg/dL    Calcium, Total 10.0 8.5 - 10.1 mg/dL    Calculated Osmolality 285 275 - 295 mOsm/kg    eGFR-Cr 101 >=60 mL/min/1.73m2    AST 20 15 - 37 U/L ALT 34 13 - 56 U/L    Alkaline Phosphatase 88 50 - 130 U/L    Bilirubin, Total 0.4 0.1 - 2.0 mg/dL    Total Protein 8.2 6.4 - 8.2 g/dL    Albumin 4.1 3.4 - 5.0 g/dL    Globulin  4.1 2.8 - 4.4 g/dL    A/G Ratio 1.0 1.0 - 2.0    Patient Fasting for CMP? Yes      *Note: Due to a large number of results and/or encounters for the requested time period, some results have not been displayed. A complete set of results can be found in Results Review.                Passed - In person appointment or virtual visit in the past 6 months     Recent Outpatient Visits              3 days ago Right shoulder pain, unspecified chronicity    [address] [facility], Gabriela Reed MD    Office Visit    1 month ago Medication monitoring encounter    [address] [facility], Keenan Esqueda MD    Office Visit    1 month ago Type 2 diabetes mellitus with hyperglycemia, without long-term current use of insulin ([facility]    [address], [address], Nisha Asher MD    Office Visit    7 months ago Acute idiopathic gout of left foot    [address], [address], Keenan Esqueda MD    Office Visit    9 months ago Acute idiopathic gout of left foot    [address], [address] [facility] [address], Angela Wolfe MD    Office Visit          Future Appointments         Provider Department Appt Notes    In [facility] weeks [facility], [address] [facility] double check reflux issues and refill meds    In 4 months Apolinar Dance, MD [address], [address], [employer] [place] 6 mo f/u               Passed - EGFRCR or GFRAA > 50     GFR Evaluation  EGFRCR: 101 , resulted on 9/21/2023             Recent Outpatient Visits              3 days ago Right shoulder pain, unspecified chronicity    [address], [address], Gabriela Reed MD    Office Visit 1 month ago Medication monitoring encounter    [address], [address], Keenan Esqueda MD    Office Visit    1 month ago Type 2 diabetes mellitus with hyperglycemia, without long-term current use of insulin [facility])    [address] [facility], Bethanie Cabot, MD    Office Visit    7 months ago Acute idiopathic gout of left foot    [facility], [address], Keenan Esqueda MD    Office Visit    9 months ago Acute idiopathic gout of left foot    [address], [address], Keenan Esqueda MD    Office Visit          Future Appointments         Provider Department Appt Notes    In 3 weeks ALVIN Matt [facility], [address] [facility] double check reflux issues and refill meds    In 4 months Daniel Bynum MD [address], [address], [place] 6 mo f/u

## 2023-11-17 RX ORDER — HYDROCHLOROTHIAZIDE 12.5 MG/1
12.5 CAPSULE, GELATIN COATED ORAL DAILY
Qty: 90 CAPSULE | Refills: 0 | Status: SHIPPED | OUTPATIENT
Start: 2023-11-17

## 2023-11-17 RX ORDER — LOSARTAN POTASSIUM 50 MG/1
50 TABLET ORAL DAILY
Qty: 90 TABLET | Refills: 0 | Status: SHIPPED | OUTPATIENT
Start: 2023-11-17
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
## 2023-12-12 ENCOUNTER — OFFICE VISIT (OUTPATIENT)
Facility: CLINIC | Age: 63
End: 2023-12-12
Payer: COMMERCIAL

## 2023-12-12 VITALS
WEIGHT: 228 LBS | HEIGHT: 66 IN | SYSTOLIC BLOOD PRESSURE: 127 MMHG | BODY MASS INDEX: 36.64 KG/M2 | DIASTOLIC BLOOD PRESSURE: 84 MMHG | HEART RATE: 94 BPM

## 2023-12-12 DIAGNOSIS — K59.00 CONSTIPATION, UNSPECIFIED CONSTIPATION TYPE: ICD-10-CM

## 2023-12-12 DIAGNOSIS — K21.9 GASTROESOPHAGEAL REFLUX DISEASE, UNSPECIFIED WHETHER ESOPHAGITIS PRESENT: Primary | ICD-10-CM

## 2023-12-12 DIAGNOSIS — Z12.11 COLON CANCER SCREENING: ICD-10-CM

## 2023-12-12 PROCEDURE — 3074F SYST BP LT 130 MM HG: CPT | Performed by: NURSE PRACTITIONER

## 2023-12-12 PROCEDURE — 3008F BODY MASS INDEX DOCD: CPT | Performed by: NURSE PRACTITIONER

## 2023-12-12 PROCEDURE — 99215 OFFICE O/P EST HI 40 MIN: CPT | Performed by: NURSE PRACTITIONER

## 2023-12-12 PROCEDURE — 3079F DIAST BP 80-89 MM HG: CPT | Performed by: NURSE PRACTITIONER

## 2023-12-12 RX ORDER — PANTOPRAZOLE SODIUM 40 MG/1
40 TABLET, DELAYED RELEASE ORAL
Qty: 90 TABLET | Refills: 3 | Status: SHIPPED | OUTPATIENT
Start: 2023-12-12

## 2023-12-12 RX ORDER — POLYETHYLENE GLYCOL 3350, SODIUM CHLORIDE, SODIUM BICARBONATE, POTASSIUM CHLORIDE 420; 11.2; 5.72; 1.48 G/4L; G/4L; G/4L; G/4L
POWDER, FOR SOLUTION ORAL
Qty: 4000 ML | Refills: 0 | Status: SHIPPED | OUTPATIENT
Start: 2023-12-12

## 2023-12-21 RX ORDER — ROSUVASTATIN CALCIUM 20 MG/1
20 TABLET, COATED ORAL NIGHTLY
Qty: 90 TABLET | Refills: 0 | Status: SHIPPED | OUTPATIENT
Start: 2023-12-21

## 2023-12-31 NOTE — TELEPHONE ENCOUNTER
Refill passed per [facility] protocol. Does the dose need to be changed? Medication pended for your review / approval      Requested Prescriptions   Pending Prescriptions Disp Refills    TRULICITY 5.28 UH/1.3EG Subcutaneous Solution Pen-injector [Pharmacy Med Name: Trulicity 6.16 STEPHY/4.2XU Subcutaneous Solution Pen-injector] 12 mL 0     Sig: Inject 0.75 mg into the skin once a week.        Diabetes Medication Protocol Passed - 12/30/2023  9:19 AM        Passed - Last A1C < 7.5 and within past 6 months     Lab Results   Component Value Date    A1C 7.3 (A) 09/21/2023             Passed - In person appointment or virtual visit in the past 6 mos or appointment in next 3 mos     Recent Outpatient Visits              2 weeks ago Gastroesophageal reflux disease, unspecified whether esophagitis present    [address] [facility], ALVIN Robison    Office Visit    1 month ago Right shoulder pain, unspecified chronicity    [address] [facility], Gabriela Reed MD    Office Visit    2 months ago Medication monitoring encounter    [address], [address], Angela Wolfe MD    Office Visit    3 months ago Type 2 diabetes mellitus with hyperglycemia, without long-term current use of insulin [facility])    [address], [address], Nisha Asher MD    Office Visit    8 months ago Acute idiopathic gout of left foot    [address], [address], Angela Wolfe MD    Office Visit          Future Appointments         Provider Department Appt Notes    In 1 month Zach Harper MD [address], [address], [employer] in arm    In 3 months Apolinar Dance, MD [address], [address], [place] 6 mo f/u    In 10 months Casandra Bowles, [address], [address], [facility] Colon SCRN @ [address] [facility] EGFRCR or GFRAA > 50     GFR Evaluation  EGFRCR: 101 , resulted on 9/21/2023          Passed - GFR in the past 12 months           Future Appointments         Provider Department Appt Notes    In 1 month Nila Gonzalez MD [address], [address], [employer] in arm    In 3 months Viviana Haney MD [address], [address], [place] 6 mo f/u    In 10 months [employer], [address], [address], [facility] Colon SCRN @ [address] [facility]          Recent Outpatient Visits              2 weeks ago Gastroesophageal reflux disease, unspecified whether esophagitis present    [address] [place], [place] [address] Lanis Mcburney, APRN    Office Visit    1 month ago Right shoulder pain, unspecified chronicity    [address] [facility] Benito Pham MD    Office Visit    2 months ago Medication monitoring encounter    [address], [address], Birdie Garcia MD    Office Visit    3 months ago Type 2 diabetes mellitus with hyperglycemia, without long-term current use of insulin [facility])    [address] [facility], Rachel Fofana MD    Office Visit    8 months ago Acute idiopathic gout of left foot    [address], [address], Aniya Esqueda MD    Office Visit

## 2024-01-01 RX ORDER — DULAGLUTIDE 0.75 MG/.5ML
0.75 INJECTION, SOLUTION SUBCUTANEOUS WEEKLY
Qty: 12 ML | Refills: 0 | Status: SHIPPED | OUTPATIENT
Start: 2024-01-01

## 2024-01-31 ENCOUNTER — TELEPHONE (OUTPATIENT)
Facility: CLINIC | Age: 64
End: 2024-01-31

## 2024-01-31 ENCOUNTER — OFFICE VISIT (OUTPATIENT)
Dept: ORTHOPEDICS CLINIC | Facility: CLINIC | Age: 64
End: 2024-01-31
Payer: COMMERCIAL

## 2024-01-31 VITALS — SYSTOLIC BLOOD PRESSURE: 146 MMHG | HEART RATE: 82 BPM | DIASTOLIC BLOOD PRESSURE: 84 MMHG

## 2024-01-31 DIAGNOSIS — M67.911 DISORDER OF RIGHT ROTATOR CUFF: ICD-10-CM

## 2024-01-31 DIAGNOSIS — M89.9 HUMERUS LESION, RIGHT: Primary | ICD-10-CM

## 2024-01-31 PROCEDURE — 3079F DIAST BP 80-89 MM HG: CPT | Performed by: ORTHOPAEDIC SURGERY

## 2024-01-31 PROCEDURE — 20610 DRAIN/INJ JOINT/BURSA W/O US: CPT | Performed by: ORTHOPAEDIC SURGERY

## 2024-01-31 PROCEDURE — 3077F SYST BP >= 140 MM HG: CPT | Performed by: ORTHOPAEDIC SURGERY

## 2024-01-31 PROCEDURE — 99214 OFFICE O/P EST MOD 30 MIN: CPT | Performed by: ORTHOPAEDIC SURGERY

## 2024-01-31 RX ORDER — TRIAMCINOLONE ACETONIDE 40 MG/ML
40 INJECTION, SUSPENSION INTRA-ARTICULAR; INTRAMUSCULAR ONCE
Status: COMPLETED | OUTPATIENT
Start: 2024-01-31 | End: 2024-01-31

## 2024-01-31 RX ADMIN — TRIAMCINOLONE ACETONIDE 40 MG: 40 INJECTION, SUSPENSION INTRA-ARTICULAR; INTRAMUSCULAR at 17:28:00

## 2024-01-31 NOTE — PROGRESS NOTES
Per verbal order from Dr. Caballero, draw up and 4ml of 0.5% Marcaine and 1ml of Kenalog 40 for injection into right shoulder. Sunitha QUICK MA  Patient provided education handout for cortisone injection.

## 2024-01-31 NOTE — TELEPHONE ENCOUNTER
Current Outpatient Medications   Medication Sig Dispense Refill    pantoprazole 40 MG Oral Tab EC Take 1 tablet (40 mg total) by mouth before breakfast. 90 tablet 3         Key: [id number]
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
## 2024-02-01 NOTE — TELEPHONE ENCOUNTER
Medication PA Requested: Pantoprazole 40mg oral tab                                                        CoverMyMeds Used:  Key:  Quantity: 90  Day Supply:   Sig:  Take 1 tablet (40 mg total) by mouth before breakfast   DX Code:  K21.9                                 CPT code (if applicable):   Case Number/Pending Ref#:

## 2024-02-02 NOTE — TELEPHONE ENCOUNTER
Called [facility] [address] at [phone], spoke with Ernie,  patient picked up 3 month supply on 12/12/23. Next refill will be Feb 18th, too soon to refill now.    Message to GI, please notify patient

## 2024-02-06 NOTE — TELEPHONE ENCOUNTER
CRISPIN JACKSON, informed pt that a aDealio message was sent as well. GI office number provided for any questions.

## 2024-02-07 NOTE — TELEPHONE ENCOUNTER
RN called and spoke to pt, informed her that she should be able to get med after 2/18/24 without any issue. Instructed her to contact office if she has any difficulty getting med. Pt verbalized understanding. Pt also states that she does not use her MyChart and was appreciative of call.

## 2024-02-26 ENCOUNTER — HOSPITAL ENCOUNTER (OUTPATIENT)
Dept: CT IMAGING | Facility: HOSPITAL | Age: 64
Discharge: HOME | End: 2024-02-26
Attending: ORTHOPAEDIC SURGERY
Payer: COMMERCIAL

## 2024-02-26 DIAGNOSIS — M89.9 HUMERUS LESION, RIGHT: ICD-10-CM

## 2024-02-26 LAB
CREAT BLD-MCNC: 0.7 MG/DL
EGFRCR SERPLBLD CKD-EPI 2021: 98 ML/MIN/1.73M2 (ref 60–?)

## 2024-02-26 PROCEDURE — 73201 CT UPPER EXTREMITY W/DYE: CPT | Performed by: ORTHOPAEDIC SURGERY

## 2024-02-26 PROCEDURE — 82565 ASSAY OF CREATININE: CPT

## 2024-03-14 ENCOUNTER — TELEPHONE (OUTPATIENT)
Facility: CLINIC | Age: 64
End: 2024-03-14

## 2024-03-14 DIAGNOSIS — Z12.11 COLON CANCER SCREENING: Primary | ICD-10-CM

## 2024-03-14 RX ORDER — ROSUVASTATIN CALCIUM 20 MG/1
20 TABLET, COATED ORAL NIGHTLY
Qty: 90 TABLET | Refills: 0 | Status: SHIPPED | OUTPATIENT
Start: 2024-03-14

## 2024-03-14 NOTE — TELEPHONE ENCOUNTER
RN submitted PA for pantoprazole via covermymeds. PA approved. RN LM informing pt that pantoprazole was approved by insurance. GI office number provided.

## 2024-03-14 NOTE — TELEPHONE ENCOUNTER
Scheduled for:  Colonoscopy [id number]  Provider Name:  Dr. La   Date:  11/12/2024  Location:[facility]  Sedation:  MAC  Time: 11;15AM (Patient is aware arrival time is at 10:15AM)  Prep:  Trilyte Prep Instructions Given At The Office Visit.    Meds/Allergies Reconciled?:  ALVIN Tyler Reviewed  Diagnosis with codes:  Colon Screening Z12.11  Was patient informed to call insurance with codes (Y/N):  Yes  Referral sent?:  Referral was sent at the time of electronic surgical scheduling.  [facility] or [facility] notified?:  I sent an electronic request to Endo Scheduling and received a confirmation today.  Medication Orders: Patient is aware   Hold trulicity 7 days prior to c-scope  Hold metformin day before and am of procedurePt is aware to NOT take iron pills, herbal meds and diet supplements for 7 days before exam. Also to NOT take any form of alcohol, recreational drugs and any forms of ED meds 24 hours before exam.   Misc Orders:       Further instructions given by staff:  I provide prep instructions to patient at the time of the appointment and reviewed date, time and location, she verbalized that she understood and is aware to call if she has any questions.    Patient was informed about the new cancellation policy for his/her procedure. Patient was also given a copy of the cancellation policy at the time of the appointment and verbalized understanding.

## 2024-03-14 NOTE — TELEPHONE ENCOUNTER
Current Outpatient Medications    pantoprazole 40 MG Oral Tab EC Take 1 tablet (40 mg total) by mouth before breakfast. 90 tablet 3

## 2024-03-14 NOTE — TELEPHONE ENCOUNTER
Please review. Protocol failed or has no protocol.    Requested Prescriptions   Pending Prescriptions Disp Refills    hydroCHLOROthiazide 12.5 MG Oral Cap [Pharmacy Med Name: hydroCHLOROthiazide 12.5 MG Oral Capsule] 90 capsule 0     Sig: Take 1 capsule (12.5 mg total) by mouth daily.       Hypertension Medications Protocol Failed - 3/13/2024  6:24 PM        Failed - Last BP reading less than 140/90     BP Readings from Last 1 Encounters:   01/31/24 146/84               Passed - CMP or BMP in past 12 months        Passed - In person appointment or virtual visit in the past 12 mos or appointment in next 3 mos     Recent Outpatient Visits              1 month ago [facility]umerus lesion, right    [facility]    [facility]    3 months ago Gastroesophageal reflux disease, unspecified whether esophagitis present    [facility]    [facility] Visit    4 months ago Right shoulder pain, unspecified chronicity    [facility]    Office Visit    5 months ago Medication monitoring encounter    [facility] Florencia Dent MD    Office Visit    5 months ago Type 2 diabetes mellitus with hyperglycemia, without long-term current use of insulin ([facility])    [facility] Sam Renteria MD    Office Visit          Future Appointments         Provider Department Appt Notes    In 2 weeks Sam Renteria MD [facility] Diabetes    In 3 weeks Florencia Dent MD [facility] 6 mo f/u    In 8 months BETY PATEL [facility] Colon SCRN @ [facility]               Passed - EGFRCR or GFRAA > 50     GFR Evaluation  EGFRCR: 98 , resulted on 2/26/2024             losartan 50 MG Oral Tab [Pharmacy Med Name: Losartan Potassium 50 MG Oral Tablet] 90 tablet 0     Sig: Take 1 tablet (50 mg total) by mouth daily.       Hypertension Medications Protocol Failed - 3/13/2024  6:24 PM        Failed - Last BP reading less than 140/90     BP Readings from Last 1 Encounters:   01/31/24 146/84               Passed - CMP or BMP in past 12 months        Passed - In person appointment or virtual visit in the past 12 mos or appointment in next 3 mos     Recent Outpatient Visits              1 month ago [facility]us lesion, right    [facility]    [facility]    3 months ago Gastroesophageal reflux disease, unspecified whether esophagitis present    [facility] [place] [facility]    Office Visit    4 months ago Right shoulder pain, unspecified chronicity    [facility] Pen[facility]    Office Visit    5 months ago Medication monitoring encounter    [facility] Florencia Dent MD    Office Visit    5 months ago Type 2 diabetes mellitus with hyperglycemia, without long-term current use of insulin (HCC)    [facility] Sam Renteria MD    Office Visit          Future Appointments         Provider Department Appt Notes    In 2 weeks Sam Renteria MD [facility] Diabetes    In 3 weeks Florencia Dent MD [facility] 6 mo f/u    In 8 months BETY PATEL [facility] @ [facility]               Passed - EGFRCR or GFRAA > 50     GFR Evaluation  EGFRCR: 98 , resulted on 2/26/2024           Signed Prescriptions Disp Refills    rosuvastatin 20 MG Oral Tab 90 tablet 0     Sig: Take 1 tablet (20 mg total) by mouth nightly.        Cholesterol Medication Protocol Passed - 3/13/2024  6:24 PM        Passed - ALT < 80     Lab Results   Component Value Date    ALT 34 09/21/2023             Passed - ALT resulted within past year        Passed - Lipid panel within past 12 months     Lab Results   Component Value Date    CHOLEST 235 (H) 09/21/2023    TRIG 231 (H) 09/21/2023    HDL 61 (H) 09/21/2023     (H) 09/21/2023    VLDL 42 (H) 09/21/2023    NONHDLC 174 (H) 09/21/2023             Passed - In person appointment or virtual visit in the past 12 mos or appointment in next 3 mos     Recent Outpatient Visits              1 month ago [facility]umerus lesion, right    [facility]    [facility]    3 months ago Gastroesophageal reflux disease, unspecified whether esophagitis present    [facility] [place] [facility]    Office Visit    4 months ago Right shoulder pain, unspecified chronicity    [facility]    [facility] Visit    5 months ago Medication monitoring encounter    [facility] [place] [facility]    Office Visit    5 months ago Type 2 diabetes mellitus with hyperglycemia, without long-term current use of insulin (HCC)    [facility] Sam Renteria MD    Office Visit          Future Appointments         Provider Department Appt Notes    In 2 weeks Sam Renteria MD [facility] Diabetes    In 3 weeks [facility]    In 8 months [facility]                    Recent Outpatient Visits              1 month ago Humerus lesion, right    [facility]    Office Visit    3 months ago Gastroesophageal reflux disease, unspecified whether esophagitis present    [facility]    Office Visit    4 months ago Right shoulder pain, unspecified chronicity    [facility] Hard[facility]ep Portillo MD    Office Visit    5 months ago Medication monitoring encounter    [facility] Florencia Dent MD    Office Visit    5 months ago Type 2 diabetes mellitus with hyperglycemia, without long-term current use of insulin (HCC)    [facility] Sam Renteria MD    Office Visit            Future Appointments         Provider Department Appt Notes    In 2 weeks Sam Renteria MD [facility] Diabetes    In 3 weeks Florencia Dent MD [facility] 6 mo f/u    In 8 months BETY PATEL [facility] Colon SCRN @ [facility]

## 2024-03-14 NOTE — TELEPHONE ENCOUNTER
Refill passed per [facility] protocol.   Requested Prescriptions   Pending Prescriptions Disp Refills    HYDROCHLOROTHIAZIDE 12.5 MG Oral Cap [Pharmacy Med Name: hydroCHLOROthiazide 12.5 MG Oral Capsule] 90 capsule 0     Sig: Take 1 capsule by mouth once daily       Hypertension Medications Protocol Failed - 3/13/2024  6:24 PM        Failed - Last BP reading less than 140/90     BP Readings from Last 1 Encounters:   01/31/24 146/84               Passed - CMP or BMP in past 12 months        Passed - In person appointment or virtual visit in the past 12 mos or appointment in next 3 mos     Recent Outpatient Visits              1 month ago [facility]umerus lesion, right    [facility]    [facility]    3 months ago Gastroesophageal reflux disease, unspecified whether esophagitis present    [facility]    Office Visit    4 months ago Right shoulder pain, unspecified chronicity    [facility]    Office Visit    5 months ago Medication monitoring encounter    [facility] Florencia Dent MD    Office Visit    5 months ago Type 2 diabetes mellitus with hyperglycemia, without long-term current use of insulin (HCC)    [facility] Sam Renteria MD    Office Visit          Future Appointments         Provider Department Appt Notes    In 2 weeks Sam Renteria MD [facility] Diabetes    In 3 weeks Florencia Dent MD [facility] 6 mo f/u    In 8 months BETY PATEL [facility] Colon SCRN @ [facility]               Passed - EGFRCR or GFRAA > 50     GFR Evaluation  EGFRCR: 98 , resulted on 2/26/2024            LOSARTAN 50 MG  Oral Tab [Pharmacy Med Name: Losartan Potassium 50 MG Oral Tablet] 90 tablet 0     Sig: Take 1 tablet by mouth once daily       Hypertension Medications Protocol Failed - 3/13/2024  6:24 PM        Failed - Last BP reading less than 140/90     BP Readings from Last 1 Encounters:   01/31/24 146/84               Passed - CMP or BMP in past 12 months        Passed - In person appointment or virtual visit in the past 12 mos or appointment in next 3 mos     Recent Outpatient Visits              1 month ago Humerus lesion, right    [facility] [place] [facility]    Office Visit    3 months ago Gastroesophageal reflux disease, unspecified whether esophagitis present    [facility]    Office Visit    4 months ago Right shoulder pain, unspecified chronicity    [facility] [place] [facility] [facility]    Office Visit    5 months ago Medication monitoring encounter    [facility] Florencia Dent MD    Office Visit    5 months ago Type 2 diabetes mellitus with hyperglycemia, without long-term current use of insulin (HCC)    [facility] Sam Renteria MD    Office Visit          Future Appointments         Provider Department Appt Notes    In 2 weeks Sam Renteria MD [facility] Diabetes    In 3 weeks Florencia Dent MD [facility] 6 mo f/u    In 8 months BETY PATEL [facility] Colon SCRN @ [facility]               Passed - EGFRCR or GFRAA > 50     GFR Evaluation  EGFRCR: 98 , resulted on 2/26/2024            ROSUVASTATIN 20 MG Oral Tab [Pharmacy Med Name: Rosuvastatin Calcium 20 MG Oral Tablet] 90 tablet 0     Sig: Take 1 tablet by mouth nightly       Cholesterol  Medication Protocol Passed - 3/13/2024  6:24 PM        Passed - ALT < 80     Lab Results   Component Value Date    ALT 34 09/21/2023             Passed - ALT resulted within past year        Passed - Lipid panel within past 12 months     Lab Results   Component Value Date    CHOLEST 235 (H) 09/21/2023    TRIG 231 (H) 09/21/2023    HDL 61 (H) 09/21/2023     (H) 09/21/2023    VLDL 42 (H) 09/21/2023    NONHDLC 174 (H) 09/21/2023             Passed - In person appointment or virtual visit in the past 12 mos or appointment in next 3 mos     Recent Outpatient Visits              1 month ago [facility]umerus lesion, right    [facility]    [facility] Visit    3 months ago Gastroesophageal reflux disease, unspecified whether esophagitis present    [facility] [place]    Office Visit    4 months ago Right shoulder pain, unspecified chronicity    [facility]    Office Visit    5 months ago Medication monitoring encounter    [facility] [place] [facility]    Office Visit    5 months ago Type 2 diabetes mellitus with hyperglycemia, without long-term current use of insulin (HCC)    [facility] Sam Renteria MD    Office Visit          Future Appointments         Provider Department Appt Notes    In 2 weeks Sam Renteria MD [facility] Diabetes    In 3 weeks [facility] Dent [facility]    In 8 months [facility]Y PATEL [facility]                    Recent Outpatient Visits              1 month ago Humerus lesion, right    [facility]     Office Visit    3 months ago Gastroesophageal reflux disease, unspecified whether esophagitis present    [facility]    Office Visit    4 months ago Right shoulder pain, unspecified chronicity    [facility] Hardeep Portillo MD    Office Visit    5 months ago Medication monitoring encounter    [facility] Florencia Dent MD    Office Visit    5 months ago Type 2 diabetes mellitus with hyperglycemia, without long-term current use of insulin (HCC)    [facility] Sam Renteria MD    Office Visit            Future Appointments         Provider Department Appt Notes    In 2 weeks Sam Renteria MD [facility] Diabetes    In 3 weeks Florencia Dent MD [facility] 6 mo f/u    In 8 months BETY PATEL [facility] Colon SCRN @ [facility]

## 2024-03-15 RX ORDER — HYDROCHLOROTHIAZIDE 12.5 MG/1
12.5 CAPSULE, GELATIN COATED ORAL DAILY
Qty: 90 CAPSULE | Refills: 1 | Status: SHIPPED | OUTPATIENT
Start: 2024-03-15

## 2024-03-15 RX ORDER — LOSARTAN POTASSIUM 50 MG/1
50 TABLET ORAL DAILY
Qty: 90 TABLET | Refills: 1 | Status: SHIPPED | OUTPATIENT
Start: 2024-03-15

## 2024-03-22 RX ORDER — DULAGLUTIDE 0.75 MG/.5ML
0.75 INJECTION, SOLUTION SUBCUTANEOUS WEEKLY
Qty: 12 ML | Refills: 0 | Status: SHIPPED | OUTPATIENT
Start: 2024-03-22

## 2024-03-22 NOTE — TELEPHONE ENCOUNTER
Called [facility] pharmacy and spoke with pharmacy technician and pharmacist. Clarified that the Trulicity 0.75mg  was filled as 12 syringes - 3 boxes/ month supply, not as 12 mL which would have been 6 boxes/6 month supply. Patient is due for a refill.

## 2024-03-22 NOTE — TELEPHONE ENCOUNTER
Please review. Rx failed/no protocol.    No Active/ Future labs pended    Future Appointments   Date Time Provider Department Center   3/29/2024  8:30 AM Sam Renteria MD [facility]       Requested Prescriptions   Pending Prescriptions Disp Refills    TRULICITY 0.75 MG/0.5ML Subcutaneous Solution Pen-injector [Pharmacy Med Name: Trulicity 0.75 MG/0.5ML Subcutaneous Solution Pen-injector] 12 mL 0     Sig: INJECT 0.75MG INTO THE SKIN ONCE WEEKLY       Diabetes Medication Protocol Failed - 3/21/2024 10:22 AM        Failed - Last A1C < 7.5 and within past 6 months     Lab Results   Component Value Date    A1C 7.3 (A) 09/21/2023             Passed - In person appointment or virtual visit in the past 6 mos or appointment in next 3 mos     Recent Outpatient Visits              1 month ago Humerus lesion, right    [facility]    Office Visit    3 months ago Gastroesophageal reflux disease, unspecified whether esophagitis present    [facility]    Office Visit    4 months ago Right shoulder pain, unspecified chronicity    [facility] Hardeep Portillo MD    Office Visit    5 months ago Medication monitoring encounter    [facility]    Office Visit    6 months ago Type 2 diabetes mellitus with hyperglycemia, without long-term current use of insulin (HCC)    [facility] Sam Renteria MD    Office Visit          Future Appointments         Provider Department Appt Notes    In 1 week Sam Renteria MD [facility] Diabetes    In 2 weeks Florencia Dent MD [facility] 6 mo f/u    In 7 months BETY PATEL [facility]  [place] Colon SCRN @ [facility]               Passed - Microalbumin procedure in past 12 months or taking ACE/ARB        Passed - EGFRCR or GFRAA > 50     GFR Evaluation  EGFRCR: 98 , resulted on 2/26/2024          Passed - GFR in the past 12 months             Future Appointments         Provider Department Appt Notes    In 1 week Sam Renteria MD [facility] Diabetes    In 2 weeks Florencia Dent MD [facility] 6 mo f/u    In 7 months [facility]          Recent Outpatient Visits              1 month ago Humerus lesion, right    [facility] Portillo [facility]    Office Visit    3 months ago Gastroesophageal reflux disease, unspecified whether esophagitis present    [facility] [place] [facility]    Office Visit    4 months ago Right shoulder pain, unspecified chronicity    [facility]    Office Visit    5 months ago Medication monitoring encounter    [facility] [place] [facility]    Office Visit    6 months ago Type 2 diabetes mellitus with hyperglycemia, without long-term current use of insulin (HCC)    [facility] Sam Renteria MD    Office Visit

## 2024-03-29 ENCOUNTER — LAB ENCOUNTER (OUTPATIENT)
Dept: LAB | Age: 64
End: 2024-03-29
Attending: INTERNAL MEDICINE
Payer: COMMERCIAL

## 2024-03-29 ENCOUNTER — OFFICE VISIT (OUTPATIENT)
Dept: INTERNAL MEDICINE CLINIC | Facility: CLINIC | Age: 64
End: 2024-03-29
Payer: COMMERCIAL

## 2024-03-29 VITALS
TEMPERATURE: 98 F | HEIGHT: 66 IN | WEIGHT: 224.5 LBS | DIASTOLIC BLOOD PRESSURE: 77 MMHG | OXYGEN SATURATION: 97 % | SYSTOLIC BLOOD PRESSURE: 120 MMHG | BODY MASS INDEX: 36.08 KG/M2 | HEART RATE: 81 BPM

## 2024-03-29 DIAGNOSIS — Z00.00 ANNUAL PHYSICAL EXAM: ICD-10-CM

## 2024-03-29 DIAGNOSIS — Z87.39 HISTORY OF GOUT: ICD-10-CM

## 2024-03-29 DIAGNOSIS — M25.472 LEFT ANKLE SWELLING: ICD-10-CM

## 2024-03-29 DIAGNOSIS — E78.2 MIXED HYPERLIPIDEMIA: ICD-10-CM

## 2024-03-29 DIAGNOSIS — E11.65 TYPE 2 DIABETES MELLITUS WITH HYPERGLYCEMIA, WITHOUT LONG-TERM CURRENT USE OF INSULIN (HCC): ICD-10-CM

## 2024-03-29 DIAGNOSIS — Z12.11 COLON CANCER SCREENING: ICD-10-CM

## 2024-03-29 DIAGNOSIS — Z00.00 ANNUAL PHYSICAL EXAM: Primary | ICD-10-CM

## 2024-03-29 DIAGNOSIS — I10 ESSENTIAL HYPERTENSION: ICD-10-CM

## 2024-03-29 DIAGNOSIS — Z01.00 DIABETIC EYE EXAM (HCC): ICD-10-CM

## 2024-03-29 DIAGNOSIS — M10.072 ACUTE IDIOPATHIC GOUT OF LEFT FOOT: ICD-10-CM

## 2024-03-29 DIAGNOSIS — Z01.419 WELL FEMALE EXAM WITH ROUTINE GYNECOLOGICAL EXAM: ICD-10-CM

## 2024-03-29 DIAGNOSIS — E11.9 DIABETIC EYE EXAM (HCC): ICD-10-CM

## 2024-03-29 DIAGNOSIS — Z12.31 ENCOUNTER FOR SCREENING MAMMOGRAM FOR BREAST CANCER: ICD-10-CM

## 2024-03-29 LAB
ALBUMIN SERPL-MCNC: 4.7 G/DL (ref 3.2–4.8)
ALBUMIN/GLOB SERPL: 1.7 {RATIO} (ref 1–2)
ALP LIVER SERPL-CCNC: 98 U/L
ALT SERPL-CCNC: 21 U/L
ANION GAP SERPL CALC-SCNC: 8 MMOL/L (ref 0–18)
AST SERPL-CCNC: 20 U/L (ref ?–34)
BASOPHILS # BLD AUTO: 0.08 X10(3) UL (ref 0–0.2)
BASOPHILS NFR BLD AUTO: 1.1 %
BILIRUB SERPL-MCNC: 0.2 MG/DL (ref 0.2–1.1)
BUN BLD-MCNC: 11 MG/DL (ref 9–23)
BUN/CREAT SERPL: 14.3 (ref 10–20)
CALCIUM BLD-MCNC: 10.1 MG/DL (ref 8.7–10.4)
CHLORIDE SERPL-SCNC: 104 MMOL/L (ref 98–112)
CHOLEST SERPL-MCNC: 193 MG/DL (ref ?–200)
CO2 SERPL-SCNC: 27 MMOL/L (ref 21–32)
CREAT BLD-MCNC: 0.77 MG/DL
CREAT UR-SCNC: 78.5 MG/DL
DEPRECATED RDW RBC AUTO: 44.2 FL (ref 35.1–46.3)
EGFRCR SERPLBLD CKD-EPI 2021: 87 ML/MIN/1.73M2 (ref 60–?)
EOSINOPHIL # BLD AUTO: 0.2 X10(3) UL (ref 0–0.7)
EOSINOPHIL NFR BLD AUTO: 2.7 %
ERYTHROCYTE [DISTWIDTH] IN BLOOD BY AUTOMATED COUNT: 13.2 % (ref 11–15)
EST. AVERAGE GLUCOSE BLD GHB EST-MCNC: 140 MG/DL (ref 68–126)
FASTING PATIENT LIPID ANSWER: YES
FASTING STATUS PATIENT QL REPORTED: YES
GLOBULIN PLAS-MCNC: 2.8 G/DL (ref 2.8–4.4)
GLUCOSE BLD-MCNC: 119 MG/DL (ref 70–99)
HBA1C MFR BLD: 6.5 % (ref ?–5.7)
HCT VFR BLD AUTO: 43.9 %
HDLC SERPL-MCNC: 53 MG/DL (ref 40–59)
HGB BLD-MCNC: 14.1 G/DL
IMM GRANULOCYTES # BLD AUTO: 0.04 X10(3) UL (ref 0–1)
IMM GRANULOCYTES NFR BLD: 0.5 %
LDLC SERPL CALC-MCNC: 109 MG/DL (ref ?–100)
LYMPHOCYTES # BLD AUTO: 2.71 X10(3) UL (ref 1–4)
LYMPHOCYTES NFR BLD AUTO: 36.7 %
MCH RBC QN AUTO: 29 PG (ref 26–34)
MCHC RBC AUTO-ENTMCNC: 32.1 G/DL (ref 31–37)
MCV RBC AUTO: 90.3 FL
MICROALBUMIN UR-MCNC: <0.3 MG/DL
MONOCYTES # BLD AUTO: 0.46 X10(3) UL (ref 0.1–1)
MONOCYTES NFR BLD AUTO: 6.2 %
NEUTROPHILS # BLD AUTO: 3.9 X10 (3) UL (ref 1.5–7.7)
NEUTROPHILS # BLD AUTO: 3.9 X10(3) UL (ref 1.5–7.7)
NEUTROPHILS NFR BLD AUTO: 52.8 %
NONHDLC SERPL-MCNC: 140 MG/DL (ref ?–130)
OSMOLALITY SERPL CALC.SUM OF ELEC: 289 MOSM/KG (ref 275–295)
PLATELET # BLD AUTO: 246 10(3)UL (ref 150–450)
POTASSIUM SERPL-SCNC: 4 MMOL/L (ref 3.5–5.1)
PROT SERPL-MCNC: 7.5 G/DL (ref 5.7–8.2)
RBC # BLD AUTO: 4.86 X10(6)UL
SODIUM SERPL-SCNC: 139 MMOL/L (ref 136–145)
TRIGL SERPL-MCNC: 179 MG/DL (ref 30–149)
URATE SERPL-MCNC: 5.8 MG/DL
VLDLC SERPL CALC-MCNC: 31 MG/DL (ref 0–30)
WBC # BLD AUTO: 7.4 X10(3) UL (ref 4–11)

## 2024-03-29 PROCEDURE — 3078F DIAST BP <80 MM HG: CPT | Performed by: INTERNAL MEDICINE

## 2024-03-29 PROCEDURE — 80061 LIPID PANEL: CPT

## 2024-03-29 PROCEDURE — 3008F BODY MASS INDEX DOCD: CPT | Performed by: INTERNAL MEDICINE

## 2024-03-29 PROCEDURE — 36415 COLL VENOUS BLD VENIPUNCTURE: CPT

## 2024-03-29 PROCEDURE — 3074F SYST BP LT 130 MM HG: CPT | Performed by: INTERNAL MEDICINE

## 2024-03-29 PROCEDURE — 85025 COMPLETE CBC W/AUTO DIFF WBC: CPT

## 2024-03-29 PROCEDURE — 82043 UR ALBUMIN QUANTITATIVE: CPT

## 2024-03-29 PROCEDURE — 82570 ASSAY OF URINE CREATININE: CPT

## 2024-03-29 PROCEDURE — 84550 ASSAY OF BLOOD/URIC ACID: CPT

## 2024-03-29 PROCEDURE — 80053 COMPREHEN METABOLIC PANEL: CPT

## 2024-03-29 PROCEDURE — 99396 PREV VISIT EST AGE 40-64: CPT | Performed by: INTERNAL MEDICINE

## 2024-03-29 PROCEDURE — 83036 HEMOGLOBIN GLYCOSYLATED A1C: CPT
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
## 2024-04-08 ENCOUNTER — OFFICE VISIT (OUTPATIENT)
Dept: RHEUMATOLOGY | Facility: CLINIC | Age: 64
End: 2024-04-08
Payer: COMMERCIAL

## 2024-04-08 VITALS
SYSTOLIC BLOOD PRESSURE: 152 MMHG | BODY MASS INDEX: 36 KG/M2 | DIASTOLIC BLOOD PRESSURE: 81 MMHG | HEIGHT: 66 IN | HEART RATE: 67 BPM | WEIGHT: 224 LBS

## 2024-04-08 DIAGNOSIS — M25.472 LEFT ANKLE SWELLING: ICD-10-CM

## 2024-04-08 DIAGNOSIS — Z51.81 MEDICATION MONITORING ENCOUNTER: ICD-10-CM

## 2024-04-08 DIAGNOSIS — M10.072 ACUTE IDIOPATHIC GOUT OF LEFT FOOT: Primary | ICD-10-CM

## 2024-04-08 PROCEDURE — 3079F DIAST BP 80-89 MM HG: CPT | Performed by: INTERNAL MEDICINE

## 2024-04-08 PROCEDURE — 99214 OFFICE O/P EST MOD 30 MIN: CPT | Performed by: INTERNAL MEDICINE

## 2024-04-08 PROCEDURE — 3077F SYST BP >= 140 MM HG: CPT | Performed by: INTERNAL MEDICINE

## 2024-04-08 PROCEDURE — 3008F BODY MASS INDEX DOCD: CPT | Performed by: INTERNAL MEDICINE

## 2024-04-08 RX ORDER — ALLOPURINOL 100 MG/1
100 TABLET ORAL DAILY
Qty: 90 TABLET | Refills: 3 | Status: SHIPPED | OUTPATIENT
Start: 2024-04-08
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
## 2024-06-03 ENCOUNTER — PATIENT MESSAGE (OUTPATIENT)
Dept: INTERNAL MEDICINE CLINIC | Facility: CLINIC | Age: 64
End: 2024-06-03

## 2024-06-05 NOTE — TELEPHONE ENCOUNTER
From: Pedro Vieyra  To: Sam Renteria  Sent: 6/3/2024 10:59 PM CDT  Subject: mammogram     HI, i am requesting a referral for a mammogram exam, you may have given me one before but i dont see it in mychart.

## 2024-06-13 ENCOUNTER — LAB ENCOUNTER (OUTPATIENT)
Dept: LAB | Age: 64
End: 2024-06-13
Attending: INTERNAL MEDICINE
Payer: COMMERCIAL

## 2024-06-13 DIAGNOSIS — Z51.81 MEDICATION MONITORING ENCOUNTER: ICD-10-CM

## 2024-06-13 DIAGNOSIS — M10.072 ACUTE IDIOPATHIC GOUT OF LEFT FOOT: ICD-10-CM

## 2024-06-13 LAB
ALBUMIN SERPL-MCNC: 4.5 G/DL (ref 3.2–4.8)
ALBUMIN/GLOB SERPL: 1.6 {RATIO} (ref 1–2)
ALP LIVER SERPL-CCNC: 84 U/L
ALT SERPL-CCNC: 23 U/L
ANION GAP SERPL CALC-SCNC: 5 MMOL/L (ref 0–18)
AST SERPL-CCNC: 26 U/L (ref ?–34)
BILIRUB SERPL-MCNC: 0.4 MG/DL (ref 0.2–1.1)
BUN BLD-MCNC: 9 MG/DL (ref 9–23)
BUN/CREAT SERPL: 12.9 (ref 10–20)
CALCIUM BLD-MCNC: 9.4 MG/DL (ref 8.7–10.4)
CHLORIDE SERPL-SCNC: 110 MMOL/L (ref 98–112)
CO2 SERPL-SCNC: 27 MMOL/L (ref 21–32)
CREAT BLD-MCNC: 0.7 MG/DL
EGFRCR SERPLBLD CKD-EPI 2021: 97 ML/MIN/1.73M2 (ref 60–?)
FASTING STATUS PATIENT QL REPORTED: YES
GLOBULIN PLAS-MCNC: 2.8 G/DL (ref 2–3.5)
GLUCOSE BLD-MCNC: 99 MG/DL (ref 70–99)
OSMOLALITY SERPL CALC.SUM OF ELEC: 293 MOSM/KG (ref 275–295)
POTASSIUM SERPL-SCNC: 3.4 MMOL/L (ref 3.5–5.1)
PROT SERPL-MCNC: 7.3 G/DL (ref 5.7–8.2)
SODIUM SERPL-SCNC: 142 MMOL/L (ref 136–145)
URATE SERPL-MCNC: 6.2 MG/DL

## 2024-06-13 PROCEDURE — 84550 ASSAY OF BLOOD/URIC ACID: CPT

## 2024-06-13 PROCEDURE — 36415 COLL VENOUS BLD VENIPUNCTURE: CPT

## 2024-06-13 PROCEDURE — 80053 COMPREHEN METABOLIC PANEL: CPT

## 2024-07-08 RX ORDER — DULAGLUTIDE 0.75 MG/.5ML
0.75 INJECTION, SOLUTION SUBCUTANEOUS WEEKLY
Qty: 12 ML | Refills: 0 | Status: SHIPPED | OUTPATIENT
Start: 2024-07-08

## 2024-07-08 NOTE — TELEPHONE ENCOUNTER
REFILL PASSED PER [facility] PROTOCOLS    Requested Prescriptions   Pending Prescriptions Disp Refills    TRULICITY 0.75 MG/0.5ML Subcutaneous Solution Pen-injector [Pharmacy Med Name: Trulicity 0.75 MG/0.5ML Subcutaneous Solution Pen-injector] 12 mL 0     Sig: Inject 0.75 mg into the skin once a week.       Diabetes Medication Protocol Passed - 7/3/2024  9:08 AM        Passed - Last A1C < 7.5 and within past 6 months     Lab Results   Component Value Date    A1C 6.5 (H) 03/29/2024             Passed - In person appointment or virtual visit in the past 6 mos or appointment in next 3 mos     Recent Outpatient Visits              3 months ago Acute idiopathic gout of left foot    [facility] Fl[facility]    Office Visit    3 months ago Annual physical exam    [facility], Sam White MD    Office Visit    5 months ago [facility]us lesion, [facility] [place]    Office Visit    6 months ago Gastroesophageal reflux disease, unspecified whether esophagitis present    [facility]    [facility] Visit    7 months ago Right shoulder pain, unspecified chronicity    [facility]    [facility]          Future Appointments         Provider Department Appt Notes    Tomorrow LMB DEXA RM1; LMB GABO RM1 [facility] [place]     In 4 days Ruth Ann Mckenna MD [facility] - OB/GYN annual/last 11/16/21    In 4 months AMANDA, PROCEDURE [facility] @ [facility]    In 9 months Florencia Dent MD [facility] 6 mo f/u                    Passed - Microalbumin procedure in past 12 months or taking ACE/ARB         Passed - EGFRCR or GFRAA > 50     GFR Evaluation  EGFRCR: 97 , resulted on 6/13/2024          Passed - GFR in the past 12 months             Future Appointments         Provider Department Appt Notes    Tomorrow LMB DEXA RM1; LMB GABO RM1 [facility] - [place]     In 4 days Ruth Ann Mckenna MD [facility] - OB/GYN annual/last 11/16/21    In 4 months AMANDA, PROCEDURE [facility] Colon SCRN @ [facility]    In 9 months Florencia Dent MD [facility] 6 mo f/u          Recent Outpatient Visits              3 months ago Acute idiopathic gout of left foot    [facility] Florencia Dent MD    Office Visit    3 months ago Annual physical exam    [facility], Sam White MD    Office Visit    5 months ago [facility]umerus [facility], right    [facility]    Office Visit    6 months ago Gastroesophageal reflux disease, unspecified whether esophagitis present    [facility] [place] [facility]    Office Visit    7 months ago Right shoulder pain, unspecified chronicity    [facility]    [facility]

## 2024-07-09 ENCOUNTER — HOSPITAL ENCOUNTER (OUTPATIENT)
Dept: MAMMOGRAPHY | Age: 64
Discharge: HOME | End: 2024-07-09
Attending: INTERNAL MEDICINE
Payer: COMMERCIAL

## 2024-07-09 DIAGNOSIS — Z12.31 ENCOUNTER FOR SCREENING MAMMOGRAM FOR BREAST CANCER: ICD-10-CM

## 2024-07-09 PROCEDURE — 77063 BREAST TOMOSYNTHESIS BI: CPT | Performed by: INTERNAL MEDICINE

## 2024-07-09 PROCEDURE — 77067 SCR MAMMO BI INCL CAD: CPT | Performed by: INTERNAL MEDICINE

## 2024-07-12 ENCOUNTER — OFFICE VISIT (OUTPATIENT)
Dept: OBGYN CLINIC | Facility: CLINIC | Age: 64
End: 2024-07-12
Payer: COMMERCIAL

## 2024-07-12 VITALS
WEIGHT: 219 LBS | DIASTOLIC BLOOD PRESSURE: 84 MMHG | HEIGHT: 62.5 IN | BODY MASS INDEX: 39.29 KG/M2 | SYSTOLIC BLOOD PRESSURE: 153 MMHG | HEART RATE: 85 BPM

## 2024-07-12 DIAGNOSIS — R60.0 PEDAL EDEMA: ICD-10-CM

## 2024-07-12 DIAGNOSIS — Z01.411 ENCOUNTER FOR GYNECOLOGICAL EXAMINATION WITH ABNORMAL FINDING: Primary | ICD-10-CM

## 2024-07-12 PROCEDURE — 3077F SYST BP >= 140 MM HG: CPT | Performed by: OBSTETRICS & GYNECOLOGY

## 2024-07-12 PROCEDURE — 3079F DIAST BP 80-89 MM HG: CPT | Performed by: OBSTETRICS & GYNECOLOGY

## 2024-07-12 PROCEDURE — 3008F BODY MASS INDEX DOCD: CPT | Performed by: OBSTETRICS & GYNECOLOGY

## 2024-07-12 PROCEDURE — 99396 PREV VISIT EST AGE 40-64: CPT | Performed by: OBSTETRICS & GYNECOLOGY
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
## 2024-07-25 ENCOUNTER — HOSPITAL ENCOUNTER (OUTPATIENT)
Dept: MAMMOGRAPHY | Facility: HOSPITAL | Age: 64
Discharge: HOME | End: 2024-07-25
Attending: INTERNAL MEDICINE
Payer: COMMERCIAL

## 2024-07-25 ENCOUNTER — HOSPITAL ENCOUNTER (OUTPATIENT)
Dept: ULTRASOUND IMAGING | Facility: HOSPITAL | Age: 64
Discharge: HOME | End: 2024-07-25
Attending: INTERNAL MEDICINE
Payer: COMMERCIAL

## 2024-07-25 DIAGNOSIS — R92.8 ABNORMAL MAMMOGRAM: ICD-10-CM

## 2024-07-25 PROCEDURE — 76642 ULTRASOUND BREAST LIMITED: CPT | Performed by: INTERNAL MEDICINE

## 2024-07-25 PROCEDURE — 77061 BREAST TOMOSYNTHESIS UNI: CPT | Performed by: INTERNAL MEDICINE

## 2024-07-25 PROCEDURE — 77065 DX MAMMO INCL CAD UNI: CPT | Performed by: INTERNAL MEDICINE

## 2024-09-27 RX ORDER — DULAGLUTIDE 0.75 MG/.5ML
INJECTION, SOLUTION SUBCUTANEOUS
Qty: 12 ML | Refills: 0 | Status: SHIPPED | OUTPATIENT
Start: 2024-09-27

## 2024-09-28 NOTE — TELEPHONE ENCOUNTER
Protocol passed     LOV: 3/29/23   E11.65) Type 2 diabetes mellitus with hyperglycemia, without long-term current use of insulin (HCC)  Plan: Hemoglobin A1C, Microalb/Creat Ratio, Random         Urine        Will check her A1c and a UA.  Continue with her current diabetic medications including Trulicity.  RTC: nonen noted  Filled: 7/8/24 #12mL 0 refill  Labs: 3/29/24  Future Appointments   Date Time Provider Department Center   10/1/2024  1:30 PM Sam Renteria MD [facility]   11/12/2024 11:15 AM AMANDA, BETY [facility] None   4/11/2025  9:40 AM [facility]   7/18/2025  9:40 AM Ruth [facility]enna [facility]

## 2024-10-01 ENCOUNTER — HOSPITAL ENCOUNTER (OUTPATIENT)
Dept: GENERAL RADIOLOGY | Age: 64
Discharge: HOME | End: 2024-10-01
Attending: INTERNAL MEDICINE
Payer: COMMERCIAL

## 2024-10-01 ENCOUNTER — OFFICE VISIT (OUTPATIENT)
Dept: INTERNAL MEDICINE CLINIC | Facility: CLINIC | Age: 64
End: 2024-10-01
Payer: COMMERCIAL

## 2024-10-01 VITALS
SYSTOLIC BLOOD PRESSURE: 130 MMHG | BODY MASS INDEX: 40 KG/M2 | DIASTOLIC BLOOD PRESSURE: 70 MMHG | HEART RATE: 90 BPM | WEIGHT: 223.19 LBS

## 2024-10-01 DIAGNOSIS — E11.65 TYPE 2 DIABETES MELLITUS WITH HYPERGLYCEMIA, WITHOUT LONG-TERM CURRENT USE OF INSULIN (HCC): Primary | ICD-10-CM

## 2024-10-01 DIAGNOSIS — E78.2 MIXED HYPERLIPIDEMIA: ICD-10-CM

## 2024-10-01 DIAGNOSIS — I10 ESSENTIAL HYPERTENSION: ICD-10-CM

## 2024-10-01 DIAGNOSIS — M79.602 ARM PAIN, LEFT: ICD-10-CM

## 2024-10-01 LAB — HEMOGLOBIN A1C: 6.2 % (ref 4.3–5.6)

## 2024-10-01 PROCEDURE — 3078F DIAST BP <80 MM HG: CPT | Performed by: INTERNAL MEDICINE

## 2024-10-01 PROCEDURE — 90656 IIV3 VACC NO PRSV 0.5 ML IM: CPT | Performed by: INTERNAL MEDICINE

## 2024-10-01 PROCEDURE — 83036 HEMOGLOBIN GLYCOSYLATED A1C: CPT | Performed by: INTERNAL MEDICINE

## 2024-10-01 PROCEDURE — 3044F HG A1C LEVEL LT 7.0%: CPT | Performed by: INTERNAL MEDICINE

## 2024-10-01 PROCEDURE — 3075F SYST BP GE 130 - 139MM HG: CPT | Performed by: INTERNAL MEDICINE

## 2024-10-01 PROCEDURE — 3061F NEG MICROALBUMINURIA REV: CPT | Performed by: INTERNAL MEDICINE

## 2024-10-01 PROCEDURE — 73060 X-RAY EXAM OF HUMERUS: CPT | Performed by: INTERNAL MEDICINE

## 2024-10-01 PROCEDURE — 90471 IMMUNIZATION ADMIN: CPT | Performed by: INTERNAL MEDICINE

## 2024-10-01 PROCEDURE — 99214 OFFICE O/P EST MOD 30 MIN: CPT | Performed by: INTERNAL MEDICINE

## 2024-10-01 RX ORDER — MELOXICAM 15 MG/1
15 TABLET ORAL DAILY
Qty: 10 TABLET | Refills: 0 | Status: SHIPPED | OUTPATIENT
Start: 2024-10-01

## 2024-10-01 RX ORDER — DULAGLUTIDE 1.5 MG/.5ML
1.5 INJECTION, SOLUTION SUBCUTANEOUS WEEKLY
Qty: 12 EACH | Refills: 0 | Status: SHIPPED | OUTPATIENT
Start: 2024-10-01
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
## 2024-10-02 ENCOUNTER — LAB ENCOUNTER (OUTPATIENT)
Dept: LAB | Age: 64
End: 2024-10-02
Attending: INTERNAL MEDICINE
Payer: COMMERCIAL

## 2024-10-02 DIAGNOSIS — M10.072 ACUTE IDIOPATHIC GOUT OF LEFT FOOT: ICD-10-CM

## 2024-10-02 DIAGNOSIS — E11.65 TYPE 2 DIABETES MELLITUS WITH HYPERGLYCEMIA, WITHOUT LONG-TERM CURRENT USE OF INSULIN (HCC): ICD-10-CM

## 2024-10-02 DIAGNOSIS — E78.2 MIXED HYPERLIPIDEMIA: ICD-10-CM

## 2024-10-02 DIAGNOSIS — Z51.81 MEDICATION MONITORING ENCOUNTER: ICD-10-CM

## 2024-10-02 LAB
ALBUMIN SERPL-MCNC: 4.5 G/DL (ref 3.2–4.8)
ALBUMIN/GLOB SERPL: 1.5 {RATIO} (ref 1–2)
ALP LIVER SERPL-CCNC: 97 U/L
ALT SERPL-CCNC: 28 U/L
ANION GAP SERPL CALC-SCNC: 11 MMOL/L (ref 0–18)
AST SERPL-CCNC: 25 U/L (ref ?–34)
BILIRUB SERPL-MCNC: 0.6 MG/DL (ref 0.2–1.1)
BUN BLD-MCNC: 10 MG/DL (ref 9–23)
BUN/CREAT SERPL: 13.2 (ref 10–20)
CALCIUM BLD-MCNC: 9.5 MG/DL (ref 8.7–10.4)
CHLORIDE SERPL-SCNC: 106 MMOL/L (ref 98–112)
CHOLEST SERPL-MCNC: 199 MG/DL (ref ?–200)
CO2 SERPL-SCNC: 24 MMOL/L (ref 21–32)
CREAT BLD-MCNC: 0.76 MG/DL
CREAT UR-SCNC: 219.1 MG/DL
EGFRCR SERPLBLD CKD-EPI 2021: 88 ML/MIN/1.73M2 (ref 60–?)
FASTING PATIENT LIPID ANSWER: YES
FASTING STATUS PATIENT QL REPORTED: YES
GLOBULIN PLAS-MCNC: 3 G/DL (ref 2–3.5)
GLUCOSE BLD-MCNC: 126 MG/DL (ref 70–99)
HDLC SERPL-MCNC: 44 MG/DL (ref 40–59)
LDLC SERPL CALC-MCNC: 94 MG/DL (ref ?–100)
MICROALBUMIN UR-MCNC: 2.5 MG/DL
MICROALBUMIN/CREAT 24H UR-RTO: 11.4 UG/MG (ref ?–30)
NONHDLC SERPL-MCNC: 155 MG/DL (ref ?–130)
OSMOLALITY SERPL CALC.SUM OF ELEC: 293 MOSM/KG (ref 275–295)
POTASSIUM SERPL-SCNC: 3.6 MMOL/L (ref 3.5–5.1)
PROT SERPL-MCNC: 7.5 G/DL (ref 5.7–8.2)
SODIUM SERPL-SCNC: 141 MMOL/L (ref 136–145)
TRIGL SERPL-MCNC: 366 MG/DL (ref 30–149)
URATE SERPL-MCNC: 6.4 MG/DL
VLDLC SERPL CALC-MCNC: 61 MG/DL (ref 0–30)

## 2024-10-02 PROCEDURE — 80061 LIPID PANEL: CPT

## 2024-10-02 PROCEDURE — 80053 COMPREHEN METABOLIC PANEL: CPT

## 2024-10-02 PROCEDURE — 82570 ASSAY OF URINE CREATININE: CPT

## 2024-10-02 PROCEDURE — 82043 UR ALBUMIN QUANTITATIVE: CPT

## 2024-10-02 PROCEDURE — 84550 ASSAY OF BLOOD/URIC ACID: CPT

## 2024-10-02 PROCEDURE — 36415 COLL VENOUS BLD VENIPUNCTURE: CPT

## 2024-10-03 DIAGNOSIS — M79.602 ARM PAIN, LEFT: Primary | ICD-10-CM

## 2024-10-07 RX ORDER — LOSARTAN POTASSIUM 50 MG/1
50 TABLET ORAL DAILY
Qty: 90 TABLET | Refills: 3 | Status: SHIPPED | OUTPATIENT
Start: 2024-10-07

## 2024-10-07 RX ORDER — HYDROCHLOROTHIAZIDE 12.5 MG/1
12.5 CAPSULE ORAL DAILY
Qty: 90 CAPSULE | Refills: 3 | Status: SHIPPED | OUTPATIENT
Start: 2024-10-07

## 2024-10-07 NOTE — TELEPHONE ENCOUNTER
Refill passed per [facility] protocol.  Requested Prescriptions   Pending Prescriptions Disp Refills    LOSARTAN 50 MG Oral Tab [Pharmacy Med Name: Losartan Potassium 50 MG Oral Tablet] 90 tablet 0     Sig: Take 1 tablet by mouth once daily       Hypertension Medications Protocol Passed - 10/5/2024  9:09 AM        Passed - CMP or BMP in past 12 months        Passed - Last BP reading less than 140/90     BP Readings from Last 1 Encounters:   10/01/24 130/70               Passed - In person appointment or virtual visit in the past 12 mos or appointment in next 3 mos     Recent Outpatient Visits              6 days ago Type 2 diabetes mellitus with hyperglycemia, without long-term current use of insulin (HCC)    [facility], [place] Sam Renteria MD    Office Visit    2 months ago Encounter for gynecological examination with abnormal finding    [facility] - OB/GYN Ruth Ann Mckenna MD    Office Visit    6 months ago Acute idiopathic gout of left foot    [facility] Florencia Dent MD    Office Visit    6 months ago Annual physical exam    [facility], Sam White MD    Office Visit    8 months ago Humerus lesion, right    [facility], [place] Hardeep Caballero MD    Office Visit          Future Appointments         Provider Department Appt Notes    In 1 month BETY PATEL [facility] Colon SCRN @ [facility]    In 6 months Florencia Dent MD [facility] 6 mo f/u    In 9 months Ruth Ann Mckenna MD [facility] - OB/GYN annual                    Passed - EGFRCR or GFRAA > 50     GFR Evaluation  EGFRCR: 88 , resulted on 10/2/2024            HYDROCHLOROTHIAZIDE 12.5 MG Oral Cap [Pharmacy Med Name:  hydroCHLOROthiazide 12.5 MG Oral Capsule] 90 capsule 0     Sig: Take 1 capsule by mouth once daily       Hypertension Medications Protocol Passed - 10/5/2024  9:09 AM        Passed - CMP or BMP in past 12 months        Passed - Last BP reading less than 140/90     BP Readings from Last 1 Encounters:   10/01/24 130/70               Passed - In person appointment or virtual visit in the past 12 mos or appointment in next 3 mos     Recent Outpatient Visits              6 days ago Type 2 diabetes mellitus with hyperglycemia, without long-term current use of insulin ([facility])    [facility] Sam Renteria MD    Office Visit    2 months ago Encounter for gynecological examination with abnormal finding    [facility] - OB[facility] Ruth Ann Mckenna MD    Office Visit    6 months ago Acute idiopathic gout of left foot    [facility] Florencia Dent MD    Office Visit    6 months ago Annual physical exam    [facility] Sam Renteria MD    Office Visit    8 months ago Humerus lesion, right    [facility], [place] Penning[place] MD    Office Visit          Future Appointments         Provider Department Appt Notes    In 1 month BETY PATEL [facility] Colon [facility] @ [facility]    In 6 months Florencia Dent MD [facility] 6 mo f/u    In 9 months Ruth Ann Mckenna MD [facility] - OB/GYN annual                    Passed - EGFRCR or GFRAA > 50     GFR Evaluation  EGFRCR: 88 , resulted on 10/2/2024             Recent Outpatient Visits              6 days ago Type 2 diabetes mellitus with hyperglycemia, without long-term current use of insulin ([facility])    [facility]    Office Visit    2 months ago Encounter for gynecological examination with abnormal finding    [facility] - OB/GYN Ruth Ann Mckenna MD    Office Visit    6 months ago Acute idiopathic gout of left foot    [facility] Fl[facility] Dent MD    Office Visit    6 months ago Annual physical exam    [facility] [place] Sam Renteria MD    Office Visit    8 months ago Humerus lesion, right    [facility] [place] [facility]    Office Visit          Future Appointments         Provider Department Appt Notes    In 1 month BETY PATEL [facility] Colon SCRN @ [facility]    In 6 months Florencia Dent MD [facility] 6 mo f/u    In 9 months Ruth Ann Mckenna MD [facility] - [facility] annual

## 2024-11-12 ENCOUNTER — ANESTHESIA EVENT (OUTPATIENT)
Dept: ENDOSCOPY | Facility: HOSPITAL | Age: 64
End: 2024-11-12
Payer: COMMERCIAL

## 2024-11-12 ENCOUNTER — HOSPITAL ENCOUNTER (OUTPATIENT)
Facility: HOSPITAL | Age: 64
Setting detail: HOSPITAL OUTPATIENT SURGERY
Discharge: HOME | End: 2024-11-12
Attending: INTERNAL MEDICINE | Admitting: INTERNAL MEDICINE
Payer: COMMERCIAL

## 2024-11-12 ENCOUNTER — ANESTHESIA (OUTPATIENT)
Dept: ENDOSCOPY | Facility: HOSPITAL | Age: 64
End: 2024-11-12
Payer: COMMERCIAL

## 2024-11-12 VITALS
RESPIRATION RATE: 12 BRPM | TEMPERATURE: 97 F | OXYGEN SATURATION: 98 % | HEIGHT: 62 IN | SYSTOLIC BLOOD PRESSURE: 126 MMHG | HEART RATE: 69 BPM | WEIGHT: 223 LBS | BODY MASS INDEX: 41.04 KG/M2 | DIASTOLIC BLOOD PRESSURE: 67 MMHG

## 2024-11-12 DIAGNOSIS — Z12.11 COLON CANCER SCREENING: ICD-10-CM

## 2024-11-12 PROBLEM — K63.5 POLYP OF COLON: Status: ACTIVE | Noted: 2024-11-12

## 2024-11-12 PROBLEM — K63.5 POLYP OF DESCENDING COLON: Status: ACTIVE | Noted: 2024-11-12

## 2024-11-12 LAB — GLUCOSE BLDC GLUCOMTR-MCNC: 101 MG/DL (ref 70–99)

## 2024-11-12 PROCEDURE — 82962 GLUCOSE BLOOD TEST: CPT

## 2024-11-12 PROCEDURE — 88305 TISSUE EXAM BY PATHOLOGIST: CPT | Performed by: INTERNAL MEDICINE

## 2024-11-12 PROCEDURE — 0DBM8ZX EXCISION OF DESCENDING COLON, VIA NATURAL OR ARTIFICIAL OPENING ENDOSCOPIC, DIAGNOSTIC: ICD-10-PCS | Performed by: INTERNAL MEDICINE

## 2024-11-12 RX ORDER — SODIUM CHLORIDE, SODIUM LACTATE, POTASSIUM CHLORIDE, CALCIUM CHLORIDE 600; 310; 30; 20 MG/100ML; MG/100ML; MG/100ML; MG/100ML
INJECTION, SOLUTION INTRAVENOUS CONTINUOUS
Status: DISCONTINUED | OUTPATIENT
Start: 2024-11-12 | End: 2024-11-12

## 2024-11-12 RX ORDER — NALOXONE HYDROCHLORIDE 0.4 MG/ML
0.08 INJECTION, SOLUTION INTRAMUSCULAR; INTRAVENOUS; SUBCUTANEOUS ONCE AS NEEDED
Status: DISCONTINUED | OUTPATIENT
Start: 2024-11-12 | End: 2024-11-12

## 2024-11-12 RX ORDER — LIDOCAINE HYDROCHLORIDE 10 MG/ML
INJECTION, SOLUTION EPIDURAL; INFILTRATION; INTRACAUDAL; PERINEURAL AS NEEDED
Status: DISCONTINUED | OUTPATIENT
Start: 2024-11-12 | End: 2024-11-12 | Stop reason: SURG

## 2024-11-12 RX ADMIN — SODIUM CHLORIDE, SODIUM LACTATE, POTASSIUM CHLORIDE, CALCIUM CHLORIDE: 600; 310; 30; 20 INJECTION, SOLUTION INTRAVENOUS at 10:55:00

## 2024-11-12 RX ADMIN — LIDOCAINE HYDROCHLORIDE 50 MG: 10 INJECTION, SOLUTION EPIDURAL; INFILTRATION; INTRACAUDAL; PERINEURAL at 10:38:00

## 2024-11-12 NOTE — DISCHARGE INSTRUCTIONS
Home Care Instructions for Colonoscopy with Sedation    Diet:  - Resume your regular diet as tolerated unless otherwise instructed.  - Start with light meals to minimize bloating.  - Do not drink alcohol today.    Medication:  - If you have questions about resuming your normal medications, please contact your Primary Care Physician.    Activities:  - Take it easy today. Do not return to work today.  - Do not drive today.  - Do not operate any machinery today (including kitchen equipment).    Colonoscopy:  - You may notice some rectal \"spotting\" (a little blood on the toilet tissue) for a day or two after the exam. This is normal.  - If you experience any rectal bleeding (not spotting), persistent tenderness or sharp severe abdominal pains, oral temperature over 100 degrees Fahrenheit, light-headedness or dizziness, or any other problems, contact your doctor.    Gastroscopy:  - You may have a sore throat for 2-3 days following the exam. This is normal. Gargling with warm salt water (1/2 tsp salt to 1 glass warm water) or using throat lozenges will help.  - If you experience any sharp pain in your neck, abdomen or chest, vomiting of blood, oral temperature over 100 degrees Fahrenheit, light-headedness or dizziness, or any other problems, contact your doctor.    **If unable to reach your doctor, please go to the [facility] Emergency Room**    - Your referring physician will receive a full report of your examination.  - If you do not hear from your doctor's office within two weeks of your biopsy, please call them for your results.    You may be able to see your laboratory results in [employer] between 4 and 7 business days.  In some cases, your physician may not have viewed the results before they are released to [employer].  If you have questions regarding your results contact the physician who ordered the test/exam by phone or via [employer] by choosing \"Ask a Medical Question.\"

## 2024-11-12 NOTE — OPERATIVE REPORT
COLONOSCOPY REPORT    Pedro Vieyra     1960 Age 64 year old   PCP Sam Renteria MD Endoscopist Lizy La MD     Date of procedure: 24    Procedure: Colonoscopy w/cold snare polypectomy    Pre-operative diagnosis: Screening, hx of polyps    Post-operative diagnosis: Polyp(s) of colon, diverticulosis of the colon, internal hemorrhoids    Medications: MAC    Withdrawal time: 14 minutes    Procedure:  Informed consent was obtained from the patient after the risks of the procedure were discussed, including but not limited to bleeding, perforation, aspiration, infection, or possibility of a missed lesion. After discussions of the risks/benefits and alternatives to this procedure, as well as the planned sedation, the patient was placed in the left lateral decubitus position and begun on continuous blood pressure pulse oximetry and EKG monitoring and this was maintained throughout the procedure. Once an adequate level of sedation was obtained a digital rectal exam was completed. Then the lubricated tip of the [id number] diagnostic video colonoscope was inserted and advanced without difficulty to the cecum using the CO2 insufflation technique. The cecum was identified by localizing the trifold, the appendix and the ileocecal valve. Withdrawal was begun with thorough washing and careful examination of the colonic walls and folds. A routine second examination of the cecum/ascending colon was performed. Photodocumentation was obtained. The bowel prep was good. Views of the colon were good with washing. I then carefully withdrew the instrument from the patient who tolerated the procedure well.     Complications: none.    Findings:   1. One polyp(s) noted as follows:      A. 7 mm polyp in the descending colon; sessile morphology; cold snare polypectomy and retrieved.  2. Diverticulosis: mild throughout the colon.    3. Terminal ileum: the visualized mucosa appeared normal.    4. The  colonic mucosa throughout the colon showed normal vascular pattern, without evidence of angioectasias or inflammation.     5. A retroflexed view of the rectum revealed small internal hemorrhoids.    6. TUAN: normal rectal tone, no masses palpated.     Impression:   One small colon polyp removed.  Diverticulosis and internal hemorrhoids.    Recommend:  Await pathology. The interval for the next colonoscopy will be determined after reviewing pathology. If new signs or symptoms develop, colonoscopy may need to be repeated sooner.   High fiber diet.  Monitor for blood in the stool. If having more than just tinge of blood, call office or go to the ER.    >>>If tissue was obtained and you have not received your pathology results either by phone or letter within 2 weeks, please call our office at [phone].    Specimens: colon   Blood loss: <1 ml      ----------------------------------------------------------------------------------------------------------------------------------    INTERVAL FOR COLONOSCOPY:   - If there is no family history of colon cancer and no colon polyps identified in an adequately prepped colon - colonoscopy should be repeated in 10 years. Various factors should be considered regarding repeat colonoscopy - including co-morbid conditions, ability to tolerate procedure with advanced age, and desire for repeat testing.   - If no colon polyps were identified and a positive family history of colon cancer - the colonoscopy should be repeated in 5 years.   - If colon polyps were removed, the colonoscopy should be repeated sooner depending on size/type/location of polyp.

## 2024-11-12 NOTE — ANESTHESIA POSTPROCEDURE EVALUATION
Patient: Pedro Vieyra    Procedure Summary       Date: 11/12/24 Room / Location: [facility] ENDOSCOPY 03 / EM ENDOSCOPY    Anesthesia Start: 1036 Anesthesia Stop:     Procedure: COLONOSCOPY Diagnosis:       Colon cancer screening      (colon polyp, hemorrhoids, diverticulosis)    Surgeons: KIEL La MD Anesthesiologist: Iona Gunn CRNA    Anesthesia Type: MAC ASA Status: 2            Anesthesia Type: MAC    Vitals Value Taken Time   BP 97/58 11/12/24 1058   Temp 97 °F (36.1 °C) 11/12/24 1058   Pulse 75 11/12/24 1058   Resp 16 11/12/24 1058   SpO2 100 % 11/12/24 1058       [facility] AN Post Evaluation:   Patient Evaluated in PACU  Patient Participation: complete - patient participated  Level of Consciousness: sleepy but conscious  Pain Score: 0  Pain Management: adequate  Airway Patency:patent  Dental exam unchanged from preop  Yes    Cardiovascular Status: stable and acceptable  Respiratory Status: acceptable and room air  Postoperative Hydration acceptable      IONA GUNN CRNA  11/12/2024 10:59 AM
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
## 2024-11-14 RX ORDER — METFORMIN HYDROCHLORIDE 500 MG/1
1500 TABLET, EXTENDED RELEASE ORAL DAILY
Qty: 270 TABLET | Refills: 3 | Status: SHIPPED | OUTPATIENT
Start: 2024-11-14

## 2024-11-14 NOTE — TELEPHONE ENCOUNTER
REFILL PASSED PER [facility] PROTOCOLS    Requested Prescriptions   Pending Prescriptions Disp Refills    METFORMIN  MG Oral Tablet 24 Hr [Pharmacy Med Name: metFORMIN HCl  MG Oral Tablet Extended Release 24 Hour] 270 tablet 0     Sig: Take 3 tablets by mouth once daily       Diabetes Medication Protocol Passed - 11/14/2024  3:31 PM        Passed - Last A1C < 7.5 and within past 6 months     Lab Results   Component Value Date    A1C 6.2 (A) 10/01/2024             Passed - In person appointment or virtual visit in the past 6 mos or appointment in next 3 mos     Recent Outpatient Visits              1 month ago Type 2 diabetes mellitus with hyperglycemia, without long-term current use of insulin (HCC)    [facility] [place] Sam Renteria MD    Office Visit    4 months ago Encounter for gynecological examination with abnormal finding    [facility] Ruth Ann Mckenna MD    Office Visit    7 months ago Acute idiopathic gout of left foot    [facility]    Office Visit    7 months ago Annual physical exam    [facility] White MD    Office Visit    9 months ago Humerus lesion, right    [facility] [facility]    Office Visit          Future Appointments         Provider Department Appt Notes    In 4 months Florencia Dent MD [facility] [place] 6 mo f/u    In 8 months Ruth Ann Mckenna MD [facility] annual                    Passed - Microalbumin procedure in past 12 months or taking ACE/ARB        Passed - EGFRCR or GFRAA > 50     GFR Evaluation  EGFRCR: 88 , resulted on 10/2/2024          Passed - GFR in the past 12 months             Future Appointments         Provider  Department Appt Notes    In 4 months Florencia Dent MD [facility] 6 mo f/u    In 8 months Ruth Ann Mckenna MD [facility] - OB/GYN annual          Recent Outpatient Visits              1 month ago Type 2 diabetes mellitus with hyperglycemia, without long-term current use of insulin (HCC)    [facility] Jim[facility] banerjee MD    Office Visit    4 months ago Encounter for gynecological examination with abnormal finding    [facility] - OB/GYN Ruth Ann Mckenna MD    Office Visit    7 months ago Acute idiopathic gout of left foot    [facility] Florencia Dent MD    Office Visit    7 months ago Annual physical exam    [facility] Sam Renteria MD    Office Visit    9 months ago Humerus lesion, right    [facility] Hardeep Caballero MD    Office Visit

## 2024-11-26 ENCOUNTER — TELEPHONE (OUTPATIENT)
Facility: CLINIC | Age: 64
End: 2024-11-26

## 2024-11-26 NOTE — TELEPHONE ENCOUNTER
I mailed out colonoscopy results letter to pt  Updated health maintenance  Entered into 7 yr CLN recall  Recall colon in 7 years per. Colon done 11/12/2024    KIEL La MD  [facility] Clinical Staff  GI staff: please place recall for colonoscopy in 7 years.

## 2024-12-19 ENCOUNTER — HOSPITAL ENCOUNTER (OUTPATIENT)
Dept: GENERAL RADIOLOGY | Facility: HOSPITAL | Age: 64
Discharge: HOME | End: 2024-12-19
Attending: ORTHOPAEDIC SURGERY
Payer: COMMERCIAL

## 2024-12-19 ENCOUNTER — OFFICE VISIT (OUTPATIENT)
Dept: ORTHOPEDICS CLINIC | Facility: CLINIC | Age: 64
End: 2024-12-19
Payer: COMMERCIAL

## 2024-12-19 DIAGNOSIS — Z47.89 ORTHOPEDIC AFTERCARE: ICD-10-CM

## 2024-12-19 DIAGNOSIS — M67.912 TENDINOPATHY OF LEFT ROTATOR CUFF: Primary | ICD-10-CM

## 2024-12-19 DIAGNOSIS — M89.9 HUMERUS LESION, RIGHT: ICD-10-CM

## 2024-12-19 PROCEDURE — 99214 OFFICE O/P EST MOD 30 MIN: CPT | Performed by: ORTHOPAEDIC SURGERY

## 2024-12-19 PROCEDURE — 73030 X-RAY EXAM OF SHOULDER: CPT | Performed by: ORTHOPAEDIC SURGERY
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
## 2024-12-30 RX ORDER — DULAGLUTIDE 1.5 MG/.5ML
1.5 INJECTION, SOLUTION SUBCUTANEOUS WEEKLY
Qty: 12 ML | Refills: 1 | Status: SHIPPED | OUTPATIENT
Start: 2024-12-30

## 2024-12-30 NOTE — TELEPHONE ENCOUNTER
Refill passed per [facility] protocol.     Requested Prescriptions   Pending Prescriptions Disp Refills    TRULICITY 1.5 MG/0.5ML Subcutaneous Solution Auto-injector [Pharmacy Med Name: Trulicity 1.5 MG/0.5ML Subcutaneous Solution Pen-injector] 12 mL 0     Sig: INJECT 1.5 MG INTO THE SKIN ONCE A WEEK       Diabetes Medication Protocol Passed - 12/30/2024  3:14 PM        Passed - Last A1C < 7.5 and within past 6 months     Lab Results   Component Value Date    A1C 6.2 (A) 10/01/2024             Passed - In person appointment or virtual visit in the past 6 mos or appointment in next 3 mos     Recent Outpatient Visits              1 week ago Tendinopathy of left rotator cuff    [facility] Gerry Greco MD    Office Visit    3 months ago Type 2 diabetes mellitus with hyperglycemia, without long-term current use of insulin ([facility])    [facility] Sam Renteria MD    Office Visit    5 months ago Encounter for gynecological examination with abnormal finding    [facility] - OB/GYN Ruth Ann Mckenna MD    Office Visit    8 months ago Acute idiopathic gout of left foot    [facility] Florencia Dent MD    Office Visit    9 months ago Annual physical exam    [facility] Sam Renteria MD    Office Visit          Future Appointments         Provider Department Appt Notes    In 3 months Florencia Dent MD [facility] 6 mo f/u    In 6 months Ruth Ann Mckenna MD [facility] - OB/GYN annual                    Passed - Microalbumin procedure in past 12 months or taking ACE/ARB        Passed - EGFRCR or GFRAA > 50     GFR Evaluation  EGFRCR: 88 , resulted on 10/2/2024          Passed - GFR in the past 12 months

## 2025-01-13 ENCOUNTER — TELEPHONE (OUTPATIENT)
Dept: INTERNAL MEDICINE CLINIC | Facility: CLINIC | Age: 65
End: 2025-01-13

## 2025-01-13 NOTE — TELEPHONE ENCOUNTER
Current Outpatient Medications   Medication Sig Dispense Refill    Dulaglutide (TRULICITY) 1.5 MG/0.5ML Subcutaneous Solution Auto-injector Inject 1.5 mg into the skin once a week. 12 mL 1     To ensure timely dispensing for the patient, notify Pharmacy when Prior Auth is approved.    RX # [id number]  RX Written Date: 12/30/2024  Last Fill Date: 1/2/25  Pres Drug: TRULICITY 1.5/ 0.5 INJ   Pres Qty: 12  Sig: Inject 1.5 MG into the skin once a week

## 2025-01-15 NOTE — TELEPHONE ENCOUNTER
Message left for pharmacy regarding approval.     Approved    Prior authorization approved  Payer: GENOVEVA Perez[employer]reland Case ID: [id number]    [id number] [phone]  Note from payer: Your PA request has been approved.  Additional information will be provided in the approval communication. (Message 1142)  Approval Details    Authorized from December 15, 2024 to January 14, 2026  Electronic appeal: Not supported

## 2025-01-17 ENCOUNTER — TELEPHONE (OUTPATIENT)
Dept: INTERNAL MEDICINE CLINIC | Facility: CLINIC | Age: 65
End: 2025-01-17

## 2025-01-17 DIAGNOSIS — R92.8 ABNORMAL MAMMOGRAM: Primary | ICD-10-CM

## 2025-01-17 NOTE — TELEPHONE ENCOUNTER
Central Scheduling states that patient tried to schedule her mammogram but the order is not in. Last mammogram was 7/25/24. Orders pending. RN please call patient when order is in so that she can schedule.      RECOMMENDATIONS:   SHORT TERM FOLLOW-UP DIAGNOSTIC MAMMOGRAM LEFT BREAST IN 6 MONTHS.       SHORT TERM FOLLOW-UP ULTRASOUND LEFT BREAST IN 6 MONTHS.

## 2025-01-24 ENCOUNTER — HOSPITAL ENCOUNTER (OUTPATIENT)
Dept: MRI IMAGING | Facility: HOSPITAL | Age: 65
Discharge: HOME | End: 2025-01-24
Attending: ORTHOPAEDIC SURGERY
Payer: COMMERCIAL

## 2025-01-24 DIAGNOSIS — M67.912 TENDINOPATHY OF LEFT ROTATOR CUFF: ICD-10-CM

## 2025-01-24 PROCEDURE — 73221 MRI JOINT UPR EXTREM W/O DYE: CPT | Performed by: ORTHOPAEDIC SURGERY

## 2025-02-05 NOTE — TELEPHONE ENCOUNTER
Please review. Protocol Failed; No Protocol    Please advise if appropriate.    Per last office visit discussion to increase to Rosuvastatin 30 mg    Requested Prescriptions   Pending Prescriptions Disp Refills    ROSUVASTATIN 20 MG Oral Tab [Pharmacy Med Name: Rosuvastatin Calcium 20 MG Oral Tablet] 90 tablet 0     Sig: Take 1 tablet by mouth nightly       Cholesterol Medication Protocol Failed - 2/5/2025  4:46 PM        Failed - Medication is active on med list        Passed - ALT < 80     Lab Results   Component Value Date    ALT 28 10/02/2024             Passed - ALT resulted within past year        Passed - Lipid panel within past 12 months     Lab Results   Component Value Date    CHOLEST 199 10/02/2024    TRIG 366 (H) 10/02/2024    HDL 44 10/02/2024    LDL 94 10/02/2024    VLDL 61 (H) 10/02/2024    NONHDLC 155 (H) 10/02/2024             Passed - In person appointment or virtual visit in the past 12 mos or appointment in next 3 mos     Recent Outpatient Visits              1 month ago Tendinopathy of left rotator cuff    [facility]    Office Visit    4 months ago Type 2 diabetes mellitus with hyperglycemia, without long-term current use of insulin (HCC)    [facility] Sam Renteria MD    Office Visit    6 months ago Encounter for gynecological examination with abnormal finding    [facility] Ruth Ann Mckenna MD    Office Visit    10 months ago Acute idiopathic gout of left foot    [facility]    Office Visit    10 months ago Annual physical exam    [facility] Sam Renteria MD    Office Visit          Future Appointments         Provider Department Appt Notes    Tomorrow Gerry Greco MD [facility]      In 5 days [address] 6 month f/u    In 2 months Florencia Dent MD [facility] 6 mo f/u    In 5 months Ruth Ann Mckenna MD [facility] - OB/GYN annual                           Future Appointments         Provider Department Appt Notes    Tomorrow Gerry Greco MD [facility]     In 5 days [address] 6 month f/u    In 2 months Florencia Dent MD [facility] 6 mo f/u    In 5 months Ruth Ann Mcknena MD [facility] - OB/GYN annual          Recent Outpatient Visits              1 month ago Tendinopathy of left rotator cuff    [facility] Gerry Greco MD    Office Visit    4 months ago Type 2 diabetes mellitus with hyperglycemia, without long-term current use of insulin (HCC)    [facility] Sam Renteria MD    Office Visit    6 months ago Encounter for gynecological examination with abnormal finding    [facility] - OB/GYN Ruth Ann Mckenna MD    Office Visit    10 months ago Acute idiopathic gout of left foot    [facility] Florencia Dent MD    Office Visit    10 months ago Annual physical exam    [facility] Sam Renteria MD    Office Visit

## 2025-02-06 ENCOUNTER — OFFICE VISIT (OUTPATIENT)
Dept: ORTHOPEDICS CLINIC | Facility: CLINIC | Age: 65
End: 2025-02-06

## 2025-02-06 DIAGNOSIS — S46.012D TRAUMATIC INCOMPLETE TEAR OF LEFT ROTATOR CUFF, SUBSEQUENT ENCOUNTER: Primary | ICD-10-CM

## 2025-02-06 PROCEDURE — 99213 OFFICE O/P EST LOW 20 MIN: CPT | Performed by: ORTHOPAEDIC SURGERY

## 2025-02-06 PROCEDURE — 20610 DRAIN/INJ JOINT/BURSA W/O US: CPT | Performed by: ORTHOPAEDIC SURGERY

## 2025-02-06 RX ORDER — ROSUVASTATIN CALCIUM 20 MG/1
20 TABLET, COATED ORAL NIGHTLY
Qty: 90 TABLET | Refills: 1 | Status: SHIPPED | OUTPATIENT
Start: 2025-02-06

## 2025-02-06 RX ORDER — TRIAMCINOLONE ACETONIDE 40 MG/ML
40 INJECTION, SUSPENSION INTRA-ARTICULAR; INTRAMUSCULAR ONCE
Status: COMPLETED | OUTPATIENT
Start: 2025-02-06 | End: 2025-02-06

## 2025-02-06 RX ADMIN — TRIAMCINOLONE ACETONIDE 40 MG: 40 INJECTION, SUSPENSION INTRA-ARTICULAR; INTRAMUSCULAR at 16:05:00

## 2025-02-06 NOTE — PROGRESS NOTES
Per verbal order from Dr. Greco draw up 3ml of 0.5% Marcaine & 2ml 1% lidocaine and 1ml of Kenalog 40 for cortisone injection to left  shoulder.  Sunitha QUICK MA    Patient provided education handout for cortisone injection.
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
## 2025-02-07 ENCOUNTER — TELEPHONE (OUTPATIENT)
Dept: PHYSICAL THERAPY | Facility: HOSPITAL | Age: 65
End: 2025-02-07

## 2025-02-10 ENCOUNTER — OFFICE VISIT (OUTPATIENT)
Dept: PHYSICAL THERAPY | Age: 65
End: 2025-02-10
Attending: ORTHOPAEDIC SURGERY
Payer: COMMERCIAL

## 2025-02-10 ENCOUNTER — HOSPITAL ENCOUNTER (OUTPATIENT)
Dept: ULTRASOUND IMAGING | Facility: HOSPITAL | Age: 65
Discharge: HOME | End: 2025-02-10
Attending: INTERNAL MEDICINE
Payer: COMMERCIAL

## 2025-02-10 ENCOUNTER — HOSPITAL ENCOUNTER (OUTPATIENT)
Dept: MAMMOGRAPHY | Facility: HOSPITAL | Age: 65
Discharge: HOME | End: 2025-02-10
Attending: INTERNAL MEDICINE
Payer: COMMERCIAL

## 2025-02-10 DIAGNOSIS — S46.012D TRAUMATIC INCOMPLETE TEAR OF LEFT ROTATOR CUFF, SUBSEQUENT ENCOUNTER: Primary | ICD-10-CM

## 2025-02-10 DIAGNOSIS — R92.8 ABNORMAL MAMMOGRAM: ICD-10-CM

## 2025-02-10 PROCEDURE — 97161 PT EVAL LOW COMPLEX 20 MIN: CPT | Performed by: PHYSICAL THERAPIST

## 2025-02-10 PROCEDURE — 97110 THERAPEUTIC EXERCISES: CPT | Performed by: PHYSICAL THERAPIST

## 2025-02-10 PROCEDURE — 77065 DX MAMMO INCL CAD UNI: CPT | Performed by: INTERNAL MEDICINE

## 2025-02-10 PROCEDURE — 76642 ULTRASOUND BREAST LIMITED: CPT | Performed by: INTERNAL MEDICINE

## 2025-02-10 PROCEDURE — 77061 BREAST TOMOSYNTHESIS UNI: CPT | Performed by: INTERNAL MEDICINE
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
## 2025-02-13 ENCOUNTER — OFFICE VISIT (OUTPATIENT)
Dept: PHYSICAL THERAPY | Age: 65
End: 2025-02-13
Attending: ORTHOPAEDIC SURGERY
Payer: COMMERCIAL

## 2025-02-13 PROCEDURE — 97110 THERAPEUTIC EXERCISES: CPT | Performed by: PHYSICAL THERAPIST

## 2025-02-13 PROCEDURE — 97140 MANUAL THERAPY 1/> REGIONS: CPT | Performed by: PHYSICAL THERAPIST

## 2025-02-13 PROCEDURE — 97112 NEUROMUSCULAR REEDUCATION: CPT | Performed by: PHYSICAL THERAPIST

## 2025-02-13 NOTE — PROGRESS NOTES
Patient: Pedro Vieyra (64 year old, female) Referring Provider:  Insurance:   Diagnosis: Traumatic incomplete tear of left rotator cuff, subsequent encounter (C23.027P) Gerry Greco  [employer] [place]   Date of Surgery: No data recorded Next MD visit:  N/A   Precautions:  None No data recorded Referral Information:    Date of Evaluation: Req: 0, Auth: 0, Exp:     02/10/25 POC Auth Visits:          Today's Date   2/13/2025      Subjective:   Patient states she has been doing her exercises. Reports some pain.            Pain: 6/10.       Objective:  L shoulder ROM: flexion 0-140, abduction 0-130, external rotation 0-80, internal rotation 0-80  L elbow ROM is WFL into all planes    L UE strength is grossly 3+/5 into L shoulder motion. Rest of L UE grossly 5/5      Assessment:   Demonstrates improving L shoulder ROM but still with pain during active motion      Goals:     Goals        Therapy Goals       Not Met Progress Toward Partially Met Met   Patient will be independent with their home program, its progression, and management of residual symptoms. [] [x] [] []   Patient will report pain of not more than 1/10 during activity. [] [x] [] []   Patient will improve L shoulder ROM to WFL into all planes to improve UE mobility. [] [x] [] []   Increase strength to 5/5 throughout to be able to perform previous activities without difficulty. [] [x] [] []   Patient will verbalize and demonstrate strategies to improve posture and body mechanics during activity. [] [x] [] []   Patient will resume all previous activities including lifting, carrying, and reaching.   [] [x] [] []                   Plan:   Continue PT to improve mobility     2/13/2025     Treatment Day 2/ [employer] [place]   Thera Ex   X25min  - B shoulder extension and behind the back stretch AROM 10 x 2  - manual PT  - supine overhead B shoulder flexion with stick 10 x 2  - supine B shoulder horizontal abduction with 1lb db 10 x 2  - B shoulder rows 15# 10 x 3  - B  shoulder extensions 10# 10 x 3  - L shoulder external rotation with yellow theraband 10 x 2  - L shoulder internal rotation with yellow theraband 10 x 2  - neuro re-ed     Neuromuscular Re-education   X10min  - swiss ball wall roll ups 10 x 2  - swiss ball wall push ups 10 x 2  - Multi-pull single UE shoulder row 10# 10 x 2     Manual PT   X10min  - L shoulder mobilization into all planes with joint glides     Modalities                  (Time in minutes)    Therapeutic Exercises   25min   Neuro Re-education   10min   Manual PT   10min   Therapeutic Activity      Gait Training      Total of Timed Procedures 45min     Total of Service-Based Procedures    Total Treatment Time 45min         Charges: EX2, Neuro re-ed1, Manual PT1

## 2025-02-24 RX ORDER — PANTOPRAZOLE SODIUM 40 MG/1
40 TABLET, DELAYED RELEASE ORAL
Qty: 90 TABLET | Refills: 0 | Status: SHIPPED | OUTPATIENT
Start: 2025-02-24

## 2025-02-24 NOTE — TELEPHONE ENCOUNTER
Requested Prescriptions     Pending Prescriptions Disp Refills    PANTOPRAZOLE 40 MG Oral Tab EC [Pharmacy Med Name: Pantoprazole Sodium 40 MG Oral Tablet Delayed Release] 90 tablet 0     Sig: TAKE 1 TABLET BY MOUTH BEFORE BREAKFAST       LOV  12/12/2023  LR   12/12/2023    em

## 2025-02-28 ENCOUNTER — OFFICE VISIT (OUTPATIENT)
Dept: PHYSICAL THERAPY | Age: 65
End: 2025-02-28
Attending: ORTHOPAEDIC SURGERY
Payer: COMMERCIAL

## 2025-02-28 PROCEDURE — 97110 THERAPEUTIC EXERCISES: CPT | Performed by: PHYSICAL THERAPIST

## 2025-02-28 PROCEDURE — 97112 NEUROMUSCULAR REEDUCATION: CPT | Performed by: PHYSICAL THERAPIST

## 2025-02-28 NOTE — PROGRESS NOTES
Patient: Pedro Vieyra (64 year old, female) Referring Provider:  Insurance:   Diagnosis: Traumatic incomplete tear of left rotator cuff, subsequent encounter (T24.632J) Gerry Greco  [employer] [place]   Date of Surgery: No data recorded Next MD visit:  N/A   Precautions:  None No data recorded Referral Information:    Date of Evaluation: Req: 0, Auth: 0, Exp:     02/10/25 POC Auth Visits:          Today's Date   2/28/2025      Subjective:   Patient states she has been doing her exercises. Reports some pain.            Pain: 6/10.       Objective:  L shoulder ROM: flexion 0-140, abduction 0-130, external rotation 0-80, internal rotation 0-80  L elbow ROM is WFL into all planes    L UE strength is grossly 3+/5 into L shoulder motion. Rest of L UE grossly 5/5      Assessment:   Demonstrates improving L shoulder ROM but still with pain during active motion. Patient and PT goals are in progress.      Goals:     Goals        Therapy Goals       Not Met Progress Toward Partially Met Met   Patient will be independent with their home program, its progression, and management of residual symptoms. [] [x] [] []   Patient will report pain of not more than 1/10 during activity. [] [x] [] []   Patient will improve L shoulder ROM to WFL into all planes to improve UE mobility. [] [x] [] []   Increase strength to 5/5 throughout to be able to perform previous activities without difficulty. [] [x] [] []   Patient will verbalize and demonstrate strategies to improve posture and body mechanics during activity. [] [x] [] []   Patient will resume all previous activities including lifting, carrying, and reaching.   [] [x] [] []                   Plan:   Continue PT to improve mobility     2/28/2025 2/13/2025     Treatment Day 3/ [place]   Thera Ex   X40min  - B shoulder extension and behind the back stretch AROM 10 x 2  - B shoulder behind the back stretch  - supine B shoulder overhead flexion with 2# wt bar 10 x 2  -  sidelying R shoulder external rotation with 1lb db 10 x 2  - sidelying L shoulder scaption AROM 10 x 2  - prone shoulder extension 10 x 2  - prone shoulder horizontal abduction, flexion AROM 10 x 2  - shoulder stretch into flexion 10 x 2  - neuro re-ed  -    X25min  - B shoulder extension and behind the back stretch AROM 10 x 2  - manual PT  - supine overhead B shoulder flexion with stick 10 x 2  - supine B shoulder horizontal abduction with 1lb db 10 x 2  - B shoulder rows 15# 10 x 3  - B shoulder extensions 10# 10 x 3  - L shoulder external rotation with yellow theraband 10 x 2  - L shoulder internal rotation with yellow theraband 10 x 2  - neuro re-ed     Neuromuscular Re-education   X10min  - swiss ball wall roll ups 10 x 2  - swiss ball wall push ups 10 x 2  - Multi-pull single UE shoulder row 10# 10 x 2 X10min  - swiss ball wall roll ups 10 x 2  - swiss ball wall push ups 10 x 2  - Multi-pull single UE shoulder row 10# 10 x 2     Manual PT    X10min  - L shoulder mobilization into all planes with joint glides     Modalities                     (Time in minutes)     Therapeutic Exercises   40min 25min   Neuro Re-education   10min 10min   Manual PT    10min   Therapeutic Activity       Gait Training       Total of Timed Procedures 45min 45min     Total of Service-Based Procedures     Total Treatment Time 45min 45min         Charges: EX3, Neuro re-ed1,

## 2025-03-05 ENCOUNTER — OFFICE VISIT (OUTPATIENT)
Dept: PHYSICAL THERAPY | Age: 65
End: 2025-03-05
Attending: ORTHOPAEDIC SURGERY
Payer: COMMERCIAL

## 2025-03-05 PROCEDURE — 97110 THERAPEUTIC EXERCISES: CPT | Performed by: PHYSICAL THERAPIST

## 2025-03-05 PROCEDURE — 97112 NEUROMUSCULAR REEDUCATION: CPT | Performed by: PHYSICAL THERAPIST

## 2025-03-05 NOTE — PROGRESS NOTES
Patient: Pedro Vieyra (64 year old, female) Referring Provider:  Insurance:   Diagnosis: Traumatic incomplete tear of left rotator cuff, subsequent encounter (S40.206D) Gerry Greco  [place]   Date of Surgery: No data recorded Next MD visit:  N/A   Precautions:  None No data recorded Referral Information:    Date of Evaluation: Req: 0, Auth: 0, Exp:     02/10/25 POC Auth Visits:          Today's Date   3/5/2025      Subjective:   Patient states her shoulder is feeling better. Reports no pain at this time.            Pain: 0/10.       Objective:  L shoulder ROM: flexion 0-140, abduction 0-130, external rotation 0-80, internal rotation 0-80  L elbow ROM is WFL into all planes    L UE strength is grossly 3+/5 into L shoulder motion. Rest of L UE grossly 5/5      Assessment:   Demonstrates improved L shoulder mobility and strength. Reports significantly decreased pain with better tolerance to reaching, lifting, and carrying activities. Patient and PT goals are in progress.      Goals:     Goals        Therapy Goals       Not Met Progress Toward Partially Met Met   Patient will be independent with their home program, its progression, and management of residual symptoms. [] [] [] [x]   Patient will report pain of not more than 1/10 during activity. [] [] [x] []   Patient will improve L shoulder ROM to WFL into all planes to improve UE mobility. [] [] [x] []   Increase strength to 5/5 throughout to be able to perform previous activities without difficulty. [] [x] [] []   Patient will verbalize and demonstrate strategies to improve posture and body mechanics during activity. [] [] [x] []   Patient will resume all previous activities including lifting, carrying, and reaching.   [] [x] [] []                   Plan:   Continue PT to improve mobility     3/5/2025 2/28/2025 2/13/2025     Treatment Day 4/ [place] 3[place] 2[place]   Thera Ex   X38min  - B shoulder extension and behind the back stretch  AROM 10 x 2  - supine forward press with 5# wt bar 10 x 3  - supine B shoulder overhead flexion with 2# wt bar 10 x 2  - supine B shoulder horizontal abduction AROM  - sidelying L shoulder external rotation, scaption with 1lb db 10 x 3  - prone shoulder extension, single arm rows 2lb db's 10 x 2  - shoulder stretch into flexion against wall 10 x 2  - shoulder rows with 20# cable resistance 10 x 3  - shoulder extensions with 10# cable resistance 10 x 2  - L shoulder external and internal rotation with red theraband 10 x 2   X40min  - B shoulder extension and behind the back stretch AROM 10 x 2  - B shoulder behind the back stretch  - supine B shoulder overhead flexion with 2# wt bar 10 x 2  - sidelying R shoulder external rotation with 1lb db 10 x 2  - sidelying L shoulder scaption AROM 10 x 2  - prone shoulder extension 10 x 2  - prone shoulder horizontal abduction, flexion AROM 10 x 2  - shoulder stretch into flexion 10 x 2  - neuro re-ed  -    X25min  - B shoulder extension and behind the back stretch AROM 10 x 2  - manual PT  - supine overhead B shoulder flexion with stick 10 x 2  - supine B shoulder horizontal abduction with 1lb db 10 x 2  - B shoulder rows 15# 10 x 3  - B shoulder extensions 10# 10 x 3  - L shoulder external rotation with yellow theraband 10 x 2  - L shoulder internal rotation with yellow theraband 10 x 2  - neuro re-ed     Neuromuscular Re-education   X10min  - swiss ball wall roll ups 10 x 2  - swiss ball wall push ups 10 x 2  - Multi-pull single UE shoulder rows 10# 10 x 2 X10min  - swiss ball wall roll ups 10 x 2  - swiss ball wall push ups 10 x 2  - Multi-pull single UE shoulder row 10# 10 x 2 X10min  - swiss ball wall roll ups 10 x 2  - swiss ball wall push ups 10 x 2  - Multi-pull single UE shoulder row 10# 10 x 2     Manual PT     X10min  - L shoulder mobilization into all planes with joint glides     Modalities                        (Time in minutes)      Therapeutic Exercises   38min  40min 25min   Neuro Re-education   10min 10min 10min   Manual PT     10min   Therapeutic Activity        Gait Training        Total of Timed Procedures 48min 45min 45min     Total of Service-Based Procedures      Total Treatment Time 48min 45min 45min         Charges: EX3, Neuro re-ed1,

## 2025-03-06 ENCOUNTER — LAB ENCOUNTER (OUTPATIENT)
Dept: LAB | Age: 65
End: 2025-03-06
Attending: INTERNAL MEDICINE
Payer: COMMERCIAL

## 2025-03-06 ENCOUNTER — TELEPHONE (OUTPATIENT)
Dept: INTERNAL MEDICINE CLINIC | Facility: CLINIC | Age: 65
End: 2025-03-06

## 2025-03-06 ENCOUNTER — OFFICE VISIT (OUTPATIENT)
Dept: INTERNAL MEDICINE CLINIC | Facility: CLINIC | Age: 65
End: 2025-03-06
Payer: COMMERCIAL

## 2025-03-06 ENCOUNTER — EKG ENCOUNTER (OUTPATIENT)
Dept: LAB | Age: 65
End: 2025-03-06
Attending: INTERNAL MEDICINE
Payer: COMMERCIAL

## 2025-03-06 VITALS
WEIGHT: 215.06 LBS | DIASTOLIC BLOOD PRESSURE: 80 MMHG | SYSTOLIC BLOOD PRESSURE: 124 MMHG | HEART RATE: 76 BPM | BODY MASS INDEX: 39 KG/M2

## 2025-03-06 DIAGNOSIS — M17.11 PRIMARY OSTEOARTHRITIS OF RIGHT KNEE: ICD-10-CM

## 2025-03-06 DIAGNOSIS — Z01.818 PREOP GENERAL PHYSICAL EXAM: ICD-10-CM

## 2025-03-06 DIAGNOSIS — E78.2 MIXED HYPERLIPIDEMIA: ICD-10-CM

## 2025-03-06 DIAGNOSIS — E11.9 CONTROLLED TYPE 2 DIABETES MELLITUS WITHOUT COMPLICATION, WITHOUT LONG-TERM CURRENT USE OF INSULIN (HCC): ICD-10-CM

## 2025-03-06 DIAGNOSIS — Z01.818 PREOP GENERAL PHYSICAL EXAM: Primary | ICD-10-CM

## 2025-03-06 DIAGNOSIS — I10 ESSENTIAL HYPERTENSION: ICD-10-CM

## 2025-03-06 LAB
ALBUMIN SERPL-MCNC: 4.7 G/DL (ref 3.2–4.8)
ALBUMIN/GLOB SERPL: 2 {RATIO} (ref 1–2)
ALP LIVER SERPL-CCNC: 76 U/L
ALT SERPL-CCNC: 34 U/L
ANION GAP SERPL CALC-SCNC: 10 MMOL/L (ref 0–18)
AST SERPL-CCNC: 28 U/L (ref ?–34)
ATRIAL RATE: 66 BPM
BASOPHILS # BLD AUTO: 0.05 X10(3) UL (ref 0–0.2)
BASOPHILS NFR BLD AUTO: 0.6 %
BILIRUB SERPL-MCNC: 0.6 MG/DL (ref 0.2–1.1)
BUN BLD-MCNC: 12 MG/DL (ref 9–23)
BUN/CREAT SERPL: 15.6 (ref 10–20)
CALCIUM BLD-MCNC: 9.7 MG/DL (ref 8.7–10.4)
CHLORIDE SERPL-SCNC: 103 MMOL/L (ref 98–112)
CHOLEST SERPL-MCNC: 159 MG/DL (ref ?–200)
CO2 SERPL-SCNC: 27 MMOL/L (ref 21–32)
CREAT BLD-MCNC: 0.77 MG/DL
CREAT UR-SCNC: 104.2 MG/DL
DEPRECATED RDW RBC AUTO: 45.1 FL (ref 35.1–46.3)
EGFRCR SERPLBLD CKD-EPI 2021: 87 ML/MIN/1.73M2 (ref 60–?)
EOSINOPHIL # BLD AUTO: 0.12 X10(3) UL (ref 0–0.7)
EOSINOPHIL NFR BLD AUTO: 1.5 %
ERYTHROCYTE [DISTWIDTH] IN BLOOD BY AUTOMATED COUNT: 13.7 % (ref 11–15)
FASTING PATIENT LIPID ANSWER: YES
FASTING STATUS PATIENT QL REPORTED: YES
GLOBULIN PLAS-MCNC: 2.4 G/DL (ref 2–3.5)
GLUCOSE BLD-MCNC: 76 MG/DL (ref 70–99)
HCT VFR BLD AUTO: 41.9 %
HDLC SERPL-MCNC: 71 MG/DL (ref 40–59)
HEMOGLOBIN A1C: 6 % (ref 4.3–5.6)
HGB BLD-MCNC: 13.6 G/DL
IMM GRANULOCYTES # BLD AUTO: 0.03 X10(3) UL (ref 0–1)
IMM GRANULOCYTES NFR BLD: 0.4 %
LDLC SERPL CALC-MCNC: 69 MG/DL (ref ?–100)
LYMPHOCYTES # BLD AUTO: 3.12 X10(3) UL (ref 1–4)
LYMPHOCYTES NFR BLD AUTO: 40.2 %
MCH RBC QN AUTO: 29.6 PG (ref 26–34)
MCHC RBC AUTO-ENTMCNC: 32.5 G/DL (ref 31–37)
MCV RBC AUTO: 91.3 FL
MICROALBUMIN UR-MCNC: <0.3 MG/DL
MONOCYTES # BLD AUTO: 0.53 X10(3) UL (ref 0.1–1)
MONOCYTES NFR BLD AUTO: 6.8 %
NEUTROPHILS # BLD AUTO: 3.92 X10 (3) UL (ref 1.5–7.7)
NEUTROPHILS # BLD AUTO: 3.92 X10(3) UL (ref 1.5–7.7)
NEUTROPHILS NFR BLD AUTO: 50.5 %
NONHDLC SERPL-MCNC: 88 MG/DL (ref ?–130)
OSMOLALITY SERPL CALC.SUM OF ELEC: 289 MOSM/KG (ref 275–295)
P AXIS: 28 DEGREES
P-R INTERVAL: 202 MS
PLATELET # BLD AUTO: 238 10(3)UL (ref 150–450)
POTASSIUM SERPL-SCNC: 4 MMOL/L (ref 3.5–5.1)
PROT SERPL-MCNC: 7.1 G/DL (ref 5.7–8.2)
Q-T INTERVAL: 428 MS
QRS DURATION: 76 MS
QTC CALCULATION (BEZET): 448 MS
R AXIS: -22 DEGREES
RBC # BLD AUTO: 4.59 X10(6)UL
SODIUM SERPL-SCNC: 140 MMOL/L (ref 136–145)
T AXIS: 17 DEGREES
TRIGL SERPL-MCNC: 109 MG/DL (ref 30–149)
VENTRICULAR RATE: 66 BPM
VLDLC SERPL CALC-MCNC: 16 MG/DL (ref 0–30)
WBC # BLD AUTO: 7.8 X10(3) UL (ref 4–11)

## 2025-03-06 PROCEDURE — 80053 COMPREHEN METABOLIC PANEL: CPT

## 2025-03-06 PROCEDURE — 93010 ELECTROCARDIOGRAM REPORT: CPT | Performed by: STUDENT IN AN ORGANIZED HEALTH CARE EDUCATION/TRAINING PROGRAM

## 2025-03-06 PROCEDURE — 3044F HG A1C LEVEL LT 7.0%: CPT | Performed by: INTERNAL MEDICINE

## 2025-03-06 PROCEDURE — 3079F DIAST BP 80-89 MM HG: CPT | Performed by: INTERNAL MEDICINE

## 2025-03-06 PROCEDURE — 36415 COLL VENOUS BLD VENIPUNCTURE: CPT

## 2025-03-06 PROCEDURE — 3074F SYST BP LT 130 MM HG: CPT | Performed by: INTERNAL MEDICINE

## 2025-03-06 PROCEDURE — 82043 UR ALBUMIN QUANTITATIVE: CPT

## 2025-03-06 PROCEDURE — 82570 ASSAY OF URINE CREATININE: CPT

## 2025-03-06 PROCEDURE — 83036 HEMOGLOBIN GLYCOSYLATED A1C: CPT | Performed by: INTERNAL MEDICINE

## 2025-03-06 PROCEDURE — 99214 OFFICE O/P EST MOD 30 MIN: CPT | Performed by: INTERNAL MEDICINE

## 2025-03-06 PROCEDURE — 80061 LIPID PANEL: CPT

## 2025-03-06 PROCEDURE — 3061F NEG MICROALBUMINURIA REV: CPT | Performed by: INTERNAL MEDICINE

## 2025-03-06 PROCEDURE — 93005 ELECTROCARDIOGRAM TRACING: CPT

## 2025-03-06 PROCEDURE — 85025 COMPLETE CBC W/AUTO DIFF WBC: CPT

## 2025-03-06 RX ORDER — MELOXICAM 15 MG/1
15 TABLET ORAL DAILY
Qty: 10 TABLET | Refills: 0 | Status: SHIPPED | OUTPATIENT
Start: 2025-03-06

## 2025-03-06 RX ORDER — DULAGLUTIDE 1.5 MG/.5ML
1.5 INJECTION, SOLUTION SUBCUTANEOUS WEEKLY
Qty: 12 ML | Refills: 1 | Status: SHIPPED | OUTPATIENT
Start: 2025-03-06
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
## 2025-03-06 NOTE — TELEPHONE ENCOUNTER
Spoke with Robbin Martinez, Date of Birth verified.  They were  informed of MD recommendation and  stated understanding, see med list.

## 2025-03-06 NOTE — TELEPHONE ENCOUNTER
She should be on trulicity 1.5mg weekly only (One shot only weekly).  Her diabetes is very well controlled on this current dose.

## 2025-03-06 NOTE — TELEPHONE ENCOUNTER
Name and  verified.     Received a call from [facility] pharmacy stating the patient is saying the instructions on her Trulicity are wrong. The patient is stating Dr. Renteria informed her to take two injections a week.     Called the patient to clarify and she states she recalls being told to do two injections on  from now on. The directions state only once a week and the dosage is still 1.5MG. the patient was just seen in the office this morning.     Dr. Renteria please advise - the patient is asking if she is to two TWO injections on Sundays or remain with the ONE injection on Sundays.

## 2025-03-06 NOTE — TELEPHONE ENCOUNTER
Spoke with patient, Date of Birth verified.  Patient was informed of MD recommendation, she stated understanding.
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
## 2025-03-07 ENCOUNTER — OFFICE VISIT (OUTPATIENT)
Dept: PHYSICAL THERAPY | Age: 65
End: 2025-03-07
Attending: ORTHOPAEDIC SURGERY
Payer: COMMERCIAL

## 2025-03-07 PROCEDURE — 97112 NEUROMUSCULAR REEDUCATION: CPT | Performed by: PHYSICAL THERAPIST

## 2025-03-07 PROCEDURE — 97110 THERAPEUTIC EXERCISES: CPT | Performed by: PHYSICAL THERAPIST

## 2025-03-07 NOTE — PROGRESS NOTES
Patient: Pedro Vieyra (64 year old, female) Referring Provider:  Insurance:   Diagnosis: Traumatic incomplete tear of left rotator cuff, subsequent encounter (S47.576G) Gerry [place]  [place]   Date of Surgery: No data recorded Next MD visit:  N/A   Precautions:  None No data recorded Referral Information:    Date of Evaluation: Req: 0, Auth: 0, Exp:     02/10/25 POC Auth Visits:          Today's Date   3/7/2025      Subjective:   Patient states her shoulder is feeling better. Reports no pain when at rest.            Pain: 0/10.       Objective:  L shoulder ROM: flexion 0-150, abduction 0-130, external rotation 0-80, internal rotation 0-80  L elbow ROM is WFL into all planes    L UE strength is grossly 3+/5 into L shoulder motion. Rest of L UE grossly 5/5      Assessment:   Demonstrates improved L shoulder mobility and strength. Patient and PT goals are in progress.      Goals:     Goals        Therapy Goals       Not Met Progress Toward Partially Met Met   Patient will be independent with their home program, its progression, and management of residual symptoms. [] [] [] [x]   Patient will report pain of not more than 1/10 during activity. [] [] [x] []   Patient will improve L shoulder ROM to WFL into all planes to improve UE mobility. [] [] [x] []   Increase strength to 5/5 throughout to be able to perform previous activities without difficulty. [] [x] [] []   Patient will verbalize and demonstrate strategies to improve posture and body mechanics during activity. [] [] [x] []   Patient will resume all previous activities including lifting, carrying, and reaching.   [] [x] [] []                   Plan:   Continue PT to improve mobility     3/7/2025 3/5/2025 2/28/2025 2/13/2025     Treatment Day 5/ [place]   Thera Ex   X38min  - B shoulder extension and behind the back stretch AROM 10 x 2  - supine forward press with 5# wt bar 10 x 3  - supine B shoulder  overhead flexion with 2# wt bar 10 x 2  - supine B shoulder horizontal abduction with 1lb wt 10 x 2  - sidelying L shoulder external rotation, scaption with 1lb db 10 x 3  - prone shoulder extension, single arm rows 2lb db's 10 x 2  - shoulder horizontal abduction, flexion AROM 10 x 2  - shoulder rows with 20# cable resistance 10 x 3  - shoulder extensions with 10# cable resistance 10 x 3  - L shoulder external and internal rotation with red theraband 10 x 2  - B shoulder flexion, scaption with 1lb 10 x 2  - UBE x 2min fwd/2min bwd   X38min  - B shoulder extension and behind the back stretch AROM 10 x 2  - supine forward press with 5# wt bar 10 x 3  - supine B shoulder overhead flexion with 2# wt bar 10 x 2  - supine B shoulder horizontal abduction AROM  - sidelying L shoulder external rotation, scaption with 1lb db 10 x 3  - prone shoulder extension, single arm rows 2lb db's 10 x 2  - shoulder stretch into flexion against wall 10 x 2  - shoulder rows with 20# cable resistance 10 x 3  - shoulder extensions with 10# cable resistance 10 x 2  - L shoulder external and internal rotation with red theraband 10 x 2   X40min  - B shoulder extension and behind the back stretch AROM 10 x 2  - B shoulder behind the back stretch  - supine B shoulder overhead flexion with 2# wt bar 10 x 2  - sidelying R shoulder external rotation with 1lb db 10 x 2  - sidelying L shoulder scaption AROM 10 x 2  - prone shoulder extension 10 x 2  - prone shoulder horizontal abduction, flexion AROM 10 x 2  - shoulder stretch into flexion 10 x 2  - neuro re-ed  -    X25min  - B shoulder extension and behind the back stretch AROM 10 x 2  - manual PT  - supine overhead B shoulder flexion with stick 10 x 2  - supine B shoulder horizontal abduction with 1lb db 10 x 2  - B shoulder rows 15# 10 x 3  - B shoulder extensions 10# 10 x 3  - L shoulder external rotation with yellow theraband 10 x 2  - L shoulder internal rotation with yellow theraband 10 x  2  - neuro re-ed     Neuromuscular Re-education   X10min  - swiss ball wall roll ups 10 x 2  - swiss ball wall push ups 10 x 2  - Multi-pull single UE shoulder rows 10# 10 x 2 X10min  - swiss ball wall roll ups 10 x 2  - swiss ball wall push ups 10 x 2  - Multi-pull single UE shoulder rows 10# 10 x 2 X10min  - swiss ball wall roll ups 10 x 2  - swiss ball wall push ups 10 x 2  - Multi-pull single UE shoulder row 10# 10 x 2 X10min  - swiss ball wall roll ups 10 x 2  - swiss ball wall push ups 10 x 2  - Multi-pull single UE shoulder row 10# 10 x 2     Manual PT      X10min  - L shoulder mobilization into all planes with joint glides     Modalities                           (Time in minutes)       Therapeutic Exercises   40min 38min 40min 25min   Neuro Re-education   10min 10min 10min 10min   Manual PT      10min   Therapeutic Activity         Gait Training         Total of Timed Procedures 50min 48min 45min 45min     Total of Service-Based Procedures       Total Treatment Time 50min 48min 45min 45min         Charges: EX3, Neuro re-ed1,

## 2025-03-10 ENCOUNTER — TELEPHONE (OUTPATIENT)
Dept: INTERNAL MEDICINE CLINIC | Facility: CLINIC | Age: 65
End: 2025-03-10

## 2025-03-10 NOTE — TELEPHONE ENCOUNTER
Faxed pre-op clearance to Dr. Yang Brennan to fax number [phone]. Confirmation has been received that fax went through successfully.

## 2025-03-12 ENCOUNTER — LAB ENCOUNTER (OUTPATIENT)
Dept: LAB | Age: 65
End: 2025-03-12
Attending: ORTHOPAEDIC SURGERY
Payer: COMMERCIAL

## 2025-03-12 DIAGNOSIS — Z01.818 PREOP TESTING: ICD-10-CM

## 2025-03-12 DIAGNOSIS — Z51.81 MEDICATION MONITORING ENCOUNTER: ICD-10-CM

## 2025-03-12 DIAGNOSIS — M10.072 ACUTE IDIOPATHIC GOUT OF LEFT FOOT: ICD-10-CM

## 2025-03-12 LAB
ALBUMIN SERPL-MCNC: 4.5 G/DL (ref 3.2–4.8)
ALBUMIN/GLOB SERPL: 1.8 {RATIO} (ref 1–2)
ALP LIVER SERPL-CCNC: 79 U/L
ALT SERPL-CCNC: 44 U/L
ANION GAP SERPL CALC-SCNC: 8 MMOL/L (ref 0–18)
AST SERPL-CCNC: 39 U/L (ref ?–34)
BILIRUB SERPL-MCNC: 0.4 MG/DL (ref 0.2–1.1)
BUN BLD-MCNC: 17 MG/DL (ref 9–23)
BUN/CREAT SERPL: 22.4 (ref 10–20)
CALCIUM BLD-MCNC: 9 MG/DL (ref 8.7–10.4)
CHLORIDE SERPL-SCNC: 108 MMOL/L (ref 98–112)
CO2 SERPL-SCNC: 27 MMOL/L (ref 21–32)
CREAT BLD-MCNC: 0.76 MG/DL
EGFRCR SERPLBLD CKD-EPI 2021: 88 ML/MIN/1.73M2 (ref 60–?)
FASTING STATUS PATIENT QL REPORTED: YES
GLOBULIN PLAS-MCNC: 2.5 G/DL (ref 2–3.5)
GLUCOSE BLD-MCNC: 100 MG/DL (ref 70–99)
OSMOLALITY SERPL CALC.SUM OF ELEC: 298 MOSM/KG (ref 275–295)
POTASSIUM SERPL-SCNC: 4 MMOL/L (ref 3.5–5.1)
PROT SERPL-MCNC: 7 G/DL (ref 5.7–8.2)
SODIUM SERPL-SCNC: 143 MMOL/L (ref 136–145)
URATE SERPL-MCNC: 5.4 MG/DL

## 2025-03-12 PROCEDURE — 36415 COLL VENOUS BLD VENIPUNCTURE: CPT

## 2025-03-12 PROCEDURE — 80323 ALKALOIDS NOS: CPT

## 2025-03-12 PROCEDURE — 84550 ASSAY OF BLOOD/URIC ACID: CPT

## 2025-03-12 PROCEDURE — 80053 COMPREHEN METABOLIC PANEL: CPT

## 2025-03-12 PROCEDURE — 87641 MR-STAPH DNA AMP PROBE: CPT

## 2025-03-13 ENCOUNTER — OFFICE VISIT (OUTPATIENT)
Dept: PHYSICAL THERAPY | Age: 65
End: 2025-03-13
Attending: ORTHOPAEDIC SURGERY
Payer: COMMERCIAL

## 2025-03-13 LAB — MRSA DNA SPEC QL NAA+PROBE: NEGATIVE

## 2025-03-13 PROCEDURE — 97112 NEUROMUSCULAR REEDUCATION: CPT | Performed by: PHYSICAL THERAPIST

## 2025-03-13 PROCEDURE — 97110 THERAPEUTIC EXERCISES: CPT | Performed by: PHYSICAL THERAPIST

## 2025-03-13 NOTE — PROGRESS NOTES
Patient: Pedro Vieyra (64 year old, female) Referring Provider:  Insurance:   Diagnosis: Traumatic incomplete tear of left rotator cuff, subsequent encounter (S43.572U) [place]   Date of Surgery: No data recorded Next MD visit:  N/A   Precautions:  None No data recorded Referral Information:    Date of Evaluation: Req: 0, Auth: 0, Exp:     02/10/25 POC Auth Visits:          Today's Date   3/13/2025      Subjective:   Patient states her shoulder is feeling better. Reports no pain when at rest. Residual pain up to 3/10 during overhead reaching.            Pain: 0/10 at rest      Objective:  L shoulder ROM: flexion 0-180, abduction 0-108, external rotation 0-90, internal rotation 0-80  L elbow ROM is WFL into all planes    L UE strength is grossly 4/5 into L shoulder motion. Rest of L UE grossly 5/5      Assessment:   Referred to PT due to R shoulder pain. Patient has completed 6 PT visits. States her shoulder is feeling much better. Reports no pain when at rest. L shoulder ROM is now WFL into all planes. Strength is also WFL throughout. Patient states she is still careful with lifting and reaching activities due to residual pain. Patient and PT goals has been met. Patient is independent with her home exercises, its progression, and management of residual symptoms.      Goals:     Goals        Therapy Goals       Not Met Progress Toward Partially Met Met   Patient will be independent with their home program, its progression, and management of residual symptoms. [] [] [] [x]   Patient will report pain of not more than 1/10 during activity. [] [] [x] []   Patient will improve L shoulder ROM to WFL into all planes to improve UE mobility. [] [] [x] []   Increase strength to 5/5 throughout to be able to perform previous activities without difficulty. [] [x] [] []   Patient will verbalize and demonstrate strategies to improve posture and body mechanics during activity. [] [] [x] []   Patient will  resume all previous activities including lifting, carrying, and reaching.   [] [x] [] []                    3/13/2025 3/7/2025 3/5/2025 2/28/2025 2/13/2025     Treatment Day  5[place]   Thera Ex   X38min  - B shoulder extension, behind the back reach 20x  - supine B shoulder forward press with 4lb wt bar  - supine overhead B shoulder flexion with 2# wt bar 10 x 3  - supine horizontal abduction AROM 10 x 2  - sidelying L shoulder external rotation with 1lb db 10 x 2  - sidelying L shoulder scaption 1lb db 10 x 2  - prone lying L shoulder single row, shoulder extension with 2lb db 10 x 2  - prone L shoulder flexion, horizontal abduction AROM 20x  - B shoulder rows, extensions 10# cable resistance 10 x 3  - black theracord shoulder external rotation, internal rotation 10 x 2 X38min  - B shoulder extension and behind the back stretch AROM 10 x 2  - supine forward press with 5# wt bar 10 x 3  - supine B shoulder overhead flexion with 2# wt bar 10 x 2  - supine B shoulder horizontal abduction with 1lb wt 10 x 2  - sidelying L shoulder external rotation, scaption with 1lb db 10 x 3  - prone shoulder extension, single arm rows 2lb db's 10 x 2  - shoulder horizontal abduction, flexion AROM 10 x 2  - shoulder rows with 20# cable resistance 10 x 3  - shoulder extensions with 10# cable resistance 10 x 3  - L shoulder external and internal rotation with red theraband 10 x 2  - B shoulder flexion, scaption with 1lb 10 x 2  - UBE x 2min fwd/2min bwd   X38min  - B shoulder extension and behind the back stretch AROM 10 x 2  - supine forward press with 5# wt bar 10 x 3  - supine B shoulder overhead flexion with 2# wt bar 10 x 2  - supine B shoulder horizontal abduction AROM  - sidelying L shoulder external rotation, scaption with 1lb db 10 x 3  - prone shoulder extension, single arm rows 2lb db's 10 x 2  - shoulder stretch into flexion against wall 10 x 2  - shoulder rows with 20#  cable resistance 10 x 3  - shoulder extensions with 10# cable resistance 10 x 2  - L shoulder external and internal rotation with red theraband 10 x 2   X40min  - B shoulder extension and behind the back stretch AROM 10 x 2  - B shoulder behind the back stretch  - supine B shoulder overhead flexion with 2# wt bar 10 x 2  - sidelying R shoulder external rotation with 1lb db 10 x 2  - sidelying L shoulder scaption AROM 10 x 2  - prone shoulder extension 10 x 2  - prone shoulder horizontal abduction, flexion AROM 10 x 2  - shoulder stretch into flexion 10 x 2  - neuro re-ed  -    X25min  - B shoulder extension and behind the back stretch AROM 10 x 2  - manual PT  - supine overhead B shoulder flexion with stick 10 x 2  - supine B shoulder horizontal abduction with 1lb db 10 x 2  - B shoulder rows 15# 10 x 3  - B shoulder extensions 10# 10 x 3  - L shoulder external rotation with yellow theraband 10 x 2  - L shoulder internal rotation with yellow theraband 10 x 2  - neuro re-ed     Neuromuscular Re-education   X10min  - swiss ball wall roll ups 10 x 2  - swiss ball wall push ups 10 x 2  - Multi-pull single UE shoulder rows 10# 10 x 2 X10min  - swiss ball wall roll ups 10 x 2  - swiss ball wall push ups 10 x 2  - Multi-pull single UE shoulder rows 10# 10 x 2 X10min  - swiss ball wall roll ups 10 x 2  - swiss ball wall push ups 10 x 2  - Multi-pull single UE shoulder rows 10# 10 x 2 X10min  - swiss ball wall roll ups 10 x 2  - swiss ball wall push ups 10 x 2  - Multi-pull single UE shoulder row 10# 10 x 2 X10min  - swiss ball wall roll ups 10 x 2  - swiss ball wall push ups 10 x 2  - Multi-pull single UE shoulder row 10# 10 x 2     Manual PT       X10min  - L shoulder mobilization into all planes with joint glides     Modalities                              (Time in minutes)        Therapeutic Exercises   40min 40min 38min 40min 25min   Neuro Re-education   10min 10min 10min 10min 10min   Manual PT       10min    Therapeutic Activity          Gait Training          Total of Timed Procedures 50min 50min 48min 45min 45min     Total of Service-Based Procedures        Total Treatment Time 50min 50min 48min 45min 45min         Charges: EX3, Neuro re-ed1,

## 2025-03-14 NOTE — CM/SW NOTE
SW was notified the pt. Has been pre-operatively arranged with [facility].    Referral, order and face to face to be sent via [facility] post op.     MAURY Jarquin ext [phone]

## 2025-03-18 LAB
COTININE: <1 NG/ML
NICOTINE: <1 NG/ML

## 2025-03-19 ENCOUNTER — HOSPITAL ENCOUNTER (OUTPATIENT)
Facility: HOSPITAL | Age: 65
Discharge: HOME | End: 2025-03-20
Attending: ORTHOPAEDIC SURGERY | Admitting: ORTHOPAEDIC SURGERY
Payer: COMMERCIAL

## 2025-03-19 ENCOUNTER — ANESTHESIA EVENT (OUTPATIENT)
Dept: SURGERY | Facility: HOSPITAL | Age: 65
End: 2025-03-19
Payer: COMMERCIAL

## 2025-03-19 ENCOUNTER — ANESTHESIA (OUTPATIENT)
Dept: SURGERY | Facility: HOSPITAL | Age: 65
End: 2025-03-19
Payer: COMMERCIAL

## 2025-03-19 ENCOUNTER — APPOINTMENT (OUTPATIENT)
Dept: GENERAL RADIOLOGY | Facility: HOSPITAL | Age: 65
End: 2025-03-19
Attending: PHYSICIAN ASSISTANT
Payer: COMMERCIAL

## 2025-03-19 DIAGNOSIS — Z01.818 PREOP TESTING: Primary | ICD-10-CM

## 2025-03-19 DIAGNOSIS — M17.11 PRIMARY OSTEOARTHRITIS OF RIGHT KNEE: ICD-10-CM

## 2025-03-19 PROBLEM — G47.33 OSA (OBSTRUCTIVE SLEEP APNEA): Status: ACTIVE | Noted: 2025-03-19

## 2025-03-19 LAB
GLUCOSE BLDC GLUCOMTR-MCNC: 116 MG/DL (ref 70–99)
GLUCOSE BLDC GLUCOMTR-MCNC: 133 MG/DL (ref 70–99)
GLUCOSE BLDC GLUCOMTR-MCNC: 171 MG/DL (ref 70–99)
GLUCOSE BLDC GLUCOMTR-MCNC: 186 MG/DL (ref 70–99)

## 2025-03-19 PROCEDURE — 8E0Y0CZ ROBOTIC ASSISTED PROCEDURE OF LOWER EXTREMITY, OPEN APPROACH: ICD-10-PCS | Performed by: ORTHOPAEDIC SURGERY

## 2025-03-19 PROCEDURE — 99204 OFFICE O/P NEW MOD 45 MIN: CPT | Performed by: HOSPITALIST

## 2025-03-19 PROCEDURE — 73560 X-RAY EXAM OF KNEE 1 OR 2: CPT | Performed by: PHYSICIAN ASSISTANT

## 2025-03-19 PROCEDURE — 0SRC0J9 REPLACEMENT OF RIGHT KNEE JOINT WITH SYNTHETIC SUBSTITUTE, CEMENTED, OPEN APPROACH: ICD-10-PCS | Performed by: ORTHOPAEDIC SURGERY

## 2025-03-19 DEVICE — IMPLANTABLE DEVICE
Type: IMPLANTABLE DEVICE | Site: KNEE | Status: FUNCTIONAL
Brand: PERSONA®

## 2025-03-19 DEVICE — IMPLANTABLE DEVICE
Type: IMPLANTABLE DEVICE | Site: KNEE | Status: FUNCTIONAL
Brand: PERSONA® NATURAL TIBIA®

## 2025-03-19 DEVICE — IMPLANTABLE DEVICE
Type: IMPLANTABLE DEVICE | Site: KNEE | Status: FUNCTIONAL
Brand: PERSONA® VIVACIT-E®

## 2025-03-19 DEVICE — IMPLANTABLE DEVICE
Type: IMPLANTABLE DEVICE | Site: KNEE | Status: FUNCTIONAL
Brand: REFOBACIN® BONE CEMENT R

## 2025-03-19 RX ORDER — DEXTROSE MONOHYDRATE 25 G/50ML
50 INJECTION, SOLUTION INTRAVENOUS
Status: DISCONTINUED | OUTPATIENT
Start: 2025-03-19 | End: 2025-03-19 | Stop reason: HOSPADM

## 2025-03-19 RX ORDER — PROCHLORPERAZINE EDISYLATE 5 MG/ML
5 INJECTION INTRAMUSCULAR; INTRAVENOUS EVERY 8 HOURS PRN
Status: DISCONTINUED | OUTPATIENT
Start: 2025-03-19 | End: 2025-03-19 | Stop reason: HOSPADM

## 2025-03-19 RX ORDER — ASPIRIN 81 MG/1
81 TABLET ORAL 2 TIMES DAILY
Status: DISCONTINUED | OUTPATIENT
Start: 2025-03-19 | End: 2025-03-20

## 2025-03-19 RX ORDER — DIPHENHYDRAMINE HCL 25 MG
25 CAPSULE ORAL EVERY 4 HOURS PRN
Status: DISCONTINUED | OUTPATIENT
Start: 2025-03-19 | End: 2025-03-20

## 2025-03-19 RX ORDER — NICOTINE POLACRILEX 4 MG
15 LOZENGE BUCCAL
Status: DISCONTINUED | OUTPATIENT
Start: 2025-03-19 | End: 2025-03-19 | Stop reason: HOSPADM

## 2025-03-19 RX ORDER — SODIUM CHLORIDE, SODIUM LACTATE, POTASSIUM CHLORIDE, CALCIUM CHLORIDE 600; 310; 30; 20 MG/100ML; MG/100ML; MG/100ML; MG/100ML
INJECTION, SOLUTION INTRAVENOUS CONTINUOUS
Status: DISCONTINUED | OUTPATIENT
Start: 2025-03-19 | End: 2025-03-20

## 2025-03-19 RX ORDER — ONDANSETRON 2 MG/ML
4 INJECTION INTRAMUSCULAR; INTRAVENOUS EVERY 6 HOURS PRN
Status: DISCONTINUED | OUTPATIENT
Start: 2025-03-19 | End: 2025-03-19 | Stop reason: HOSPADM

## 2025-03-19 RX ORDER — OXYCODONE HYDROCHLORIDE 5 MG/1
5 TABLET ORAL EVERY 4 HOURS PRN
Status: DISCONTINUED | OUTPATIENT
Start: 2025-03-19 | End: 2025-03-20

## 2025-03-19 RX ORDER — METOCLOPRAMIDE HYDROCHLORIDE 5 MG/ML
10 INJECTION INTRAMUSCULAR; INTRAVENOUS ONCE
Status: COMPLETED | OUTPATIENT
Start: 2025-03-19 | End: 2025-03-19

## 2025-03-19 RX ORDER — TRANEXAMIC ACID 650 MG/1
1300 TABLET ORAL ONCE
Status: COMPLETED | OUTPATIENT
Start: 2025-03-19 | End: 2025-03-19

## 2025-03-19 RX ORDER — NICOTINE POLACRILEX 4 MG
30 LOZENGE BUCCAL
Status: DISCONTINUED | OUTPATIENT
Start: 2025-03-19 | End: 2025-03-19 | Stop reason: HOSPADM

## 2025-03-19 RX ORDER — HYDROMORPHONE HYDROCHLORIDE 1 MG/ML
0.8 INJECTION, SOLUTION INTRAMUSCULAR; INTRAVENOUS; SUBCUTANEOUS EVERY 2 HOUR PRN
Status: DISCONTINUED | OUTPATIENT
Start: 2025-03-19 | End: 2025-03-20

## 2025-03-19 RX ORDER — METHOCARBAMOL 100 MG/ML
1000 INJECTION, SOLUTION INTRAMUSCULAR; INTRAVENOUS ONCE
Status: COMPLETED | OUTPATIENT
Start: 2025-03-19 | End: 2025-03-19

## 2025-03-19 RX ORDER — METOCLOPRAMIDE 10 MG/1
10 TABLET ORAL ONCE
Status: COMPLETED | OUTPATIENT
Start: 2025-03-19 | End: 2025-03-19

## 2025-03-19 RX ORDER — SODIUM PHOSPHATE, DIBASIC AND SODIUM PHOSPHATE, MONOBASIC 7; 19 G/230ML; G/230ML
1 ENEMA RECTAL ONCE AS NEEDED
Status: DISCONTINUED | OUTPATIENT
Start: 2025-03-19 | End: 2025-03-20

## 2025-03-19 RX ORDER — LIDOCAINE HYDROCHLORIDE 10 MG/ML
INJECTION, SOLUTION INFILTRATION; PERINEURAL
Status: COMPLETED | OUTPATIENT
Start: 2025-03-19 | End: 2025-03-19

## 2025-03-19 RX ORDER — HYDROMORPHONE HYDROCHLORIDE 1 MG/ML
0.4 INJECTION, SOLUTION INTRAMUSCULAR; INTRAVENOUS; SUBCUTANEOUS EVERY 5 MIN PRN
Status: DISCONTINUED | OUTPATIENT
Start: 2025-03-19 | End: 2025-03-19 | Stop reason: HOSPADM

## 2025-03-19 RX ORDER — EPHEDRINE SULFATE 50 MG/ML
INJECTION INTRAVENOUS AS NEEDED
Status: DISCONTINUED | OUTPATIENT
Start: 2025-03-19 | End: 2025-03-19 | Stop reason: SURG

## 2025-03-19 RX ORDER — MORPHINE SULFATE 4 MG/ML
2 INJECTION, SOLUTION INTRAMUSCULAR; INTRAVENOUS EVERY 10 MIN PRN
Status: DISCONTINUED | OUTPATIENT
Start: 2025-03-19 | End: 2025-03-19 | Stop reason: HOSPADM

## 2025-03-19 RX ORDER — BISACODYL 10 MG
10 SUPPOSITORY, RECTAL RECTAL
Status: DISCONTINUED | OUTPATIENT
Start: 2025-03-19 | End: 2025-03-20

## 2025-03-19 RX ORDER — LOSARTAN POTASSIUM 50 MG/1
50 TABLET ORAL EVERY MORNING
Status: DISCONTINUED | OUTPATIENT
Start: 2025-03-20 | End: 2025-03-20

## 2025-03-19 RX ORDER — PANTOPRAZOLE SODIUM 40 MG/1
40 TABLET, DELAYED RELEASE ORAL
Status: DISCONTINUED | OUTPATIENT
Start: 2025-03-20 | End: 2025-03-20

## 2025-03-19 RX ORDER — FAMOTIDINE 10 MG/ML
20 INJECTION, SOLUTION INTRAVENOUS ONCE
Status: COMPLETED | OUTPATIENT
Start: 2025-03-19 | End: 2025-03-19

## 2025-03-19 RX ORDER — MORPHINE SULFATE 4 MG/ML
4 INJECTION, SOLUTION INTRAMUSCULAR; INTRAVENOUS EVERY 10 MIN PRN
Status: DISCONTINUED | OUTPATIENT
Start: 2025-03-19 | End: 2025-03-19 | Stop reason: HOSPADM

## 2025-03-19 RX ORDER — POLYETHYLENE GLYCOL 3350 17 G/17G
17 POWDER, FOR SOLUTION ORAL DAILY PRN
Status: DISCONTINUED | OUTPATIENT
Start: 2025-03-19 | End: 2025-03-20

## 2025-03-19 RX ORDER — ACETAMINOPHEN 500 MG
1000 TABLET ORAL EVERY 8 HOURS
Qty: 60 TABLET | Refills: 0 | Status: SHIPPED | OUTPATIENT
Start: 2025-03-19

## 2025-03-19 RX ORDER — DIPHENHYDRAMINE HYDROCHLORIDE 50 MG/ML
12.5 INJECTION, SOLUTION INTRAMUSCULAR; INTRAVENOUS EVERY 4 HOURS PRN
Status: DISCONTINUED | OUTPATIENT
Start: 2025-03-19 | End: 2025-03-20

## 2025-03-19 RX ORDER — ALLOPURINOL 100 MG/1
100 TABLET ORAL DAILY
Status: DISCONTINUED | OUTPATIENT
Start: 2025-03-20 | End: 2025-03-20

## 2025-03-19 RX ORDER — FAMOTIDINE 20 MG/1
20 TABLET, FILM COATED ORAL ONCE
Status: COMPLETED | OUTPATIENT
Start: 2025-03-19 | End: 2025-03-19

## 2025-03-19 RX ORDER — DIPHENHYDRAMINE HYDROCHLORIDE 50 MG/ML
25 INJECTION, SOLUTION INTRAMUSCULAR; INTRAVENOUS ONCE AS NEEDED
Status: ACTIVE | OUTPATIENT
Start: 2025-03-19 | End: 2025-03-19

## 2025-03-19 RX ORDER — ASPIRIN 81 MG/1
81 TABLET ORAL 2 TIMES DAILY
Qty: 60 TABLET | Refills: 0 | Status: SHIPPED | OUTPATIENT
Start: 2025-03-19

## 2025-03-19 RX ORDER — DOCUSATE SODIUM 100 MG/1
100 CAPSULE, LIQUID FILLED ORAL 2 TIMES DAILY
Status: DISCONTINUED | OUTPATIENT
Start: 2025-03-19 | End: 2025-03-20

## 2025-03-19 RX ORDER — BUPIVACAINE HYDROCHLORIDE 7.5 MG/ML
INJECTION, SOLUTION INTRASPINAL
Status: COMPLETED | OUTPATIENT
Start: 2025-03-19 | End: 2025-03-19

## 2025-03-19 RX ORDER — HYDROMORPHONE HYDROCHLORIDE 1 MG/ML
0.4 INJECTION, SOLUTION INTRAMUSCULAR; INTRAVENOUS; SUBCUTANEOUS EVERY 2 HOUR PRN
Status: DISCONTINUED | OUTPATIENT
Start: 2025-03-19 | End: 2025-03-20

## 2025-03-19 RX ORDER — HYDROMORPHONE HYDROCHLORIDE 1 MG/ML
0.2 INJECTION, SOLUTION INTRAMUSCULAR; INTRAVENOUS; SUBCUTANEOUS EVERY 5 MIN PRN
Status: DISCONTINUED | OUTPATIENT
Start: 2025-03-19 | End: 2025-03-19 | Stop reason: HOSPADM

## 2025-03-19 RX ORDER — OXYCODONE HYDROCHLORIDE 5 MG/1
10 TABLET ORAL EVERY 4 HOURS PRN
Status: DISCONTINUED | OUTPATIENT
Start: 2025-03-19 | End: 2025-03-20

## 2025-03-19 RX ORDER — PROCHLORPERAZINE EDISYLATE 5 MG/ML
5 INJECTION INTRAMUSCULAR; INTRAVENOUS EVERY 8 HOURS PRN
Status: DISCONTINUED | OUTPATIENT
Start: 2025-03-19 | End: 2025-03-20

## 2025-03-19 RX ORDER — ROPIVACAINE HYDROCHLORIDE 5 MG/ML
INJECTION, SOLUTION EPIDURAL; INFILTRATION; PERINEURAL
Status: COMPLETED | OUTPATIENT
Start: 2025-03-19 | End: 2025-03-19

## 2025-03-19 RX ORDER — ONDANSETRON 2 MG/ML
4 INJECTION INTRAMUSCULAR; INTRAVENOUS EVERY 6 HOURS PRN
Status: DISCONTINUED | OUTPATIENT
Start: 2025-03-19 | End: 2025-03-20

## 2025-03-19 RX ORDER — ACETAMINOPHEN 500 MG
1000 TABLET ORAL EVERY 8 HOURS
Status: DISCONTINUED | OUTPATIENT
Start: 2025-03-19 | End: 2025-03-20

## 2025-03-19 RX ORDER — CELECOXIB 200 MG/1
200 CAPSULE ORAL 2 TIMES DAILY
Status: DISCONTINUED | OUTPATIENT
Start: 2025-03-19 | End: 2025-03-20

## 2025-03-19 RX ORDER — HYDROCHLOROTHIAZIDE 12.5 MG/1
12.5 TABLET ORAL EVERY MORNING
Status: DISCONTINUED | OUTPATIENT
Start: 2025-03-20 | End: 2025-03-20

## 2025-03-19 RX ORDER — NALOXONE HYDROCHLORIDE 0.4 MG/ML
0.08 INJECTION, SOLUTION INTRAMUSCULAR; INTRAVENOUS; SUBCUTANEOUS AS NEEDED
Status: ACTIVE | OUTPATIENT
Start: 2025-03-19 | End: 2025-03-19

## 2025-03-19 RX ORDER — ROSUVASTATIN CALCIUM 10 MG/1
20 TABLET, COATED ORAL NIGHTLY
Status: DISCONTINUED | OUTPATIENT
Start: 2025-03-19 | End: 2025-03-20

## 2025-03-19 RX ORDER — OXYCODONE HYDROCHLORIDE 5 MG/1
5 TABLET ORAL EVERY 6 HOURS PRN
Qty: 28 TABLET | Refills: 0 | Status: SHIPPED | OUTPATIENT
Start: 2025-03-19

## 2025-03-19 RX ORDER — SENNOSIDES 8.6 MG
17.2 TABLET ORAL NIGHTLY
Status: DISCONTINUED | OUTPATIENT
Start: 2025-03-19 | End: 2025-03-20

## 2025-03-19 RX ORDER — CELECOXIB 200 MG/1
200 CAPSULE ORAL 2 TIMES DAILY
Qty: 60 CAPSULE | Refills: 0 | Status: SHIPPED | OUTPATIENT
Start: 2025-03-19

## 2025-03-19 RX ORDER — HYDROMORPHONE HYDROCHLORIDE 1 MG/ML
0.6 INJECTION, SOLUTION INTRAMUSCULAR; INTRAVENOUS; SUBCUTANEOUS EVERY 5 MIN PRN
Status: DISCONTINUED | OUTPATIENT
Start: 2025-03-19 | End: 2025-03-19 | Stop reason: HOSPADM

## 2025-03-19 RX ORDER — ACETAMINOPHEN 500 MG
1000 TABLET ORAL ONCE
Status: COMPLETED | OUTPATIENT
Start: 2025-03-19 | End: 2025-03-19

## 2025-03-19 RX ORDER — MORPHINE SULFATE 10 MG/ML
6 INJECTION, SOLUTION INTRAMUSCULAR; INTRAVENOUS EVERY 10 MIN PRN
Status: DISCONTINUED | OUTPATIENT
Start: 2025-03-19 | End: 2025-03-19 | Stop reason: HOSPADM

## 2025-03-19 RX ADMIN — BUPIVACAINE HYDROCHLORIDE 1.5 ML: 7.5 INJECTION, SOLUTION INTRASPINAL at 07:39:00

## 2025-03-19 RX ADMIN — EPHEDRINE SULFATE 10 MG: 50 INJECTION INTRAVENOUS at 08:40:00

## 2025-03-19 RX ADMIN — LIDOCAINE HYDROCHLORIDE 5 ML: 10 INJECTION, SOLUTION INFILTRATION; PERINEURAL at 07:39:00

## 2025-03-19 RX ADMIN — ROPIVACAINE HYDROCHLORIDE 30 ML: 5 INJECTION, SOLUTION EPIDURAL; INFILTRATION; PERINEURAL at 07:09:00

## 2025-03-19 NOTE — ANESTHESIA POSTPROCEDURE EVALUATION
Patient: Pedro Vieyra    Procedure Summary       Date: 03/19/25 Room / Location: [facility] MAIN OR [address] [place] [address] MAIN OR    Anesthesia Start: 0734 Anesthesia Stop: 0954    Procedure: Right total knee arthroplasty (Right: Knee) Diagnosis: (Primary osteoarthritis of right knee)    Surgeons: Yang Brennan MD Anesthesiologist: Jhon Jimenez MD    Anesthesia Type: spinal, regional ASA Status: 3            Anesthesia Type: spinal, regional    Vitals Value Taken Time   /63 03/19/25 1219   Temp 97.4 °F (36.3 °C) 03/19/25 0949   Pulse 77 03/19/25 1219   Resp 16 03/19/25 1219   SpO2 98 % 03/19/25 1219       [facility] AN Post Evaluation:   Patient Evaluated in PACU  Patient Participation: complete - patient participated  Level of Consciousness: awake and awake and alert  Pain Score: 2  Pain Management: adequate  Airway Patency:patent  Dental exam unchanged from preop  Yes    Nausea/Vomiting: none  Cardiovascular Status: acceptable, blood pressure returned to baseline and hemodynamically stable  Respiratory Status: acceptable, unassisted and spontaneous ventilation  Postoperative Hydration stable      Jhon Jimenez MD  3/19/2025 12:39 PM

## 2025-03-19 NOTE — PHYSICAL THERAPY NOTE
PHYSICAL THERAPY KNEE EVALUATION - INPATIENT      [facility]  Evaluation Date: 3/19/2025  Type of Evaluation: Initial  Physician Order: PT Eval and Treat    Presenting Problem: s/p R TKA on 3/19     Reason for Therapy: Mobility Dysfunction and Discharge Planning    PHYSICAL THERAPY ASSESSMENT   Patient is a 64 year old female admitted 3/19/2025 for R TKA.  Prior to admission, patient's baseline is independent with ADLs and functional mobility without assistive device.  Patient is currently functioning below baseline with bed mobility, transfers, gait, stair negotiation, standing prolonged periods, and performing household tasks.  Patient is requiring contact guard assist as a result of the following impairments: decreased functional strength, pain, impaired dynamic standing balance, decreased muscular endurance, medical status, and limited R knee ROM.  Physical Therapy will continue to follow for duration of hospitalization.    Patient will benefit from continued skilled PT Services at discharge to promote prior level of function and safety with additional support and return home with home health PT.    PLAN DURING HOSPITALIZATION  Nursing Mobility Recommendation : 1 Assist  PT Device Recommendation: Rolling walker  PT Treatment Plan: Bed mobility, Body mechanics, Endurance, Energy conservation, Patient education, Family education, Gait training, Strengthening, Stair training, Transfer training, Balance training  Rehab Potential : Good  Frequency (Obs): BID     PHYSICAL THERAPY MEDICAL/SOCIAL HISTORY     Problem List  Principal Problem:    Primary osteoarthritis of right knee  Active Problems:    Essential hypertension    Diabetes mellitus type 2 without retinopathy (HCC)    Hypercholesteremia    GRETTA (obstructive sleep apnea)    HOME SITUATION  Type of Home: Apartment  Home Layout: One level  Stairs to Enter : 14   Railing: Yes    Lives With: Spouse (mother-in-law)    Drives: Yes   Patient  Regularly Uses: None      Prior Level of [place]: independent     SUBJECTIVE  Agreeable to activity.     PHYSICAL THERAPY EXAMINATION   OBJECTIVE  Precautions: None  Fall Risk: Standard fall risk    WEIGHT BEARING RESTRICTION  R Lower Extremity: Weight Bearing as Tolerated    PAIN ASSESSMENT  Rating:  (did not rate)  Location: right knee  Management Techniques: Activity promotion, Body mechanics, Breathing techniques, Repositioning    COGNITION  Overall Cognitive Status:  WFL - within functional limits    RANGE OF MOTION AND STRENGTH ASSESSMENT  Upper extremity ROM and strength are within functional limits.  Lower extremity ROM is within functional limits.  Lower extremity strength is within functional limits.    BALANCE  Static Sitting: Good  Dynamic Sitting: Fair +  Static Standing: Fair -  Dynamic Standing: Fair -                                                                         AM-PAC '6-Clicks' INPATIENT SHORT FORM - BASIC MOBILITY  How much difficulty does the patient currently have...  Patient Difficulty: Turning over in bed (including adjusting bedclothes, sheets and blankets)?: A Little   Patient Difficulty: Sitting down on and standing up from a chair with arms (e.g., wheelchair, bedside commode, etc.): A Little   Patient Difficulty: Moving from lying on back to sitting on the side of the bed?: A Little   How much help from another person does the patient currently need...   Help from Another: Moving to and from a bed to a chair (including a wheelchair)?: A Little   Help from Another: Need to walk in hospital room?: A Little   Help from Another: Climbing 3-5 steps with a railing?: A Little     AM-PAC Score:  Raw Score: 18   Approx Degree of Impairment: 46.58%   Standardized Score (AM-PAC Scale): 43.63   CMS Modifier (G-Code): CK     FUNCTIONAL ABILITY STATUS  Functional Mobility/Gait Assessment  Gait Assistance: Contact guard assist  Distance (ft): 15  Assistive Device: Rolling walker  Pattern:  Shuffle, R Decreased stance time (slow,guarded, step-to pattern)  Supine to Sit: contact guard assist for RLE support due to pain  Sit to Stand: contact guard assist to/from bed and chair    Exercise/Education Provided:  Education Provided To: Patient, Family/Caregiver     Patient Education: Role of Physical Therapy, Plan of Care, DME Recommendations, Functional Transfer Techniques, Fall Prevention, Weight Bear Status, Surgical Precautions, Posture/Positioning, Energy Conservation, Proper Body Mechanics, Edema Reduction, Discharge Recommendations, LE HEP for Strengthening, Gait Training     Patient's Response to Education: Verbalized Understanding, Requires Further Education     Skilled Therapy Provided: Pt received resting in bed with family at bedside. Introduced self and role. Pt c/o significant pain of R knee at rest and worse with movement. Educated on benefits of frequent ambulation/mobility, safe surgical limb positioning, WBAT, and TKA exercises. Pt demos bed mobility with increased time and therapist supporting RLE d/t pain. Able to stand from chair with RW and cues given for safe hand placement and body mechanics. Assisted to don brief and pt able to complete pericares independently while standing with CGA. Ambulated in the room with RW and was left sitting in chair with needs within reach, handoff to RN complete.     The patient's Approx Degree of Impairment: 46.58% has been calculated based on documentation in the [facility] '6 clicks' Inpatient Basic Mobility Short Form.  Research supports that patients with this level of impairment may benefit from home with  PT.  Final disposition will be made by interdisciplinary medical team.    Patient End of Session: Up in chair, Needs met, RN aware of session/findings, Call light within reach, All patient questions and concerns addressed, Hospital anti-slip socks, Family present    CURRENT GOALS  Goals to be met by: 3/26/25  Patient Goal Patient's self-stated goal  is: go home   Goal #1 Patient is able to demonstrate supine - sit EOB @ level: supervision     Goal #1   Current Status    Goal #2 Patient is able to demonstrate transfers Sit to/from Stand at assistance level: supervision     Goal #2  Current Status    Goal #3 Patient is able to ambulate 150 feet with assistive device at assistance level: supervision   Goal #3   Current Status    Goal #4 Patient will negotiate 14 stairs/one curb w/ assistive device and supervision   Goal #4   Current Status    Goal #5  AROM 0 degrees extension to 95 degrees flexion     Goal #5   Current Status    Goal #6 Patient independently performs home exercise program for ROM/strengthening per the instructions provided in preparation for discharge.   Goal #6  Current Status      Patient Evaluation Complexity Level:  History Low - no personal factors and/or co-morbidities   Examination of body systems Low -  addressing 1-2 elements   Clinical Presentation Low- Stable   Clinical Decision Making  Low Complexity     Therapeutic Activity:  20 minutes

## 2025-03-19 NOTE — ANESTHESIA PROCEDURE NOTES
Spinal Block    Date/Time: 3/19/2025 7:39 AM    Performed by: Jhon Jimenez MD  Authorized by: Jhon Jimenez MD      General Information and Staff    Start Time:  3/19/2025 7:39 AM  End Time:  3/19/2025 7:48 AM  Anesthesiologist:  Jhon Jimenez MD  Performed by:  Anesthesiologist  Site identification: surface landmarks    Reason for Block: at surgeon's request and surgical anesthesia    Preanesthetic Checklist: patient identified, IV checked, risks and benefits discussed, monitors and equipment checked, pre-op evaluation, timeout performed, anesthesia consent and sterile technique used      Procedure Details    Patient Position:  Sitting  Prep: ChloraPrep    Monitoring:  Cardiac monitor  Approach:  Midline  Location:  L3-4  Injection Technique:  Single-shot    Needle    Needle Type:  Pencil-tip  Needle Gauge:  22 G  Needle Length:  6 in    Assessment    Sensory Level:  T6  Events: clear CSF, CSF aspirated, well tolerated and blood negative      Additional Comments
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
## 2025-03-19 NOTE — OPERATIVE REPORT
PREOPERATIVE DIAGNOSIS:  Right knee osteoarthritis.  POSTOPERATIVE DIAGNOSIS:  Right knee osteoarthritis.   PROCEDURE:  Robotic assisted right total knee arthroplasty.     Surgical Assistant.: Jeannine Echevarria PA-C, Melissa Liu SA         IMPLANTS:  Edilma Persona size 8 femur, size E tibia, 29 mm polyethylene patella button and  12 mm polyethylene insert.     INDICATIONS:  This is a pleasant 64-year-old-female  that sustained right knee osteoarthritis which failed nonoperative intervention.  The patient desired surgery in the form of a right total knee arthroplasty.  I had a long discussion with the patient regarding the risks, benefits, and alternatives of surgical and nonsurgical intervention.  The risks of surgery include, but were not limited to, infection, bleeding, neurovascular injury, pain and stiffness after surgery, need for surgery in the future, development of deep vein thrombosis, pulmonary emboli, and anesthetic compromise.  The patient agreed to proceed with surgical intervention and the above risks were discussed in detail.     OPERATIVE TECHNIQUE:   After the patient's informed consent was obtained, the patient's right knee was marked in the preoperative holding area. She then underwent an adductor canal block by the anesthesia service. She was brought back to the operating room and given spinal anesthetic. She was then sedated. The patient's right lower extremity was then prepped and draped in standard surgical fashion.  Perioperative antibiotics were infused.  Confirmation of identity and laterality took place.  Time-out was performed.     A well-padded tourniquet was placed on the patient's right proximal thigh prior to prepping and draping the lower extremity, it was insufflated to 300 mmHg.  A midline incision was made.  The incision was carried sharply through subcutaneous tissue to the fascia.  The fascia was incised.  A medial parapatellar arthrotomy was performed.  A medial  release was performed.  The ACL was sectioned, the suprapatellar pouch was excised. The robotic sensors were placed over the distal medial femur and proximal tibia. The patella was cut to accept a 29 mm button. The robotic arm was brought in and the distal femur, 4-in-one, and proximal tibia pins were placed. The knee was hyperflexed.  The distal femoral cutting guide was placed then cut.  The 4-in-1 cutting block was then used, anterior and posterior cuts were made followed by anterior and posterior chamfer cuts.  The tibia was subluxated forward.  The proximal tibia was cut in the appropriate position.  The trial size 8 femur was then placed, it was free floated with a size E tibia, followed with a 10 mm polyethylene insert trial. The patient had stability in varus and valgus stress testing and had full flexion, full extension. The proximal tibia was then subluxated forward and cut to accept the final implant.  At this time, the posterior medial and lateral capsule was injected with a pain cocktail in order to provide postoperative pain control. Cement was mixed. The final implants were then placed. The tibia was cemented first, followed by the femoral component. The patella was everted and cemented.  A 10 mm polyethylene insert was then placed into the knee. The knee was placed in full extension. The knee was then ranged and a 12 mm trial implant was found to have full extension, full flexion, and stability to varus/valgus stress testing.  Therefore, the trial implant was taken out and the final implant was placed in the knee. The knee was thoroughly irrigated and closed in layers.  A #1 Stratafix suture was used to close the medial parapatellar arthrotomy; 2-0 Stratafix suture was used to close the subcutaneous tissue.  3-0 Monocryl and Dermabond were used to close the skin.  A dressing was applied to the patient's right knee. A thigh-high PENNIE hose was applied.  The patient was awakened from anesthesia and  transferred to the recovery room in stable condition.     DISPOSITION:  The patient is weightbearing as tolerated on the right lower extremity.  She will use Oxy IR and Tylenol for pain control and take aspirin tomorrow for DVT prophylaxis.  She will be discharged today and do home physical therapy for 2 weeks.  She will then follow up in our office for her first postoperative visit in 2 weeks.  She will then begin outpatient physical therapy.

## 2025-03-19 NOTE — CM/SW NOTE
Department  notified of request for HHC, aidin referrals started. Assigned CM/SW to follow up with pt/family on further discharge planning.     Amaya Collier, DSC

## 2025-03-19 NOTE — BRIEF OP NOTE
Pre-Operative Diagnosis: Primary osteoarthritis of right knee     Post-Operative Diagnosis: Primary osteoarthritis of right knee      Procedure Performed:   Right total knee arthroplasty    Surgeons and Role:     * Yang Brennan MD - Primary    Assistant(s):  Surgical Assistant.: Melissa Liu  PA: Jeannine Echevarria PA-C     Surgical Findings: as above     Specimen: proximal tibia, distal femur, patella     Estimated Blood Loss: 10 cc        Yang Brennan MD  3/19/2025  9:25 AM

## 2025-03-19 NOTE — INTERVAL H&P NOTE
Pre-op Diagnosis: Primary osteoarthritis of right knee    The above referenced H&P was reviewed by Yang Brennan MD on 3/19/2025, the patient was examined and no significant changes have occurred in the patient's condition since the H&P was performed.  I discussed with the patient and/or legal representative the potential benefits, risks and side effects of this procedure; the likelihood of the patient achieving goals; and potential problems that might occur during recuperation.  I discussed reasonable alternatives to the procedure, including risks, benefits and side effects related to the alternatives and risks related to not receiving this procedure.  We will proceed with procedure as planned.

## 2025-03-19 NOTE — ANESTHESIA PROCEDURE NOTES
Peripheral Block    Date/Time: 3/19/2025 7:09 AM    Performed by: Jhon Jimenez MD  Authorized by: Jhon Jimenez MD      General Information and Staff    Start Time:  3/19/2025 7:09 AM  End Time:  3/19/2025 7:11 AM  Anesthesiologist:  Jhon Jimenez MD  Performed by:  Anesthesiologist  Patient Location:  PACU    Block Placement: Pre Induction  Site Identification: real time ultrasound guided and image stored and retrievable      Reason for Block: at surgeon's request and post-op pain management    Preanesthetic Checklist: 2 patient identifers, IV checked, risks and benefits discussed, monitors and equipment checked, pre-op evaluation, timeout performed, anesthesia consent and sterile technique used      Procedure Details    Patient Position:  Supine  Prep: ChloraPrep    Monitoring:  Cardiac monitor  Block Type:  Saphenous  Laterality:  Right  Injection Technique:  Single-shot    Needle    Needle Type:  Echogenic  Needle Gauge:  22 G  Needle Length:  110 mm  Needle Localization:  Anatomical landmarks and ultrasound guidance  Reason for Ultrasound Use: appropriate spread of the medication was noted in real time and no ultrasound evidence of intravascular and/or intraneural injection            Assessment    Injection Assessment:  Good spread noted, incremental injection, low pressure, local visualized surrounding nerve on ultrasound, negative aspiration for heme, no pain on injection and negative resistance  Paresthesia Pain:  None  Heart Rate Change: No        Medications  3/19/2025 7:09 AM  ropivacaine (NAROPIN) injection 0.5% - Infiltration   30 mL - 3/19/2025 7:09:00 AM    Additional Comments

## 2025-03-19 NOTE — CONSULTS
[facility]    PATIENT'S NAME: MARISSA NUNN   ATTENDING PHYSICIAN: Yang Brennan MD   CONSULTING PHYSICIAN: Soledad Yadav MD   PATIENT ACCOUNT#:   [id number]    LOCATION:  [address]  MEDICAL RECORD #:   [id number]       YOB: 1960  ADMISSION DATE:       2025      CONSULT DATE:  2025    REPORT OF CONSULTATION      REASON FOR ADMISSION:  Post right total knee arthroplasty.    HISTORY OF PRESENT ILLNESS:  The patient is a 64-year-old  female with chronic right knee pain and underlying severe primary osteoarthritis, failed outpatient conservative medical management options, scheduled today for above-mentioned procedure by her orthopedic surgeon, Dr. Brennan.  Preoperatively had spinal and saphenous nerve blocks.  Postoperatively transferred to PACU for further monitoring.     PAST MEDICAL HISTORY:  Generalized osteoarthritis, obesity, possible obstructive sleep apnea, hyperlipidemia, diabetes mellitus type 2, gastroesophageal reflux disease, rheumatoid arthritis, hypertension.    PAST SURGICAL HISTORY:  , right rotator cuff repair, cholecystectomy, tubal ligation, right knee arthroscopic procedure.    MEDICATIONS:  Please see medication reconciliation list.     ALLERGIES:  Penicillin.    SOCIAL HISTORY:  Ex-tobacco user.  Social alcohol.  No drug use.      FAMILY HISTORY:  Mother had diabetes mellitus type 2, cerebrovascular accident, and hypertension.  Father had coronary artery disease and hypertension.    REVIEW OF SYSTEMS:  Currently resting in bed.  Nauseated, but no right knee pain.  No chest pain.  No shortness of breath.  Other 12-point review of systems negative.      PHYSICAL EXAMINATION:    GENERAL:  Alert, oriented to time, place, and person, no acute distress.   VITAL SIGNS:  Temperature 97.4.  Pulse 77.  Respiratory rate 16.  Blood pressure 134/63.  Pulse ox 98% on room air.  HEENT:  Atraumatic.  Oropharynx clear.  Moist  mucous membranes.  Ears, nose normal.  Eyes:  Anicteric sclerae.  NECK:  Supple.  No lymphadenopathy.  Trachea midline.  Full range of motion.  LUNGS:  Clear to auscultation bilaterally.  Normal respiratory effort.  HEART:  Regular rate and rhythm.  S1 and S2 auscultated.  No murmur.  ABDOMEN:  Soft, nondistended.  No tenderness.  Positive bowel sounds.  EXTREMITIES:  No peripheral edema, clubbing, or cyanosis.  Right knee dressing.  NEUROLOGIC:  Decreased sensation and muscle movement both lower extremities post spinal block.    ASSESSMENT AND PLAN:    1.   Right knee primary osteoarthritis status post right total knee arthroplasty, spinal and saphenous nerve blocks.  Pain control.  Neurovascular checks.  Aspirin for DVT prophylaxis.  Physical and occupational therapy.  2.   Essential hypertension.  Continue home medications and monitor.  3.   Diabetes mellitus type 2.  Continue home medications.  Monitor Accu-Cheks.  Sliding scale insulin.  4.   Hyperlipidemia.  Continue home medications.  5.   Obstructive sleep apnea.  Apply obstructive sleep apnea protocol and monitor.    Dictated By Soledad Yadav MD  d: 03/19/2025 14:07:11  t: 03/19/2025 14:12:53  Job [id number]  FB/

## 2025-03-19 NOTE — DISCHARGE INSTRUCTIONS
Home Care Instructions Following Your Knee Replacement     We hope you were pleased with your care at [facility]. We wish you the best outcome and overall experience with your operation.   These instructions will help to minimize your pain and improve the likelihood of a successful result.     Weight bearing status  You may place as much weight as tolerated on your operative leg  Use a walker or a cane for assistance with ambulation    Bandages (Dressing)  Keep dressing clean  You can shower as long as your dressing is well sealed beginning 2 days after surgery    Wound Care  The following instructions will promote proper healing and help to prevent infection.  Leave your dressing in place until 7 days after your surgery  On post-operative day 7, you or your home health nurse can remove the bandage and replace it with another water resistant bandage.  Your incision should remain covered and dry until you are seen for your first post operative visit    Cold Therapy  Cold therapy will help minimize swelling, improve, comfort, and limit the need for pain medication.  Apply an ice pack once an hour for 20 minutes as needed  The ice bag should never come in direct contact with the skin. Always place a towel in between the ice pack and your skin  Immediately contact Dr. Brennan's office if you experience excruciating pain not helped by pain medication, burning, blisters, redness, discoloration, or other changes in skin appearance.    Pain Regimen  Take 1000mg of tylenol every 8 hours  Take 1 tablet of Celebrex 200mg twice daily for 30 days  Take 1-2 tablets of Oxycodone 5mg every 6 hours as needed for pain.     Deep Vein Thrombosis (DVT) Prophylaxis  You are at increased risk for developing a DVT or lower extremity blood clot after a lower extremity surgery.   Signs and symptoms of a DVT include calf swelling, calf pain, and calf tenderness to palpation.  If you experience severe calf pain, calf tenderness or  calf swelling, you should go to the nearest urgent care or emergency room immediately for a STAT ultrasound to assess for a blood clot.  Keys to prevention of  DVT are performing ankle pumps (moving your ankle in an up and down motion) multiple times throughout the day for the first 1-2 weeks post-op.  Take 1 tablet of baby aspirin (81mg) twice daily for 4 weeks after your surgery for anticoagulation purposes    Home Medication  Resume your home medications as instructed    Diet  Resume your normal diet.    Activity  Be up and around & working on your PT exercises as you can tolerate.   Try getting up and walking or doing your PT exercises once every hour while awake    Driving  Do not drive if you are taking pain medication. Do not drive until after you are seen in the office.    Follow-up Appointment with Dr. Brennan  You were likely given a postoperative appointment with Dr. Brennan or Jeannine RAWLS at the time your operation was scheduled.  Call Dr. Brennan's office today to verify your appointment date, time, and location. Call [phone]. You should be seen about 2 weeks after your surgery.  At your 1st postoperative office visit: Your wounds will be evaluated, staples removed, healing assessed and any additional concerns and instructions will be discussed.    Questions or Concerns  Call Dr. Brennan's office if you experience severe pain not controlled by pain medication, swelling, numbness, tingling, bleeding, fever, or other concerns.   If your call is made after office hours, there is always a surgeon covering Dr. Brennan's patients if he is unavailable.    Thank you for coming to [facility] for your operation. The nurses and the anesthesiologist try very hard to make sure you receive the best care possible. Your trust in them is greatly appreciated.    Thanks so much,  Dr. Brennan    [facility]  [facility]   [phone]

## 2025-03-20 VITALS
SYSTOLIC BLOOD PRESSURE: 127 MMHG | TEMPERATURE: 98 F | WEIGHT: 210 LBS | OXYGEN SATURATION: 99 % | BODY MASS INDEX: 38.64 KG/M2 | RESPIRATION RATE: 18 BRPM | HEIGHT: 62 IN | DIASTOLIC BLOOD PRESSURE: 70 MMHG | HEART RATE: 89 BPM

## 2025-03-20 PROBLEM — Z01.818 PREOP TESTING: Status: ACTIVE | Noted: 2019-04-25

## 2025-03-20 LAB
GLUCOSE BLDC GLUCOMTR-MCNC: 146 MG/DL (ref 70–99)
GLUCOSE BLDC GLUCOMTR-MCNC: 166 MG/DL (ref 70–99)
HCT VFR BLD AUTO: 31.6 %
HGB BLD-MCNC: 10.6 G/DL

## 2025-03-20 PROCEDURE — 99214 OFFICE O/P EST MOD 30 MIN: CPT | Performed by: HOSPITALIST

## 2025-03-20 RX ORDER — ONDANSETRON 8 MG/1
8 TABLET, ORALLY DISINTEGRATING ORAL EVERY 8 HOURS PRN
Qty: 15 TABLET | Refills: 0 | Status: SHIPPED | OUTPATIENT
Start: 2025-03-20

## 2025-03-20 NOTE — PHYSICAL THERAPY NOTE
PHYSICAL THERAPY TREATMENT NOTE - INPATIENT     Room Number: Room 5/Room 5-A       Presenting Problem: s/p R TKA on 3/19       Problem List  Principal Problem:    Primary osteoarthritis of right knee  Active Problems:    Essential hypertension    Diabetes mellitus type 2 without retinopathy (HCC)    Hypercholesteremia    Preop testing    GRETTA (obstructive sleep apnea)      PHYSICAL THERAPY ASSESSMENT   Patient demonstrates good  progress this session, goals  progressing with bed mobility, transfers, gait and stair training this session.       Patient is requiring stand-by assist and contact guard assist as a result of the following impairments: decreased functional strength, pain, and limited R knee ROM.     Patient continues to function below baseline with bed mobility, transfers, gait, and stair negotiation.  Pt demos adequate safety and stability with functional mobility tasks and is safe to discharge from PT standpoint. Physical Therapy will continue to follow patient for duration of hospitalization.    Patient continues to benefit from continued skilled PT services: at discharge to promote prior level of function and safety with additional support and return home with home health PT.    PLAN DURING HOSPITALIZATION  Nursing Mobility Recommendation : 1 Assist  PT Device Recommendation: Rolling walker  PT Treatment Plan: Bed mobility, Body mechanics, Endurance, Energy conservation, Patient education, Family education, Gait training, Strengthening, Stair training, Transfer training, Balance training  Frequency (Obs): BID     SUBJECTIVE  Agreeable to activity    OBJECTIVE  Precautions: None    WEIGHT BEARING RESTRICTION  R Lower Extremity: Weight Bearing as Tolerated      PAIN ASSESSMENT   Ratin  Location: R knee  Management Techniques: Activity promotion, Body mechanics    BALANCE  Static Sitting: Good  Dynamic Sitting: Fair +  Static Standing: Fair  Dynamic Standing: Fair -    ACTIVITY TOLERANCE  Pulse:  89  Heart Rate Source: Monitor     BP: 127/70  BP Location: Right arm  BP Method: Automatic  Patient Position: Sitting     O2 WALK  Oxygen Therapy  SPO2% on Room Air at Rest: 99    AM-PAC '6-Clicks' INPATIENT SHORT FORM - BASIC MOBILITY  How much difficulty does the patient currently have...  Patient Difficulty: Turning over in bed (including adjusting bedclothes, sheets and blankets)?: A Little   Patient Difficulty: Sitting down on and standing up from a chair with arms (e.g., wheelchair, bedside commode, etc.): A Little   Patient Difficulty: Moving from lying on back to sitting on the side of the bed?: A Little   How much help from another person does the patient currently need...   Help from Another: Moving to and from a bed to a chair (including a wheelchair)?: A Little   Help from Another: Need to walk in hospital room?: A Little   Help from Another: Climbing 3-5 steps with a railing?: A Little     AM-PAC Score:  Raw Score: 18   Approx Degree of Impairment: 46.58%   Standardized Score (AM-PAC Scale): 43.63   CMS Modifier (G-Code): CK    FUNCTIONAL ABILITY STATUS  Functional Mobility/Gait Assessment  Gait Assistance: Contact guard assist (>SBA)  Distance (ft): 120 ft  Assistive Device: Rolling walker  Pattern: Shuffle, R Decreased stance time (slow but steady paced)  Stairs: Stairs  How Many Stairs: 4  Device: 2 Rails  Assist: Contact guard assist  Pattern: Ascend and Descend  Ascend and Descend : Step to  Sit to Supine: contact guard assist for RLE  Sit to Stand: contact guard assist to RW    Skilled Therapy Provided: Pt rec'd in bed agreeable to therapy, identified by name and , gait belt donned for mobility. Session focused on progressing mobility in preparation for discharge. Pt ed provided on importance of OOB mobility once home, use of ice, use of RW, HEP therex, and car transfers, all with good pt understanding.         The patient's Approx Degree of Impairment: 46.58% has been calculated based on  documentation in the [facility] '6 clicks' Inpatient Daily Activity Short Form.  Research supports that patients with this level of impairment may benefit from HHPT.  Final disposition will be made by interdisciplinary medical team.    THERAPEUTIC EXERCISES  Lower Extremity Ankle pumps  Heel slides  LAQ  Quad sets     Position Sitting       Patient End of Session: In bed, Call light within reach, Needs met, RN aware of session/findings    CURRENT GOALS   Goals to be met by: 3/26/25  Patient Goal Patient's self-stated goal is: go home   Goal #1 Patient is able to demonstrate supine - sit EOB @ level: supervision      Goal #1   Current Status  in progress   Goal #2 Patient is able to demonstrate transfers Sit to/from Stand at assistance level: supervision      Goal #2  Current Status  in progress   Goal #3 Patient is able to ambulate 150 feet with assistive device at assistance level: supervision   Goal #3   Current Status  in progress   Goal #4 Patient will negotiate 14 stairs/one curb w/ assistive device and supervision   Goal #4   Current Status  in progress   Goal #5  AROM 0 degrees extension to 95 degrees flexion     Goal #5   Current Status  in progress   Goal #6 Patient independently performs home exercise program for ROM/strengthening per the instructions provided in preparation for discharge.   Goal #6  Current Status  ongoing      Gait Trainin minutes  Therapeutic Activity: 15 minutes

## 2025-03-20 NOTE — PROGRESS NOTES
[facility]  Progress Note    Pedro Vieyra Patient Status:  Outpatient in a Bed    1960 MRN [id number]   Location [facility] Attending Suhas Morgan MD   Hosp Day # 0 PCP Sam Rneteria MD     SUBJECTIVE:  INTERVAL HISTORY: POD 1 s/p R TKA  Patient is doing well. Pain 6/10 and controlled with PO pain meds. Patient denies chest pain, shortness of breath, fevers, and calf pain. Feeling much better than yesterday, has not had any nausea since early this morning.     OBJECTIVE:  Patient Vitals for the past 24 hrs:   BP Temp Temp src Pulse Resp SpO2   25 0757 128/67 98.1 °F (36.7 °C) Oral 81 18 99 %   25 0530 -- -- Oral 82 16 --   25 2340 -- -- Oral 79 16 --   25 2100 144/76 97.9 °F (36.6 °C) Oral 79 -- 96 %   25 1818 -- 97.1 °F (36.2 °C) Temporal -- 16 --   25 1219 134/63 -- -- 77 16 98 %   25 1209 127/74 97.9 °F (36.6 °C) Temporal 79 17 96 %   25 1159 134/73 -- -- 82 16 95 %   25 1149 124/68 -- -- 88 13 97 %       ORTHO EXAM:   Patient sitting comfortably in bed. PENNIE hose on bilaterally.  Right lower extremity:  Neurovascularly intact. Thigh and calf soft, non-tender. Negative Gale's sign. Aquacel dressing clean, dry, and intact.  No erythema present.       LABORATORY:  Recent Labs   Lab 25  0518   HGB 10.6*   HCT 31.6*      No results for input(s): \"PTP\", \"INR\" in the last 168 hours.      ASSESSMENT/PLAN:  POD 1 s/p R TKA  Continue pain management - tylenol, oxycodone, and celebrex sent to pharmacy. Will also send in for zofran to use prn  Continue DVT prophylaxis - ASA 81mg BID x 30 days  Continue PT/OT  Discharge planning: home today with home health PT and RN  Dressing change POD 7 to new Aquacel  Continue medical management  Follow up in office with Yang Brennan MD in 2 weeks, has appointment 3/31/2025 9am in [place] office      Jeannine Echevarria PA-C  3/20/2025  11:42 AM

## 2025-03-20 NOTE — DISCHARGE SUMMARY
[facility]ist Discharge Summary   Patient ID:  Pedro Vieyra  [id number]  64 year old  5/27/1960    Admit date: 3/19/2025  Discharge date: 3/20/2025  Primary Care Physician: Sam Renteria MD   Attending Physician: Suhas Morgan MD   Consults:   Consultants         Provider   Role Specialty     Soledad Yadav MD      Consulting Physician HOSPITALIST            Discharge Diagnoses:   Primary osteoarthritis of right knee    Reason for admission  Copied from admission H&P: please refer to preop H&P     Hospital Course:    R knee primary osteoarthritis   - s/p R total knee arthroplasty   - orthopedic surgery on consult.  - Pain controlled with oxycodone   - ASA for DVT prevention per orthopedic surgery   - DVT prevention meds and pain meds on DC per orthopedic surgery   - PT/OT - ok for home with [facility]     Acute blood loss anemia, postoperative expected, mild  - preop Hb 13 per EMR down to 10 today   - Monitor     Other medical problems  Essential HTN   DM II   Hyperlipidemia  GRETTA    EXAM:   GENERAL: no apparent distress, comfortable  NEURO: A/A Ox3, no focal deficits  RESP: non labored, CTAB/L  CARDIO: Regular, no murmur  ABD: soft, NT, ND  EXTREMITIES: no edema, no calf tenderness    Operative Procedures: Procedure(s) (LRB):  Right total knee arthroplasty (Right)    Discharge Instructions     Medication List        START taking these medications      acetaminophen 500 MG Tabs  Commonly known as: Tylenol Extra Strength  Take 2 tablets (1,000 mg total) by mouth every 8 (eight) hours.     aspirin 81 MG Tbec  Take 1 tablet (81 mg total) by mouth in the morning and 1 tablet (81 mg total) before bedtime.  Replaces: aspirin 81 MG Tabs     celecoxib 200 MG Caps  Commonly known as: CeleBREX  Take 1 capsule (200 mg total) by mouth 2 (two) times daily.     oxyCODONE 5 MG Tabs  Take 1 tablet (5 mg total) by mouth every 6 (six) hours as needed.            CHANGE how you take these medications       hydroCHLOROthiazide 12.5 MG Caps  Take 1 capsule (12.5 mg total) by mouth daily.  What changed: when to take this     losartan 50 MG Tabs  Commonly known as: Cozaar  Take 1 tablet (50 mg total) by mouth daily.  What changed: when to take this            CONTINUE taking these medications      allopurinol 100 MG Tabs  Commonly known as: Zyloprim  Take 1 tablet (100 mg total) by mouth daily.     Glucose Blood Strp  To test blood glucose bid - For One Touch Ultra Mini     Meloxicam 15 MG Tabs  Take 1 tablet (15 mg total) by mouth daily.     metFORMIN  MG Tb24  Commonly known as: Glucophage XR  Take 3 tablets (1,500 mg total) by mouth daily.     OneTouch Ultra 2 w/Device Kit  Check glucose once a day before breakfast     pantoprazole 40 MG Tbec  Commonly known as: Protonix  TAKE 1 TABLET BY MOUTH BEFORE BREAKFAST     rosuvastatin 20 MG Tabs  Commonly known as: Crestor  Take 1 tablet (20 mg total) by mouth nightly.     Trulicity 1.5 MG/0.5ML Soaj  Generic drug: Dulaglutide  Inject 1.5 mg into the skin once a week.            STOP taking these medications      aspirin 81 MG Tabs  Replaced by: aspirin 81 MG Tbec               Where to Get Your Medications        These medications were sent to [facility] [address] [place] [address] [phone], [phone]  [address] [place] [address]      Phone: [phone]   acetaminophen 500 MG Tabs  aspirin 81 MG Tbec  celecoxib 200 MG Caps  oxyCODONE 5 MG Tabs         Activity: activity as tolerated  Diet: regular diet  Wound Care: NA  Code Status: No Order        Discharge Instructions           Home Care Instructions Following Your Knee Replacement     We hope you were pleased with your care at [facility]. We wish you the best outcome and overall experience with your operation.   These instructions will help to minimize your pain and improve the likelihood of a successful result.     Weight bearing status  You may place as  much weight as tolerated on your operative leg  Use a walker or a cane for assistance with ambulation    Bandages (Dressing)  Keep dressing clean  You can shower as long as your dressing is well sealed beginning 2 days after surgery    Wound Care  The following instructions will promote proper healing and help to prevent infection.  Leave your dressing in place until 7 days after your surgery  On post-operative day 7, you or your home health nurse can remove the bandage and replace it with another water resistant bandage.  Your incision should remain covered and dry until you are seen for your first post operative visit    Cold Therapy  Cold therapy will help minimize swelling, improve, comfort, and limit the need for pain medication.  Apply an ice pack once an hour for 20 minutes as needed  The ice bag should never come in direct contact with the skin. Always place a towel in between the ice pack and your skin  Immediately contact Dr. Brennan's office if you experience excruciating pain not helped by pain medication, burning, blisters, redness, discoloration, or other changes in skin appearance.    Pain Regimen  Take 1000mg of tylenol every 8 hours  Take 1 tablet of Celebrex 200mg twice daily for 30 days  Take 1-2 tablets of Oxycodone 5mg every 6 hours as needed for pain.     Deep Vein Thrombosis (DVT) Prophylaxis  You are at increased risk for developing a DVT or lower extremity blood clot after a lower extremity surgery.   Signs and symptoms of a DVT include calf swelling, calf pain, and calf tenderness to palpation.  If you experience severe calf pain, calf tenderness or calf swelling, you should go to the nearest urgent care or emergency room immediately for a STAT ultrasound to assess for a blood clot.  Keys to prevention of  DVT are performing ankle pumps (moving your ankle in an up and down motion) multiple times throughout the day for the first 1-2 weeks post-op.  Take 1 tablet of baby aspirin (81mg) twice  daily for 4 weeks after your surgery for anticoagulation purposes    Home Medication  Resume your home medications as instructed    Diet  Resume your normal diet.    Activity  Be up and around & working on your PT exercises as you can tolerate.   Try getting up and walking or doing your PT exercises once every hour while awake    Driving  Do not drive if you are taking pain medication. Do not drive until after you are seen in the office.    Follow-up Appointment with Dr. Brennan  You were likely given a postoperative appointment with Dr. Brennan or Jeannine RAWLS at the time your operation was scheduled.  Call Dr. Brennan's office today to verify your appointment date, time, and location. Call [phone]. You should be seen about 2 weeks after your surgery.  At your 1st postoperative office visit: Your wounds will be evaluated, staples removed, healing assessed and any additional concerns and instructions will be discussed.    Questions or Concerns  Call Dr. Brennan's office if you experience severe pain not controlled by pain medication, swelling, numbness, tingling, bleeding, fever, or other concerns.   If your call is made after office hours, there is always a surgeon covering Dr. Brennan's patients if he is unavailable.    Thank you for coming to [facility] for your operation. The nurses and the anesthesiologist try very hard to make sure you receive the best care possible. Your trust in them is greatly appreciated.    Thanks so much,  Dr. Brennan          Important follow up:   Follow-up Information       Sam Renteria MD Follow up in 1 week(s).    Specialty: Internal Medicine  Contact information:  [address] [place] [address]  [phone]                             -PCP in [] within 7 days [] within 14 days [] other     Disposition: home  Discharged Condition: good    Hospital Discharge Diagnoses:  R knee oA    Lace+ Score: 27  59-90 High Risk  29-58 Medium Risk  0-28   Low  Risk.    TCM Follow-Up Recommendation:  LACE < 29: Low Risk of readmission after discharge from the hospital; Still recommend for TCM follow-up.            Total Time Coordinating Care: Greater than 30 minutes    Patient had opportunity to ask questions, state understanding, and agree with therapeutic plan as outlined    Suhas Morgan MD  Hospitalist  3/20/2025

## 2025-03-20 NOTE — OCCUPATIONAL THERAPY NOTE
OCCUPATIONAL THERAPY EVALUATION - INPATIENT     Room Number: Room 5/Room 5-A  Evaluation Date: 3/20/2025  Type of Evaluation: Initial  Presenting Problem: s/p R TKA    Physician Order: IP Consult to Occupational Therapy  Reason for Therapy: ADL/IADL Dysfunction and Discharge Planning    OCCUPATIONAL THERAPY ASSESSMENT   Patient is a 64 year old female admitted 3/19/2025 for R TKA.  Prior to admission, patient's baseline is Mod I with ADLs, functional mobility.  Patient is currently functioning near baseline with ADLs and functional mobility.  Patient is requiring SBA as a result of the following impairments: limited reach, pain. Occupational Therapy will continue to follow for duration of hospitalization.    Patient will benefit from continued skilled OT Services with no additional skilled services but increased support at home.    PLAN DURING HOSPITALIZATION  OT Device Recommendations: Raised toilet seat, TBD  OT Treatment Plan: Balance activities, Energy conservation/work simplification techniques, ADL training, Functional transfer training, Endurance training, Equipment eval/education, Compensatory technique education     OCCUPATIONAL THERAPY MEDICAL/SOCIAL HISTORY   Problem List   Principal Problem:    Primary osteoarthritis of right knee  Active Problems:    Essential hypertension    Diabetes mellitus type 2 without retinopathy (HCC)    Hypercholesteremia    Preop testing    GRETTA (obstructive sleep apnea)    HOME SITUATION  Type of Home: Apartment  Home Layout: One level  Lives With: Spouse (mother-in-law)  Toilet and Equipment: Standard height toilet  Drives: Yes  Patient Regularly Uses: None      SUBJECTIVE  \"I feel better now that I'm up\"     OCCUPATIONAL THERAPY EXAMINATION   OBJECTIVE  Precautions: None  Fall Risk: Standard fall risk    WEIGHT BEARING RESTRICTION  R Lower Extremity: Weight Bearing as Tolerated    PAIN ASSESSMENT  Ratin    ACTIVITY TOLERANCE  Room air    COGNITION  WFL    RANGE OF MOTION    Upper extremity ROM is within functional limits     STRENGTH ASSESSMENT  Upper extremity strength is within functional limits     ACTIVITIES OF DAILY LIVING ASSESSMENT  AM-PAC ‘6-Clicks’ Inpatient Daily Activity Short Form  How much help from another person does the patient currently need…  -   Putting on and taking off regular lower body clothing?: A Little  -   Bathing (including washing, rinsing, drying)?: A Little  -   Toileting, which includes using toilet, bedpan or urinal? : None  -   Putting on and taking off regular upper body clothing?: None  -   Taking care of personal grooming such as brushing teeth?: None  -   Eating meals?: None    AM-PAC Score:  Score: 22  Approx Degree of Impairment: 25.8%  Standardized Score (AM-PAC Scale): 47.1  CMS Modifier (G-Code): CJ    FUNCTIONAL TRANSFER ASSESSMENT  Sit to Stand: Edge of Bed; Chair  Edge of Bed: Stand-by Assist  Chair: Stand-by Assist  Toilet Transfer: Stand-by Assist    BED MOBILITY  Sit to Supine (OT): Minimal Assist    FUNCTIONAL ADL ASSESSMENT  Grooming Standing: Stand-by Assist  LB Dressing Standing: Stand-by Assist  Toileting Seated: Stand-by Assist    Skilled Therapy Provided: RN cleared pt for participation in occupational therapy session. Upon arrival, pt was seated in bedside chair and agreeable to activity. No family was present during session. Pt benefited from additional time, verbal cues, RW to maximize participation.  Pt was instructed on plan of care, bed mobility, transfers (hand and LE placement during transitional movements), and LE dressing strategies.  No loss of balance, good receptivity and carryover to education.  Pt completed activity at slowed but steady pace.     EDUCATION PROVIDED  Patient Education : Role of Occupational Therapy; Plan of Care; Functional Transfer Techniques  Patient's Response to Education: Verbalized Understanding; Returned Demonstration    The patient's Approx Degree of Impairment: 25.8% has been calculated  based on documentation in the [facility] '6 clicks' Inpatient Daily Activity Short Form.  Research supports that patients with this level of impairment may benefit from home.  Final disposition will be made by interdisciplinary medical team.    Patient End of Session: Needs met, Call light within reach, RN aware of session/findings, In bed    OT Goals  Patient self-stated goal is: home     Patient will complete LE dressing with Mod I  Comment:      Comment:      Comment:     Comment:         Goals  on: 3/27/25  Frequency:1-2 additional sessions      Patient Evaluation Complexity Level:   Occupational Profile/Medical History LOW - Brief history including review of medical or therapy records    Specific performance deficits impacting engagement in ADL/IADL LOW  1 - 3 performance deficits    Client Assessment/Performance Deficits LOW - No comorbidities nor modifications of tasks    Clinical Decision Making LOW - Analysis of occupational profile, problem-focused assessments, limited treatment options    Overall Complexity LOW     OT Session Time: 15 minutes  Self-Care Home Management: 15 minutes

## 2025-03-20 NOTE — CM/SW NOTE
LUCIA followed up on DC planning.     LUCIA uploaded C orders/F2F into aidin    SW notified [facility] on discharge    IP Transfer report uploaded as well    [facility]   [phone]    PLAN: home with [facility]    Samantha MENDIOLA, MSW ext. [phone]

## 2025-03-20 NOTE — PLAN OF CARE
Problem: Patient Centered Care  Goal: Patient preferences are identified and integrated in the patient's plan of care  Description: Interventions:- What would you like us to know as we care for you?   - Provide timely, complete, and accurate information to patient/family- Incorporate patient and family knowledge, values, beliefs, and cultural backgrounds into the planning and delivery of care- Encourage patient/family to participate in care and decision-making at the level they choose- Honor patient and family perspectives and choices  Outcome: Progressing     Problem: Diabetes/Glucose Control  Goal: Glucose maintained within prescribed range  Description: INTERVENTIONS:- Monitor Blood Glucose as ordered- Assess for signs and symptoms of hyperglycemia and hypoglycemia- Administer ordered medications to maintain glucose within target range- Assess barriers to adequate nutritional intake and initiate nutrition consult as needed- Instruct patient on self management of diabetes  Outcome: Progressing     Problem: Patient/Family Goals  Goal: Patient/Family Long Term Goal  Description: Patient's Long Term Goal: Interventions:- - See additional Care Plan goals for specific interventions  Outcome: Progressing  Goal: Patient/Family Short Term Goal  Description: Patient's Short Term Goal: Interventions: - - See additional Care Plan goals for specific interventions  Outcome: Progressing     Problem: SKIN/TISSUE INTEGRITY - ADULT  Goal: Incision(s), wounds(s) or drain site(s) healing without S/S of infection  Description: INTERVENTIONS:- Assess and document risk factors for pressure ulcer development- Assess and document skin integrity- Assess and document dressing/incision, wound bed, drain sites and surrounding tissue- Implement wound care per orders- Initiate isolation precautions as appropriate- Initiate Pressure Ulcer prevention bundle as indicated  Outcome: Progressing     Problem: PAIN - ADULT  Goal: Verbalizes/displays  adequate comfort level or patient's stated pain goal  Description: INTERVENTIONS:- Encourage pt to monitor pain and request assistance- Assess pain using appropriate pain scale- Administer analgesics based on type and severity of pain and evaluate response- Implement non-pharmacological measures as appropriate and evaluate response- Consider cultural and social influences on pain and pain management- Manage/alleviate anxiety- Utilize distraction and/or relaxation techniques- Monitor for opioid side effects- Notify MD/LIP if interventions unsuccessful or patient reports new pain- Anticipate increased pain with activity and pre-medicate as appropriate  Outcome: Progressing     Problem: SAFETY ADULT - FALL  Goal: Free from fall injury  Description: INTERVENTIONS:- Assess pt frequently for physical needs- Identify cognitive and physical deficits and behaviors that affect risk of falls.- [place] fall precautions as indicated by assessment.- Educate pt/family on patient safety including physical limitations- Instruct pt to call for assistance with activity based on assessment- Modify environment to reduce risk of injury- Provide assistive devices as appropriate- Consider OT/PT consult to assist with strengthening/mobility- Encourage toileting schedule  Outcome: Progressing     Problem: DISCHARGE PLANNING  Goal: Discharge to home or other facility with appropriate resources  Description: INTERVENTIONS:- Identify barriers to discharge w/pt and caregiver- Include patient/family/discharge partner in discharge planning- Arrange for needed discharge resources and transportation as appropriate- Identify discharge learning needs (meds, wound care, etc)- Arrange for interpreters to assist at discharge as needed- Consider post-discharge preferences of patient/family/discharge partner- Complete POLST form as appropriate- Assess patient's ability to be responsible for managing their own health- Refer to Case Management Department  for coordinating discharge planning if the patient needs post-hospital services based on physician/LIP order or complex needs related to functional status, cognitive ability or social support system  Outcome: Progressing    Maytral is aware of her plan of care. Patient is alert and oriented x4, room air. Pain controlled with PRN oxy. CMS intact. Denies any more nausea. Tolerating diet. Surgical dressing clean, dry and intact. Voids WNL. Up with one assist and walker. Safety measures in place, call light within reach, will continue to monitor.

## 2025-03-20 NOTE — CM/SW NOTE
Department  asked to send updates to [facility] referral for HHC.    Assigned SW/CM to follow up with patient/family on discharge plan.   Amaya Collier, DSC

## 2025-04-01 ENCOUNTER — OFFICE VISIT (OUTPATIENT)
Dept: PHYSICAL THERAPY | Age: 65
End: 2025-04-01
Attending: INTERNAL MEDICINE
Payer: COMMERCIAL

## 2025-04-01 ENCOUNTER — ORDER TRANSCRIPTION (OUTPATIENT)
Dept: PHYSICAL THERAPY | Facility: HOSPITAL | Age: 65
End: 2025-04-01

## 2025-04-01 DIAGNOSIS — Z96.651 PRESENCE OF RIGHT ARTIFICIAL KNEE JOINT: ICD-10-CM

## 2025-04-01 DIAGNOSIS — M17.11 OSTEOARTHRITIS OF RIGHT KNEE: Primary | ICD-10-CM

## 2025-04-01 PROCEDURE — 97161 PT EVAL LOW COMPLEX 20 MIN: CPT | Performed by: PHYSICAL THERAPIST

## 2025-04-01 PROCEDURE — 97110 THERAPEUTIC EXERCISES: CPT | Performed by: PHYSICAL THERAPIST
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
## 2025-04-03 ENCOUNTER — OFFICE VISIT (OUTPATIENT)
Dept: PHYSICAL THERAPY | Age: 65
End: 2025-04-03
Attending: INTERNAL MEDICINE
Payer: COMMERCIAL

## 2025-04-03 PROCEDURE — 97110 THERAPEUTIC EXERCISES: CPT | Performed by: PHYSICAL THERAPIST

## 2025-04-03 PROCEDURE — 97116 GAIT TRAINING THERAPY: CPT | Performed by: PHYSICAL THERAPIST

## 2025-04-03 PROCEDURE — 97140 MANUAL THERAPY 1/> REGIONS: CPT | Performed by: PHYSICAL THERAPIST

## 2025-04-03 NOTE — PROGRESS NOTES
Patient: Pedro Vieyra (64 year old, female) Referring Provider:  Insurance:   Diagnosis: R Total Knee arthroplasty No ref. provider found  [employer] [place]   Date of Surgery: 3/19/2025 Next MD visit:  N/A   Precautions:  None No data recorded Referral Information:    Date of Evaluation: Req: 0, Auth: 0, Exp:     04/01/25 POC Auth Visits:  12       Today's Date   4/3/2025    Subjective  Patient complains of R knee pain when stretching and at rest.       Pain: 7/10     Objective  R knee ROM 10 to 60. Rest of R LE WFL into all planes.  R knee flexion / extension strength 3/5, rest of R LE grossly 3+/5. L LE grossly 5/5.           Assessment  Demonstrates improved knee mobility. Still with pain that limits R LE mobility. Patient and PT go    Goals (to be met in 12 visits)    Not Met Progress Toward Partially Met Met   Patient will be independent with their home program, its progression, and management of residual symptoms. [] [x] [] []   Patient will report pain of not more than 1/10 during activity. [] [x] [] []   Patient will improve R knee ROM to WFL into all planes to improve LE mobility. [] [x] [] []   Increase strength to 5/5 throughout to be able to perform previous activities without difficulty. [] [x] [] []   Patient will verbalize and demonstrate strategies to improve posture and body mechanics during activity. [] [x] [] []   Patient will resume all walking activities including going up and down steps without use of assistive device and normal gait pattern. [] [x] [] []              Plan  Continue PT.    Treatment Last 4 Visits  Treatment Day: 2       4/1/2025 4/3/2025   LE Treatment   Therapeutic Exercise X25min  - R knee quad sets in supine 10 x 5 sec hold  - R knee flexion / extension AROM 10 x 2  - seated R LE ankle pump 20x  - R LE abduction, extension 20x  - R LE hamstring curls 10x  - calf stretch against wall  - knee flexion stretch on step  - instructed on HEP. Handouts were created and issued to patient  X25min  - seated R hip flexion 10 x 2  - seated R knee flexion stretch 10 x 2  - seated R LAQ's 10 x 2  - supine quad sets 10 x 10 sec hold x 2  - supine assisted SLR 10x  - manual PT  - supine R hip and knee flexion AAROM 10x  - R knee flexion stretch in sitting  - R knee flexion / extension AAROM in sitting  - repeat R knee LAQ's 10 x 2  - R knee flexion on 8 inch step  - Gait training  - Nustep L5 x 10min   Manual Therapy  X10min  - patellar mobilization R knee  - R knee mobilization into flexion / extension   Gait Training  X10min  - gait training with RW with cueing for heel / toe and knee flexion pattern  - gait training with unilateral support. Cueing provided to reinforce heel-toe pattern     Therapeutic Exercise Minutes 25 25   Neuro Re-Educ Minutes  10   Gait Training Minutes  10   Evaluation Minutes 20    Total Time Of Timed Procedures 25 45   Total Time Of Service-Based Procedures 20 0   Total Treatment Time 45 45        HEP       Charges     EX2, Neuro re-ed1, Gait training

## 2025-04-08 ENCOUNTER — OFFICE VISIT (OUTPATIENT)
Dept: PHYSICAL THERAPY | Age: 65
End: 2025-04-08
Attending: INTERNAL MEDICINE
Payer: COMMERCIAL

## 2025-04-08 PROCEDURE — 97116 GAIT TRAINING THERAPY: CPT | Performed by: PHYSICAL THERAPIST

## 2025-04-08 PROCEDURE — 97110 THERAPEUTIC EXERCISES: CPT | Performed by: PHYSICAL THERAPIST

## 2025-04-08 PROCEDURE — 97140 MANUAL THERAPY 1/> REGIONS: CPT | Performed by: PHYSICAL THERAPIST

## 2025-04-08 NOTE — PROGRESS NOTES
Patient: Pedro Vieyra (64 year old, female) Referring Provider:  Insurance:   Diagnosis: R Total Knee arthroplasty No ref. provider found  [employer] [place]   Date of Surgery: 3/19/2025 Next MD visit:  N/A   Precautions:  None No data recorded Referral Information:    Date of Evaluation: Req: 0, Auth: 0, Exp:     04/01/25 POC Auth Visits:  12       Today's Date   4/8/2025    Subjective  Patient states her knee is feeling better. Still reports       Pain: 5/10     Objective  R knee ROM 10 to 70. Rest of R LE WFL into all planes.  R knee flexion / extension strength 3/5, rest of R LE grossly 3+/5. L LE grossly 5/5.          Assessment  Demonstrates improved knee mobility. Still with pain but is able to tolerate ROM exercises better. Patient can now ambulate independently with a cane inddors. Patient and PT goals are in progress.    Goals (to be met in 12 visits)    Not Met Progress Toward Partially Met Met   Patient will be independent with their home program, its progression, and management of residual symptoms. [] [x] [] []   Patient will report pain of not more than 1/10 during activity. [] [x] [] []   Patient will improve R knee ROM to WFL into all planes to improve LE mobility. [] [x] [] []   Increase strength to 5/5 throughout to be able to perform previous activities without difficulty. [] [x] [] []   Patient will verbalize and demonstrate strategies to improve posture and body mechanics during activity. [] [x] [] []   Patient will resume all walking activities including going up and down steps without use of assistive device and normal gait pattern. [] [x] [] []                  Plan  Continue PT.    Treatment Last 4 Visits  Treatment Day: 3       4/1/2025 4/3/2025 4/8/2025   LE Treatment   Therapeutic Exercise X25min  - R knee quad sets in supine 10 x 5 sec hold  - R knee flexion / extension AROM 10 x 2  - seated R LE ankle pump 20x  - R LE abduction, extension 20x  - R LE hamstring curls 10x  - calf stretch  against wall  - knee flexion stretch on step  - instructed on HEP. Handouts were created and issued to patient X25min  - seated R hip flexion 10 x 2  - seated R knee flexion stretch 10 x 2  - seated R LAQ's 10 x 2  - supine quad sets 10 x 10 sec hold x 2  - supine assisted SLR 10x  - manual PT  - supine R hip and knee flexion AAROM 10x  - R knee flexion stretch in sitting  - R knee flexion / extension AAROM in sitting  - repeat R knee LAQ's 10 x 2  - R knee flexion on 8 inch step  - Gait training  - Nustep L5 x 10min X40  - supine lying with R knee in extension  - supine quad sets 10 x 10 sec hold   - supine R SLR 10 x 2  - manual PT  - seated R knee extensions 10 x 2  - R lower leg closed chain rotation 20x  - step up into 8 inch step 10 x 2  - R knee flexion stretching on 8 inch step 10 x 5 sec hold     Manual Therapy  X10min  - patellar mobilization R knee  - R knee mobilization into flexion / extension X10min  - R knee mobilization into flexion and extension in supine  - R knee patellar mobilization  - seated R knee mobilization into flexion   Gait Training  X10min  - gait training with RW with cueing for heel / toe and knee flexion pattern  - gait training with unilateral support. Cueing provided to reinforce heel-toe pattern   X10min  - Gait training with RW with cueing to improve knee extension / flexion  - Gait training with straight cane with cueing to improve knee extension / flexion   Therapeutic Exercise Minutes 25 25    Neuro Re-Educ Minutes  10    Gait Training Minutes  10    Evaluation Minutes 20     Total Time Of Timed Procedures 25 45 0   Total Time Of Service-Based Procedures 20 0 0   Total Treatment Time 45 45 0        HEP       Charges   EX2, Gait training, Manual PT1

## 2025-04-10 ENCOUNTER — OFFICE VISIT (OUTPATIENT)
Dept: PHYSICAL THERAPY | Age: 65
End: 2025-04-10
Attending: INTERNAL MEDICINE
Payer: COMMERCIAL

## 2025-04-10 PROCEDURE — 97110 THERAPEUTIC EXERCISES: CPT | Performed by: PHYSICAL THERAPIST

## 2025-04-10 PROCEDURE — 97140 MANUAL THERAPY 1/> REGIONS: CPT | Performed by: PHYSICAL THERAPIST

## 2025-04-10 NOTE — PROGRESS NOTES
Patient: Pedro Vierya (64 year old, female) Referring Provider:  Insurance:   Diagnosis: R Total Knee arthroplasty No ref. provider found  [employer] [place]   Date of Surgery: 3/19/2025 Next MD visit:  N/A   Precautions:  None No data recorded Referral Information:    Date of Evaluation: Req: 0, Auth: 0, Exp:     04/01/25 POC Auth Visits:  12       Today's Date   4/10/2025    Subjective  Patient states her knee was sore yesterday. Feeling better today.       Pain: 5/10     Objective  R knee ROM 10 to 70. Rest of R LE WFL into all planes.  R knee flexion / extension strength 3/5, rest of R LE grossly 3+/5. L LE grossly 5/5.          Assessment  Demonstrates improved knee mobility. Still with pain but is able to tolerate ROM exercises better. Patient can now ambulate independently with a cane inddors. Patient and PT goals are in progress.    Goals (to be met in 12 visits)      Not Met Progress Toward Partially Met Met   Patient will be independent with their home program, its progression, and management of residual symptoms. [] [x] [] []   Patient will report pain of not more than 1/10 during activity. [] [x] [] []   Patient will improve R knee ROM to WFL into all planes to improve LE mobility. [] [x] [] []   Increase strength to 5/5 throughout to be able to perform previous activities without difficulty. [] [x] [] []   Patient will verbalize and demonstrate strategies to improve posture and body mechanics during activity. [] [x] [] []   Patient will resume all walking activities including going up and down steps without use of assistive device and normal gait pattern. [] [x] [] []                      Plan  Continue PT.    Treatment Last 4 Visits  Treatment Day: 4       4/1/2025 4/3/2025 4/8/2025 4/10/2025   LE Treatment   Therapeutic Exercise X25min  - R knee quad sets in supine 10 x 5 sec hold  - R knee flexion / extension AROM 10 x 2  - seated R LE ankle pump 20x  - R LE abduction, extension 20x  - R LE hamstring  curls 10x  - calf stretch against wall  - knee flexion stretch on step  - instructed on HEP. Handouts were created and issued to patient X25min  - seated R hip flexion 10 x 2  - seated R knee flexion stretch 10 x 2  - seated R LAQ's 10 x 2  - supine quad sets 10 x 10 sec hold x 2  - supine assisted SLR 10x  - manual PT  - supine R hip and knee flexion AAROM 10x  - R knee flexion stretch in sitting  - R knee flexion / extension AAROM in sitting  - repeat R knee LAQ's 10 x 2  - R knee flexion on 8 inch step  - Gait training  - Nustep L5 x 10min X40  - supine lying with R knee in extension  - supine quad sets 10 x 10 sec hold   - supine R SLR 10 x 2  - manual PT  - seated R knee extensions 10 x 2  - R lower leg closed chain rotation 20x  - step up into 8 inch step 10 x 2  - R knee flexion stretching on 8 inch step 10 x 5 sec hold   X40min  - seated R knee extension 10 x 2  - supine quad set 10 x 10 sec hold  - supine SLR 10 x 10 sec hold  - manual PT  - blue theraband resisted closed chain quad sets 10 x 10 sec hold  - LE abduction, extension in standing 10 x 2  - knee flexion stretch on 8 inch step  - forward step up on 4 inch step 10 x 2  - seated hamstring curl with yellow theraband resistance 10 x 2  - Nustep L5 x 10min    Manual Therapy  X10min  - patellar mobilization R knee  - R knee mobilization into flexion / extension X10min  - R knee mobilization into flexion and extension in supine  - R knee patellar mobilization  - seated R knee mobilization into flexion X10min  - R knee mobilization into flexion and extension in supine  - R knee patellar mobilization  - seated R knee mobilization into flexion and extension     Gait Training  X10min  - gait training with RW with cueing for heel / toe and knee flexion pattern  - gait training with unilateral support. Cueing provided to reinforce heel-toe pattern   X10min  - Gait training with RW with cueing to improve knee extension / flexion  - Gait training with straight  cane with cueing to improve knee extension / flexion    Therapeutic Exercise Minutes 25 25  40   Neuro Re-Educ Minutes  10  10   Gait Training Minutes  10     Evaluation Minutes 20      Total Time Of Timed Procedures 25 45 0 50   Total Time Of Service-Based Procedures 20 0 0 0   Total Treatment Time 45 45 0 50        HEP       Charges     EX3, Neuro re-ed1

## 2025-04-15 ENCOUNTER — OFFICE VISIT (OUTPATIENT)
Dept: PHYSICAL THERAPY | Age: 65
End: 2025-04-15
Attending: INTERNAL MEDICINE
Payer: COMMERCIAL

## 2025-04-15 PROCEDURE — 97110 THERAPEUTIC EXERCISES: CPT | Performed by: PHYSICAL THERAPIST

## 2025-04-15 PROCEDURE — 97140 MANUAL THERAPY 1/> REGIONS: CPT | Performed by: PHYSICAL THERAPIST

## 2025-04-15 PROCEDURE — 97112 NEUROMUSCULAR REEDUCATION: CPT | Performed by: PHYSICAL THERAPIST

## 2025-04-15 NOTE — PROGRESS NOTES
Patient: Pedro Vieyra (64 year old, female) Referring Provider:  Insurance:   Diagnosis: R Total Knee arthroplasty No ref. provider found  [employer] [place]   Date of Surgery: 3/19/2025 Next MD visit:  N/A   Precautions:  None No data recorded Referral Information:    Date of Evaluation: Req: 0, Auth: 0, Exp:     04/01/25 POC Auth Visits:  12       Today's Date   4/15/2025    Subjective  States her knee is feeling better and is able to sleep better.       Pain: 4/10     Objective  R knee ROM 10 to 75. Rest of R LE WFL into all planes.  R knee flexion / extension strength 3/5, rest of R LE grossly 3+/5. L LE grossly 5/5.            Assessment  Demonstrates improving R knee mobility as pain has decreased. Patient and PT goals are in progress.    Goals (to be met in 12 visits)      Not Met Progress Toward Partially Met Met   Patient will be independent with their home program, its progression, and management of residual symptoms. [] [x] [] []   Patient will report pain of not more than 1/10 during activity. [] [x] [] []   Patient will improve R knee ROM to WFL into all planes to improve LE mobility. [] [x] [] []   Increase strength to 5/5 throughout to be able to perform previous activities without difficulty. [] [x] [] []   Patient will verbalize and demonstrate strategies to improve posture and body mechanics during activity. [] [x] [] []   Patient will resume all walking activities including going up and down steps without use of assistive device and normal gait pattern. [] [x] [] []                          Plan  Continue PT.    Treatment Last 4 Visits  Treatment Day: 5       4/3/2025 4/8/2025 4/10/2025 4/15/2025   LE Treatment   Therapeutic Exercise X25min  - seated R hip flexion 10 x 2  - seated R knee flexion stretch 10 x 2  - seated R LAQ's 10 x 2  - supine quad sets 10 x 10 sec hold x 2  - supine assisted SLR 10x  - manual PT  - supine R hip and knee flexion AAROM 10x  - R knee flexion stretch in sitting  - R knee  flexion / extension AAROM in sitting  - repeat R knee LAQ's 10 x 2  - R knee flexion on 8 inch step  - Gait training  - Nustep L5 x 10min X40  - supine lying with R knee in extension  - supine quad sets 10 x 10 sec hold   - supine R SLR 10 x 2  - manual PT  - seated R knee extensions 10 x 2  - R lower leg closed chain rotation 20x  - step up into 8 inch step 10 x 2  - R knee flexion stretching on 8 inch step 10 x 5 sec hold   X40min  - seated R knee extension 10 x 2  - supine quad set 10 x 10 sec hold  - supine SLR 10 x 10 sec hold  - manual PT  - blue theraband resisted closed chain quad sets 10 x 10 sec hold  - LE abduction, extension in standing 10 x 2  - knee flexion stretch on 8 inch step  - forward step up on 4 inch step 10 x 2  - seated hamstring curl with yellow theraband resistance 10 x 2  - Nustep L5 x 10min  X25min  - calf stretch on L3 slant board 30 sec x 3  - B LE heel raises x 20 on L2 slant board  - B LE tibia raises while leaning on wall 20x  - supine quad sets with R ankle dorsiflexion 10 x 10 sec hold  - prone R knee flexion AROM  - manual PT  - seated R LE extensions  - Shuttle B LE leg press 4 bands 10 x 3  - Shuttle single LE leg press 2 bands 10 x 2  - Nustep L5 x 10min   Neuro Re-Education    X10min to improve balance and LE control  - LE abduction, extension with yellow theraband resistance while standing on airex 10 x 3  - rocker board side to side, fwd / bkw x 2min   Manual Therapy X10min  - patellar mobilization R knee  - R knee mobilization into flexion / extension X10min  - R knee mobilization into flexion and extension in supine  - R knee patellar mobilization  - seated R knee mobilization into flexion X10min  - R knee mobilization into flexion and extension in supine  - R knee patellar mobilization  - seated R knee mobilization into flexion and extension   X10min  - R knee patellar mobilization   - R knee mobilization into flexion and extension in supine and prone   Gait Training  X10min  - gait training with RW with cueing for heel / toe and knee flexion pattern  - gait training with unilateral support. Cueing provided to reinforce heel-toe pattern   X10min  - Gait training with RW with cueing to improve knee extension / flexion  - Gait training with straight cane with cueing to improve knee extension / flexion     Therapeutic Exercise Minutes 25  40 25   Neuro Re-Educ Minutes 10  10 10   Manual Therapy Minutes    10   Gait Training Minutes 10      Total Time Of Timed Procedures 45 0 50 45   Total Time Of Service-Based Procedures 0 0 0 0   Total Treatment Time 45 0 50 45        HEP       Charges     EX2, Manual PT, Neuro re-ed1

## 2025-04-17 ENCOUNTER — OFFICE VISIT (OUTPATIENT)
Dept: PHYSICAL THERAPY | Age: 65
End: 2025-04-17
Attending: INTERNAL MEDICINE
Payer: COMMERCIAL

## 2025-04-17 PROCEDURE — 97110 THERAPEUTIC EXERCISES: CPT | Performed by: PHYSICAL THERAPIST

## 2025-04-17 PROCEDURE — 97112 NEUROMUSCULAR REEDUCATION: CPT | Performed by: PHYSICAL THERAPIST

## 2025-04-17 NOTE — PROGRESS NOTES
Patient: Pedro Vieyra (64 year old, female) Referring Provider:  Insurance:   Diagnosis: R Total Knee arthroplasty No ref. provider found  [employer] [place]   Date of Surgery: 3/19/2025 Next MD visit:  N/A   Precautions:  None No data recorded Referral Information:    Date of Evaluation: Req: 0, Auth: 0, Exp:     04/01/25 POC Auth Visits:  12       Today's Date   4/17/2025    Subjective  Reports R knee soreness but is feeling and moving better overall.       Pain: 4/10     Objective  R knee ROM 10 to 85. Rest of R LE WFL into all planes.  R knee flexion / extension strength 3/5, rest of R LE grossly 3+/5. L LE grossly 5/5.         Assessment  Demonstrates improving R knee mobility as pain has decreased. Patient is able to walk better with a cane. Patient and PT goals are in progress.    Goals (to be met in 12 visits)      Not Met Progress Toward Partially Met Met   Patient will be independent with their home program, its progression, and management of residual symptoms. [] [x] [] []   Patient will report pain of not more than 1/10 during activity. [] [x] [] []   Patient will improve R knee ROM to WFL into all planes to improve LE mobility. [] [x] [] []   Increase strength to 5/5 throughout to be able to perform previous activities without difficulty. [] [x] [] []   Patient will verbalize and demonstrate strategies to improve posture and body mechanics during activity. [] [x] [] []   Patient will resume all walking activities including going up and down steps without use of assistive device and normal gait pattern. [] [x] [] []                              Plan  Continue PT.    Treatment Last 4 Visits  Treatment Day: 6       4/8/2025 4/10/2025 4/15/2025 4/17/2025   LE Treatment   Therapeutic Exercise X40  - supine lying with R knee in extension  - supine quad sets 10 x 10 sec hold   - supine R SLR 10 x 2  - manual PT  - seated R knee extensions 10 x 2  - R lower leg closed chain rotation 20x  - step up into 8 inch step  10 x 2  - R knee flexion stretching on 8 inch step 10 x 5 sec hold   X40min  - seated R knee extension 10 x 2  - supine quad set 10 x 10 sec hold  - supine SLR 10 x 10 sec hold  - manual PT  - blue theraband resisted closed chain quad sets 10 x 10 sec hold  - LE abduction, extension in standing 10 x 2  - knee flexion stretch on 8 inch step  - forward step up on 4 inch step 10 x 2  - seated hamstring curl with yellow theraband resistance 10 x 2  - Nustep L5 x 10min  X25min  - calf stretch on L3 slant board 30 sec x 3  - B LE heel raises x 20 on L2 slant board  - B LE tibia raises while leaning on wall 20x  - supine quad sets with R ankle dorsiflexion 10 x 10 sec hold  - prone R knee flexion AROM  - manual PT  - seated R LE extensions  - Shuttle B LE leg press 4 bands 10 x 3  - Shuttle single LE leg press 2 bands 10 x 2  - Nustep L5 x 10min X30min  - supine quad sets with R ankle dorsiflexion 10 x 10 sec hold  - supine R SLR 10 x 2  - sidelying R SLR 10 x 2  - manual PT  - prone R knee flexion AROM  - calf stretch on L3 slant board 30 sec x 3  - B LE heel raises x 20 on L2 slant board  - B LE tibia raises while leaning on wall 20x  - Shuttle B LE leg press 5 bands 10 x 3  - Shuttle single LE leg press 3 bands 10 x 2  - squats 10 x 2  - split squats 10 x 2     Neuro Re-Education   X10min to improve balance and LE control  - LE abduction, extension with yellow theraband resistance while standing on airex 10 x 3  - rocker board side to side, fwd / bkw x 2min X10min to improve balance and LE control  - LE abduction, extension, hip and knee flexion with yellow theraband resistance while standing on airex 20x  - rocker board side to side, fwd / bkw x 2min     Manual Therapy X10min  - R knee mobilization into flexion and extension in supine  - R knee patellar mobilization  - seated R knee mobilization into flexion X10min  - R knee mobilization into flexion and extension in supine  - R knee patellar mobilization  - seated R  knee mobilization into flexion and extension   X10min  - R knee patellar mobilization   - R knee mobilization into flexion and extension in supine and prone X10min  - R knee patellar mobilization   - R knee mobilization into flexion and extension in supine and prone     Gait Training X10min  - Gait training with RW with cueing to improve knee extension / flexion  - Gait training with straight cane with cueing to improve knee extension / flexion      Therapeutic Exercise Minutes  40 25 30   Neuro Re-Educ Minutes  10 10 10   Manual Therapy Minutes   10 10   Total Time Of Timed Procedures 0 50 45 50   Total Time Of Service-Based Procedures 0 0 0 0   Total Treatment Time 0 50 45 50        HEP       Charges     EX3, Neuro re-ed1

## 2025-04-22 ENCOUNTER — OFFICE VISIT (OUTPATIENT)
Dept: PHYSICAL THERAPY | Age: 65
End: 2025-04-22
Attending: INTERNAL MEDICINE
Payer: COMMERCIAL

## 2025-04-22 PROCEDURE — 97110 THERAPEUTIC EXERCISES: CPT

## 2025-04-22 NOTE — PROGRESS NOTES
Patient: Pedro Vieyra (64 year old, female) Referring Provider:  Insurance:   Diagnosis: R Total Knee arthroplasty No ref. provider found  [employer] [place]   Date of Surgery: 3/19/2025 Next MD visit:  N/A   Precautions:  None No data recorded Referral Information:    Date of Evaluation: Req: 0, Auth: 0, Exp:     04/01/25 POC Auth Visits:  12       Today's Date   4/22/2025    Subjective  Patient reports her knee is doing better.       Pain: 3/10     Objective          4/22/2025:  Knee ROM (R):   Ext: - 5 deg  Flx: 85 deg       Assessment  Continued to focus on improving R knee ROM via standing TKE with band and knee flexion with SB.  Discussed with pt to avoid letting RLE rest into ER position.       Goals (to be met in 12 visits)   Goals: (to be met in 12 visits)     Not Met Progress Toward Partially Met Met   Patient will be independent with their home program, its progression, and management of residual symptoms. []  [x]  []  []    Patient will report pain of not more than 1/10 during activity. []  [x]  []  []    Patient will improve R knee ROM to WFL into all planes to improve LE mobility. []  [x]  []  []    Increase strength to 5/5 throughout to be able to perform previous activities without difficulty. []  [x]  []  []    Patient will verbalize and demonstrate strategies to improve posture and body mechanics during activity. []  [x]  []  []    Patient will resume all walking activities including going up and down steps without use of assistive device and normal gait pattern. []  [x]  []  []         Plan  continue PT to improve knee ROM, strength    Treatment Last 4 Visits  Treatment Day: 7       4/10/2025 4/15/2025 4/17/2025 4/22/2025   LE Treatment   Therapeutic Exercise X40min  - seated R knee extension 10 x 2  - supine quad set 10 x 10 sec hold  - supine SLR 10 x 10 sec hold  - manual PT  - blue theraband resisted closed chain quad sets 10 x 10 sec hold  - LE abduction, extension in standing 10 x 2  - knee  flexion stretch on 8 inch step  - forward step up on 4 inch step 10 x 2  - seated hamstring curl with yellow theraband resistance 10 x 2  - Nustep L5 x 10min  X25min  - calf stretch on L3 slant board 30 sec x 3  - B LE heel raises x 20 on L2 slant board  - B LE tibia raises while leaning on wall 20x  - supine quad sets with R ankle dorsiflexion 10 x 10 sec hold  - prone R knee flexion AROM  - manual PT  - seated R LE extensions  - Shuttle B LE leg press 4 bands 10 x 3  - Shuttle single LE leg press 2 bands 10 x 2  - Nustep L5 x 10min X30min  - supine quad sets with R ankle dorsiflexion 10 x 10 sec hold  - supine R SLR 10 x 2  - sidelying R SLR 10 x 2  - manual PT  - prone R knee flexion AROM  - calf stretch on L3 slant board 30 sec x 3  - B LE heel raises x 20 on L2 slant board  - B LE tibia raises while leaning on wall 20x  - Shuttle B LE leg press 5 bands 10 x 3  - Shuttle single LE leg press 3 bands 10 x 2  - squats 10 x 2  - split squats 10 x 2   - standing B gastroc stretch on slant board level 3 3 x 30 sec holds  - standing R knee flexion stretch at stair 10x 5 sec holds  - standing R TKE with black Tband 2 x 10 5 sec holds  - sidestepping along railing (no UE support) x 2 minutes  - shuttle machine B knee ext/flx 5 bands 3 x 10  - shuttle machine L knee ext/flx 3 2 x 10  - supine B knee flexion AAROM with SB 3 x 10  - long sitting L QS 10 sec holds 10x  - supine L SLR 2 x 10  - long sitting R heel slides with towel 10x 2 x 10  - supine R knee flexion with clinician assist 5x     Neuro Re-Education  X10min to improve balance and LE control  - LE abduction, extension with yellow theraband resistance while standing on airex 10 x 3  - rocker board side to side, fwd / bkw x 2min X10min to improve balance and LE control  - LE abduction, extension, hip and knee flexion with yellow theraband resistance while standing on airex 20x  - rocker board side to side, fwd / bkw x 2min      Manual Therapy X10min  - R knee  mobilization into flexion and extension in supine  - R knee patellar mobilization  - seated R knee mobilization into flexion and extension   X10min  - R knee patellar mobilization   - R knee mobilization into flexion and extension in supine and prone X10min  - R knee patellar mobilization   - R knee mobilization into flexion and extension in supine and prone   - STM R ITB   Therapeutic Exercise Minutes 40 25 30 43   Neuro Re-Educ Minutes 10 10 10    Manual Therapy Minutes  10 10 3   Total Time Of Timed Procedures 50 45 50 46   Total Time Of Service-Based Procedures 0 0 0 0   Total Treatment Time 50 45 50 46        HEP   Verbal issue: QS 10 sec holds, heel slide with towel assist    Charges      Ex 3

## 2025-04-24 ENCOUNTER — OFFICE VISIT (OUTPATIENT)
Dept: PHYSICAL THERAPY | Age: 65
End: 2025-04-24
Attending: INTERNAL MEDICINE
Payer: COMMERCIAL

## 2025-04-24 PROCEDURE — 97110 THERAPEUTIC EXERCISES: CPT

## 2025-04-24 NOTE — PROGRESS NOTES
Patient: Pedro Vieyra (64 year old, female) Referring Provider:  Insurance:   Diagnosis: R Total Knee arthroplasty No ref. provider found  [employer] [place]   Date of Surgery: 3/19/2025 Next MD visit:  N/A   Precautions:  None No data recorded Referral Information:    Date of Evaluation: Req: 0, Auth: 0, Exp:     04/01/25 POC Auth Visits:  12       Today's Date   4/24/2025    Subjective  Patient reports she has been utilizing a towel roll to keep her LE in neutral alignment.  Patient reports 5/10 knee pain today.       Pain: 5/10     Objective          See tx log below       Assessment  Initiated additional knee extension and flexion stretching in prone.  Initiated single leg knee extension on leg press today.        Goals (to be met in 12 visits)   Goals: (to be met in 12 visits)     Not Met Progress Toward Partially Met Met   Patient will be independent with their home program, its progression, and management of residual symptoms. []  [x]  []  []    Patient will report pain of not more than 1/10 during activity. []  [x]  []  []    Patient will improve R knee ROM to WFL into all planes to improve LE mobility. []  [x]  []  []    Increase strength to 5/5 throughout to be able to perform previous activities without difficulty. []  [x]  []  []    Patient will verbalize and demonstrate strategies to improve posture and body mechanics during activity. []  [x]  []  []    Patient will resume all walking activities including going up and down steps without use of assistive device and normal gait pattern. []  [x]  []  []             Plan  continue PT to improve knee ROM    Treatment Last 4 Visits  Treatment Day: 8       4/15/2025 4/17/2025 4/22/2025 4/24/2025   LE Treatment   Therapeutic Exercise X25min  - calf stretch on L3 slant board 30 sec x 3  - B LE heel raises x 20 on L2 slant board  - B LE tibia raises while leaning on wall 20x  - supine quad sets with R ankle dorsiflexion 10 x 10 sec hold  - prone R knee flexion AROM  -  manual PT  - seated R LE extensions  - Shuttle B LE leg press 4 bands 10 x 3  - Shuttle single LE leg press 2 bands 10 x 2  - Nustep L5 x 10min X30min  - supine quad sets with R ankle dorsiflexion 10 x 10 sec hold  - supine R SLR 10 x 2  - sidelying R SLR 10 x 2  - manual PT  - prone R knee flexion AROM  - calf stretch on L3 slant board 30 sec x 3  - B LE heel raises x 20 on L2 slant board  - B LE tibia raises while leaning on wall 20x  - Shuttle B LE leg press 5 bands 10 x 3  - Shuttle single LE leg press 3 bands 10 x 2  - squats 10 x 2  - split squats 10 x 2   - standing B gastroc stretch on slant board level 3 3 x 30 sec holds  - standing R knee flexion stretch at stair 10x 5 sec holds  - standing R TKE with black Tband 2 x 10 5 sec holds  - sidestepping along railing (no UE support) x 2 minutes  - shuttle machine B knee ext/flx 5 bands 3 x 10  - shuttle machine L knee ext/flx 3 2 x 10  - supine B knee flexion AAROM with SB 3 x 10  - long sitting L QS 10 sec holds 10x  - supine L SLR 2 x 10  - long sitting R heel slides with towel 10x 2 x 10  - supine R knee flexion with clinician assist 5x   - NuStep level 5 (UEs and LEs) x 6 minutes  - supine R/L hamstring stretch with strap 3 x 30 sec holds ea  - standing B gastroc stretch on slant board level 3 3 x 30 sec holds  - sidelying R hip abduction 2 x 10  - prone R knee bends 2 x 10  - prone R knee flexion with strap 10x 5 sec holds  - prone R TKE 2 x 10 5 sec holds  - supine B knee flexion AAROM with SB 3 x 10  - supine R SLR 3 x 10  - shuttle machine B knee ext/flx 3 bands 3 x 10  - shuttle machine R knee ext/flx 2 bands 2 x 10   Neuro Re-Education X10min to improve balance and LE control  - LE abduction, extension with yellow theraband resistance while standing on airex 10 x 3  - rocker board side to side, fwd / bkw x 2min X10min to improve balance and LE control  - LE abduction, extension, hip and knee flexion with yellow theraband resistance while standing on  airex 20x  - rocker board side to side, fwd / bkw x 2min       Manual Therapy X10min  - R knee patellar mobilization   - R knee mobilization into flexion and extension in supine and prone X10min  - R knee patellar mobilization   - R knee mobilization into flexion and extension in supine and prone   - STM R ITB    Therapeutic Exercise Minutes 25 30 43 43   Neuro Re-Educ Minutes 10 10     Manual Therapy Minutes 10 10 3    Total Time Of Timed Procedures 45 50 46 43   Total Time Of Service-Based Procedures 0 0 0 0   Total Treatment Time 45 50 46 43        HEP   Continue current HEP    Charges      Ex 3

## 2025-04-29 ENCOUNTER — OFFICE VISIT (OUTPATIENT)
Dept: PHYSICAL THERAPY | Age: 65
End: 2025-04-29
Attending: INTERNAL MEDICINE
Payer: COMMERCIAL

## 2025-04-29 PROCEDURE — 97110 THERAPEUTIC EXERCISES: CPT

## 2025-04-29 NOTE — PROGRESS NOTES
Patient: Pedro Vieyra (64 year old, female) Referring Provider:  Insurance:   Diagnosis: R Total Knee arthroplasty No ref. provider found  [employer] [place]   Date of Surgery: 3/19/2025 Next MD visit:  N/A   Precautions:  None No data recorded Referral Information:    Date of Evaluation: Req: 0, Auth: 0, Exp:     04/01/25 POC Auth Visits:  12       Today's Date   4/29/2025    Subjective  Patient reports she is having a gout flare up and so her leg is swollen.  Pt reports the flare up began this saturday.  She saw her surgeon who stated if her knee does not bend he may have to manipulate it.       Pain: 3/10     Objective       4/29/2025:  Knee ROM (R):  - 4 to 76 deg       Assessment  Session modified slightly due to gout flare up.  Patient displaying slight improved L knee extension ROM, but slight regression with flexion.        Goals (to be met in 12 visits)   Goals: (to be met in 12 visits)     Not Met Progress Toward Partially Met Met   Patient will be independent with their home program, its progression, and management of residual symptoms. []  [x]  []  []    Patient will report pain of not more than 1/10 during activity. []  [x]  []  []    Patient will improve R knee ROM to WFL into all planes to improve LE mobility. []  [x]  []  []    Increase strength to 5/5 throughout to be able to perform previous activities without difficulty. []  [x]  []  []    Patient will verbalize and demonstrate strategies to improve posture and body mechanics during activity. []  [x]  []  []    Patient will resume all walking activities including going up and down steps without use of assistive device and normal gait pattern. []  [x]  []  []                 Plan  continue PT to improve R knee ROM    Treatment Last 4 Visits  Treatment Day: 9       4/17/2025 4/22/2025 4/24/2025 4/29/2025   LE Treatment   Therapeutic Exercise X30min  - supine quad sets with R ankle dorsiflexion 10 x 10 sec hold  - supine R SLR 10 x 2  - sidelying R SLR 10  x 2  - manual PT  - prone R knee flexion AROM  - calf stretch on L3 slant board 30 sec x 3  - B LE heel raises x 20 on L2 slant board  - B LE tibia raises while leaning on wall 20x  - Shuttle B LE leg press 5 bands 10 x 3  - Shuttle single LE leg press 3 bands 10 x 2  - squats 10 x 2  - split squats 10 x 2   - standing B gastroc stretch on slant board level 3 3 x 30 sec holds  - standing R knee flexion stretch at stair 10x 5 sec holds  - standing R TKE with black Tband 2 x 10 5 sec holds  - sidestepping along railing (no UE support) x 2 minutes  - shuttle machine B knee ext/flx 5 bands 3 x 10  - shuttle machine L knee ext/flx 3 2 x 10  - supine B knee flexion AAROM with SB 3 x 10  - long sitting L QS 10 sec holds 10x  - supine L SLR 2 x 10  - long sitting R heel slides with towel 10x 2 x 10  - supine R knee flexion with clinician assist 5x   - NuStep level 5 (UEs and LEs) x 6 minutes  - supine R/L hamstring stretch with strap 3 x 30 sec holds ea  - standing B gastroc stretch on slant board level 3 3 x 30 sec holds  - sidelying R hip abduction 2 x 10  - prone R knee bends 2 x 10  - prone R knee flexion with strap 10x 5 sec holds  - prone R TKE 2 x 10 5 sec holds  - supine B knee flexion AAROM with SB 3 x 10  - supine R SLR 3 x 10  - shuttle machine B knee ext/flx 3 bands 3 x 10  - shuttle machine R knee ext/flx 2 bands 2 x 10 - Stationary Bike level 1 (1/2 retro revolutions) x 6 minutes  - seated R knee flexion towel slides 2 x 10  - seated R LAQs 2 x 10   - standing R knee flexion stretch on stair 2 x 10   - shuttle machine B knee ext/flx 3 bands 3 x 10  - shuttle machine R knee ext/flx 2 bands 2 x 10  - seated R knee flexion on 14 inch step 2 x 10 5 sec holds  - supine B knee flexion AAROM with SB 2 x 10  - supine R QS 15x 5 sec holds  - supine R heel slides 10x 5 sec holds   Neuro Re-Education X10min to improve balance and LE control  - LE abduction, extension, hip and knee flexion with yellow theraband resistance  while standing on airex 20x  - rocker board side to side, fwd / bkw x 2min        Manual Therapy X10min  - R knee patellar mobilization   - R knee mobilization into flexion and extension in supine and prone   - STM R ITB     Therapeutic Exercise Minutes 30 43 43 45   Neuro Re-Educ Minutes 10      Manual Therapy Minutes 10 3     Total Time Of Timed Procedures 50 46 43 45   Total Time Of Service-Based Procedures 0 0 0 0   Total Treatment Time 50 46 43 45        HEP   Continue current HEP    Charges          Ex 3

## 2025-05-01 ENCOUNTER — OFFICE VISIT (OUTPATIENT)
Dept: PHYSICAL THERAPY | Age: 65
End: 2025-05-01
Attending: INTERNAL MEDICINE
Payer: COMMERCIAL

## 2025-05-01 PROCEDURE — 97110 THERAPEUTIC EXERCISES: CPT

## 2025-05-01 NOTE — PROGRESS NOTES
Patient: Pedro Vieyra (64 year old, female) Referring Provider:  Insurance:   Diagnosis: R Total Knee arthroplasty No ref. provider found  [employer]   Date of Surgery: 3/19/2025 Next MD visit:  N/A   Precautions:  None No data recorded Referral Information:    Date of Evaluation: Req: 0, Auth: 0, Exp:     04/01/25 POC Auth Visits:  12       Today's Date   5/1/2025    Subjective  Patient reports her knee is doing okay, but it is just stiff.  Patient reports her leg swelling has reduced a lot.       Pain: 3/10     Objective       5/1/2025:   Knee ROM (R):  Flx: 82 deg     Assessment  Initiated knee flexion wall slides and heel slides with towel today.  Pt displaying reduced edema since initial gout flare up and since last session.        Goals (to be met in 12 visits)   Goals: (to be met in 12 visits)     Not Met Progress Toward Partially Met Met   Patient will be independent with their home program, its progression, and management of residual symptoms. []  [x]  []  []    Patient will report pain of not more than 1/10 during activity. []  [x]  []  []    Patient will improve R knee ROM to WFL into all planes to improve LE mobility. []  [x]  []  []    Increase strength to 5/5 throughout to be able to perform previous activities without difficulty. []  [x]  []  []    Patient will verbalize and demonstrate strategies to improve posture and body mechanics during activity. []  [x]  []  []    Patient will resume all walking activities including going up and down steps without use of assistive device and normal gait pattern. []  [x]  []  []                     Plan  continue PT to improve knee ROM    Treatment Last 4 Visits  Treatment Day: 10       4/22/2025 4/24/2025 4/29/2025 5/1/2025   LE Treatment   Therapeutic Exercise - standing B gastroc stretch on slant board level 3 3 x 30 sec holds  - standing R knee flexion stretch at stair 10x 5 sec holds  - standing R TKE with black Tband 2 x 10 5 sec holds  - sidestepping  along railing (no UE support) x 2 minutes  - shuttle machine B knee ext/flx 5 bands 3 x 10  - shuttle machine L knee ext/flx 3 2 x 10  - supine B knee flexion AAROM with SB 3 x 10  - long sitting L QS 10 sec holds 10x  - supine L SLR 2 x 10  - long sitting R heel slides with towel 10x 2 x 10  - supine R knee flexion with clinician assist 5x   - NuStep level 5 (UEs and LEs) x 6 minutes  - supine R/L hamstring stretch with strap 3 x 30 sec holds ea  - standing B gastroc stretch on slant board level 3 3 x 30 sec holds  - sidelying R hip abduction 2 x 10  - prone R knee bends 2 x 10  - prone R knee flexion with strap 10x 5 sec holds  - prone R TKE 2 x 10 5 sec holds  - supine B knee flexion AAROM with SB 3 x 10  - supine R SLR 3 x 10  - shuttle machine B knee ext/flx 3 bands 3 x 10  - shuttle machine R knee ext/flx 2 bands 2 x 10 - Stationary Bike level 1 (1/2 retro revolutions) x 6 minutes  - seated R knee flexion towel slides 2 x 10  - seated R LAQs 2 x 10   - standing R knee flexion stretch on stair 2 x 10   - shuttle machine B knee ext/flx 3 bands 3 x 10  - shuttle machine R knee ext/flx 2 bands 2 x 10  - seated R knee flexion on 14 inch step 2 x 10 5 sec holds  - supine B knee flexion AAROM with SB 2 x 10  - supine R QS 15x 5 sec holds  - supine R heel slides 10x 5 sec holds - NuStep level 4 (UEs and LEs) x 5 minutes  - stationary bike level 1 (retro 1/2 revolutions) x 3 minutes  - standing B gastroc stretch on slant board level 3 3 x 30 sec holds  - supine R knee flexion wall slides 2 x 10 5 sec holds  - supine B knee flexion AAROM with SB 3 x 10 5 sec holds  - supine R heel slides with towel 2 x 10  - seated R knee flexion with towel 2 x 10  - seated R knee flexion with RTB 2 x 10   Manual Therapy - STM R ITB      Therapeutic Exercise Minutes 43 43 45 45   Manual Therapy Minutes 3      Total Time Of Timed Procedures 46 43 45 45   Total Time Of Service-Based Procedures 0 0 0 0   Total Treatment Time 46 43 45 45    HEP    Updated HEP - see pt instructions section        HEP  Updated HEP - see pt instructions section    Charges        Ex 3

## 2025-05-06 ENCOUNTER — OFFICE VISIT (OUTPATIENT)
Dept: PHYSICAL THERAPY | Age: 65
End: 2025-05-06
Attending: INTERNAL MEDICINE
Payer: COMMERCIAL

## 2025-05-06 PROCEDURE — 97140 MANUAL THERAPY 1/> REGIONS: CPT

## 2025-05-06 PROCEDURE — 97110 THERAPEUTIC EXERCISES: CPT

## 2025-05-06 NOTE — PROGRESS NOTES
Patient: Pedro Vieyra (64 year old, female) Referring Provider:  Insurance:   Diagnosis: R Total Knee arthroplasty No ref. provider found  [employer] [place]   Date of Surgery: 3/19/2025 Next MD visit:  N/A   Precautions:  None No data recorded Referral Information:    Date of Evaluation: Req: 0, Auth: 0, Exp:     04/01/25 POC Auth Visits:  12       Today's Date   5/6/2025    Subjective  Patient reports her knee is feeling stiff.  She reports compliance with her new home exercises.       Pain: 4/10     Objective          5/6/2025:  Knee AAROM (R):  Flx: 84 deg       Assessment  Patient utilizing ankle/calf compression stocking to help gout symptoms, however, fluid/edema now localized to knee.  Instructed pt to utilize thigh/knee/ankle compression sleeve and provided pt with tubagrip sleeve option (size F).  Pt displaying improved R knee ROM despite knee edema.  Trial of STM to posterior knee musculature.       Goals (to be met in 12 visits)   Goals: (to be met in 12 visits)     Not Met Progress Toward Partially Met Met   Patient will be independent with their home program, its progression, and management of residual symptoms. []  [x]  []  []    Patient will report pain of not more than 1/10 during activity. []  [x]  []  []    Patient will improve R knee ROM to WFL into all planes to improve LE mobility. []  [x]  []  []    Increase strength to 5/5 throughout to be able to perform previous activities without difficulty. []  [x]  []  []    Patient will verbalize and demonstrate strategies to improve posture and body mechanics during activity. []  [x]  []  []    Patient will resume all walking activities including going up and down steps without use of assistive device and normal gait pattern. []  [x]  []  []                         Plan  continue PT to improve R knee ROM    Treatment Last 4 Visits  Treatment Day: 11 4/24/2025 4/29/2025 5/1/2025 5/6/2025   LE Treatment   Therapeutic Exercise - NuStep level 5 (UEs and  LEs) x 6 minutes  - supine R/L hamstring stretch with strap 3 x 30 sec holds ea  - standing B gastroc stretch on slant board level 3 3 x 30 sec holds  - sidelying R hip abduction 2 x 10  - prone R knee bends 2 x 10  - prone R knee flexion with strap 10x 5 sec holds  - prone R TKE 2 x 10 5 sec holds  - supine B knee flexion AAROM with SB 3 x 10  - supine R SLR 3 x 10  - shuttle machine B knee ext/flx 3 bands 3 x 10  - shuttle machine R knee ext/flx 2 bands 2 x 10 - Stationary Bike level 1 (1/2 retro revolutions) x 6 minutes  - seated R knee flexion towel slides 2 x 10  - seated R LAQs 2 x 10   - standing R knee flexion stretch on stair 2 x 10   - shuttle machine B knee ext/flx 3 bands 3 x 10  - shuttle machine R knee ext/flx 2 bands 2 x 10  - seated R knee flexion on 14 inch step 2 x 10 5 sec holds  - supine B knee flexion AAROM with SB 2 x 10  - supine R QS 15x 5 sec holds  - supine R heel slides 10x 5 sec holds - NuStep level 4 (UEs and LEs) x 5 minutes  - stationary bike level 1 (retro 1/2 revolutions) x 3 minutes  - standing B gastroc stretch on slant board level 3 3 x 30 sec holds  - supine R knee flexion wall slides 2 x 10 5 sec holds  - supine B knee flexion AAROM with SB 3 x 10 5 sec holds  - supine R heel slides with towel 2 x 10  - seated R knee flexion with towel 2 x 10  - seated R knee flexion with RTB 2 x 10 - NuStep level 5 (UEs and LEs) x 8 minutes  - supine R knee flexion wall slides 2 x 10 5 sec  - standing R knee flexion stretch on stair 10x 5 sec holds  - standing B gastroc stretch with slant board level 3 x 30 sec holds  - standing R TKE with black Tband 2 x 10 5 sec holds   Manual Therapy    - STM to hamstring, calf muscle   Therapeutic Exercise Minutes 43 45 45 36   Manual Therapy Minutes    8   Total Time Of Timed Procedures 43 45 45 44   Total Time Of Service-Based Procedures 0 0 0 0   Total Treatment Time 43 45 45 44   HEP   Updated HEP - see pt instructions section Size F tubagrip provided  for edema management (to cover ankle & knee)        HEP  Size F tubagrip provided for edema management (to cover ankle & knee)    Charges        Ex 2 Man 1

## 2025-05-08 ENCOUNTER — OFFICE VISIT (OUTPATIENT)
Dept: PHYSICAL THERAPY | Age: 65
End: 2025-05-08
Attending: INTERNAL MEDICINE
Payer: COMMERCIAL

## 2025-05-08 PROCEDURE — 97140 MANUAL THERAPY 1/> REGIONS: CPT

## 2025-05-08 PROCEDURE — 97110 THERAPEUTIC EXERCISES: CPT

## 2025-05-08 NOTE — PROGRESS NOTES
Patient: Pedro Vieyra (64 year old, female) Referring Provider:  Insurance:   Diagnosis: R Total Knee arthroplasty No ref. provider found  [employer]   Date of Surgery: 3/19/2025 Next MD visit:  N/A   Precautions:  None No data recorded Referral Information:    Date of Evaluation: Req: 0, Auth: 0, Exp:     04/01/25 POC Auth Visits:  12       Today's Date   5/8/2025    Subjective  Patient reports her knee is doing better.  She had some slight pain this morning, but after showering if felt better.   Pt reports she went shopping at [employer] and did some walking, but also had her  push her in a wheelchair so she wouldnt have to walk as much.       Pain: 0/10     Objective         5/8/2025:  Knee ROM (R): - 5 deg to 85 deg       Assessment  Patient with reduced knee edema noted as well as reduced pain.         Goals (to be met in 12 visits)   Goals: (to be met in 12 visits)     Not Met Progress Toward Partially Met Met   Patient will be independent with their home program, its progression, and management of residual symptoms. []  [x]  []  []    Patient will report pain of not more than 1/10 during activity. []  [x]  []  []    Patient will improve R knee ROM to WFL into all planes to improve LE mobility. []  [x]  []  []    Increase strength to 5/5 throughout to be able to perform previous activities without difficulty. []  [x]  []  []    Patient will verbalize and demonstrate strategies to improve posture and body mechanics during activity. []  [x]  []  []    Patient will resume all walking activities including going up and down steps without use of assistive device and normal gait pattern. []  [x]  []  []                             Plan  continue PT to improve R knee ROM    Treatment Last 4 Visits  Treatment Day: 12 4/29/2025 5/1/2025 5/6/2025 5/8/2025   LE Treatment   Therapeutic Exercise - Stationary Bike level 1 (1/2 retro revolutions) x 6 minutes  - seated R knee flexion towel slides 2 x 10  -  seated R LAQs 2 x 10   - standing R knee flexion stretch on stair 2 x 10   - shuttle machine B knee ext/flx 3 bands 3 x 10  - shuttle machine R knee ext/flx 2 bands 2 x 10  - seated R knee flexion on 14 inch step 2 x 10 5 sec holds  - supine B knee flexion AAROM with SB 2 x 10  - supine R QS 15x 5 sec holds  - supine R heel slides 10x 5 sec holds - NuStep level 4 (UEs and LEs) x 5 minutes  - stationary bike level 1 (retro 1/2 revolutions) x 3 minutes  - standing B gastroc stretch on slant board level 3 3 x 30 sec holds  - supine R knee flexion wall slides 2 x 10 5 sec holds  - supine B knee flexion AAROM with SB 3 x 10 5 sec holds  - supine R heel slides with towel 2 x 10  - seated R knee flexion with towel 2 x 10  - seated R knee flexion with RTB 2 x 10 - NuStep level 5 (UEs and LEs) x 8 minutes  - supine R knee flexion wall slides 2 x 10 5 sec  - standing R knee flexion stretch on stair 10x 5 sec holds  - standing B gastroc stretch with slant board level 3 x 30 sec holds  - standing R TKE with black Tband 2 x 10 5 sec holds - stationary bike seat 2 retro half revolutions x 5 minutes  - shuttle machine B knee ext/flx 3 bands 3 x 10  - shuttle machine R knee ext/flx 2 bands 10x  - standing B heel raises 2 x 10  - standing R/L hip abduction/hip extension with RTB at ankles 2 x 10 ea  - standing R hip flexion with RTB at ankles 2 x 10  - standing R knee flexion stretch on TM 10x 5 sec holds  - standing R forward step ups to 6 inch step 2 x 10  - supine R QS 10x 10 sec holds  - supine R SLR 2 x 10  - supine B knee flexion AAROM with SB 30x  - NuStep level 5 (UEs and LEs) x 10 minutes     Manual Therapy   - STM to hamstring, calf muscle - STM to hamstring, calf muscle   Therapeutic Exercise Minutes 45 45 36 55   Manual Therapy Minutes   8 8   Total Time Of Timed Procedures 45 45 44 63   Total Time Of Service-Based Procedures 0 0 0 0   Total Treatment Time 45 45 44 63   HEP  Updated HEP - see pt instructions section Size  F tubagrip provided for edema management (to cover ankle & knee)         HEP  Continue current HEP    Charges      Ex 4 Man 1

## 2025-05-13 ENCOUNTER — OFFICE VISIT (OUTPATIENT)
Dept: PHYSICAL THERAPY | Age: 65
End: 2025-05-13
Attending: PHYSICAL THERAPIST
Payer: COMMERCIAL

## 2025-05-13 PROCEDURE — 97110 THERAPEUTIC EXERCISES: CPT

## 2025-05-13 NOTE — PROGRESS NOTES
Patient: Pedro Vieyra (64 year old, female) Referring Provider:  Insurance:   Diagnosis: R Total Knee arthroplasty No ref. provider found  [employer]   Date of Surgery: 3/19/2025 Next MD visit:  N/A   Precautions:  None No data recorded Referral Information:    Date of Evaluation: Req: 0, Auth: 0, Exp:     04/01/25 POC Auth Visits:  12       Today's Date   5/13/2025    Subjective  Patient reports her knee has been stiff.       Pain: 0/10     Objective       5/13/2025:     Knee AROM (R):  Flx: 72 deg    Knee PROM (R):  Flx: 82 deg       Assessment   Continued to focus on aggressive knee flexion ROM stretching.          Goals: (to be met in 12 visits)     Not Met Progress Toward Partially Met Met   Patient will be independent with their home program, its progression, and management of residual symptoms. []  [x]  []  []    Patient will report pain of not more than 1/10 during activity. []  [x]  []  []    Patient will improve R knee ROM to WFL into all planes to improve LE mobility. []  [x]  []  []    Increase strength to 5/5 throughout to be able to perform previous activities without difficulty. []  [x]  []  []    Patient will verbalize and demonstrate strategies to improve posture and body mechanics during activity. []  [x]  []  []    Patient will resume all walking activities including going up and down steps without use of assistive device and normal gait pattern. []  [x]  []  []                                 Plan continue PT to improve knee ROM       Treatment Last 4 Visits  Treatment Day: 13 5/1/2025 5/6/2025 5/8/2025 5/13/2025   LE Treatment   Therapeutic Exercise - NuStep level 4 (UEs and LEs) x 5 minutes  - stationary bike level 1 (retro 1/2 revolutions) x 3 minutes  - standing B gastroc stretch on slant board level 3 3 x 30 sec holds  - supine R knee flexion wall slides 2 x 10 5 sec holds  - supine B knee flexion AAROM with SB 3 x 10 5 sec holds  - supine R heel slides with towel 2 x 10  - seated R  knee flexion with towel 2 x 10  - seated R knee flexion with RTB 2 x 10 - NuStep level 5 (UEs and LEs) x 8 minutes  - supine R knee flexion wall slides 2 x 10 5 sec  - standing R knee flexion stretch on stair 10x 5 sec holds  - standing B gastroc stretch with slant board level 3 x 30 sec holds  - standing R TKE with black Tband 2 x 10 5 sec holds - stationary bike seat 2 retro half revolutions x 5 minutes  - shuttle machine B knee ext/flx 3 bands 3 x 10  - shuttle machine R knee ext/flx 2 bands 10x  - standing B heel raises 2 x 10  - standing R/L hip abduction/hip extension with RTB at ankles 2 x 10 ea  - standing R hip flexion with RTB at ankles 2 x 10  - standing R knee flexion stretch on TM 10x 5 sec holds  - standing R forward step ups to 6 inch step 2 x 10  - supine R QS 10x 10 sec holds  - supine R SLR 2 x 10  - supine B knee flexion AAROM with SB 30x  - NuStep level 5 (UEs and LEs) x 10 minutes   - NuStep level 5 (UEs and LEs) x 8 minutes  - standing B gastroc stretch on slant board level 3 3 x 30 sec holds  - seated R knee flexion with GTB 2 x 10  - standing R knee flexion stretch on TM 10x 10 sec holds  - supine B knee flexion AAROM with SB 3 x 10  - prone R knee flexion stretch with strap 10 sec holds 2 x 10  - supine R knee flexion with strap 2 x 10  - long sitting R heel slides with towel 2 x 10  - supine L heel slides with clinician assist 5x  - supine L heel slides with towel 10x    Manual Therapy  - STM to hamstring, calf muscle - STM to hamstring, calf muscle    Therapeutic Exercise Minutes 45 36 55 45   Manual Therapy Minutes  8 8    Total Time Of Timed Procedures 45 44 63 45   Total Time Of Service-Based Procedures 0 0 0 0   Total Treatment Time 45 44 63 45   HEP Updated HEP - see pt instructions section Size F tubagrip provided for edema management (to cover ankle & knee)          HEP  Continue current HEP    Charges      Ex 3

## 2025-05-15 ENCOUNTER — OFFICE VISIT (OUTPATIENT)
Dept: PHYSICAL THERAPY | Age: 65
End: 2025-05-15
Attending: PHYSICAL THERAPIST
Payer: COMMERCIAL

## 2025-05-15 PROCEDURE — 97110 THERAPEUTIC EXERCISES: CPT

## 2025-05-15 NOTE — PROGRESS NOTES
Patient: Pedro Vieyra (64 year old, female) Referring Provider:  Insurance:   Diagnosis: R Total Knee arthroplasty No ref. provider found  [employer] [place]   Date of Surgery: 3/19/2025 Next MD visit:  N/A   Precautions:  None No data recorded Referral Information:    Date of Evaluation: Req: 0, Auth: 0, Exp:     04/01/25 POC Auth Visits:  12       Today's Date   5/15/2025    Subjective  Patient reports her knee is doing well.  She reports her lower leg is swollen.       Pain: 0/10     Objective       See tx log below       Assessment  Session focused on hip and knee strengthening.  Progressed hamstring strengthening to assist with promoting knee flexion.        Goals: (to be met in 12 visits)     Not Met Progress Toward Partially Met Met   Patient will be independent with their home program, its progression, and management of residual symptoms. []  [x]  []  []    Patient will report pain of not more than 1/10 during activity. []  [x]  []  []    Patient will improve R knee ROM to WFL into all planes to improve LE mobility. []  [x]  []  []    Increase strength to 5/5 throughout to be able to perform previous activities without difficulty. []  [x]  []  []    Patient will verbalize and demonstrate strategies to improve posture and body mechanics during activity. []  [x]  []  []    Patient will resume all walking activities including going up and down steps without use of assistive device and normal gait pattern. []  [x]  []  []                                 Plan continue PT to improve knee ROM       Treatment Last 4 Visits  Treatment Day: 14       5/6/2025 5/8/2025 5/13/2025 5/15/2025   LE Treatment   Therapeutic Exercise - NuStep level 5 (UEs and LEs) x 8 minutes  - supine R knee flexion wall slides 2 x 10 5 sec  - standing R knee flexion stretch on stair 10x 5 sec holds  - standing B gastroc stretch with slant board level 3 x 30 sec holds  - standing R TKE with black Tband 2 x 10 5 sec holds - stationary bike seat 2  retro half revolutions x 5 minutes  - shuttle machine B knee ext/flx 3 bands 3 x 10  - shuttle machine R knee ext/flx 2 bands 10x  - standing B heel raises 2 x 10  - standing R/L hip abduction/hip extension with RTB at ankles 2 x 10 ea  - standing R hip flexion with RTB at ankles 2 x 10  - standing R knee flexion stretch on TM 10x 5 sec holds  - standing R forward step ups to 6 inch step 2 x 10  - supine R QS 10x 10 sec holds  - supine R SLR 2 x 10  - supine B knee flexion AAROM with SB 30x  - NuStep level 5 (UEs and LEs) x 10 minutes   - NuStep level 5 (UEs and LEs) x 8 minutes  - standing B gastroc stretch on slant board level 3 3 x 30 sec holds  - seated R knee flexion with GTB 2 x 10  - standing R knee flexion stretch on TM 10x 10 sec holds  - supine B knee flexion AAROM with SB 3 x 10  - prone R knee flexion stretch with strap 10 sec holds 2 x 10  - supine R knee flexion with strap 2 x 10  - long sitting R heel slides with towel 2 x 10  - supine L heel slides with clinician assist 5x  - supine L heel slides with towel 10x  - NuStep level 6 (UEs and LEs) x 6 minutes  - standing B heel raises on slant board level 2 2 x 10  - standing B gastroc stretch on slant board level 3 3 x 30 sec holds  - shuttle machine B knee ext/flx 4 bands 3 x 10  - shuttle machine R/L knee ext/flx 3 bands 2 x 10  - standing R/L hip abduction/hip extension with GTB above knees 2 x 15 ea  - standing R hip flexion with GTB above knees 2 x 15  - standing R knee flexion on stair 2 x 10 5 sec holds  - standing forward step ups to 6 inch step 2 x 10  - seated R knee flexion with 0# 2 x 10   Neuro Re-Education    - sidestepping with GTB above knees x 1 minute  - R SLS 3 x 10 sec holds  - rocker board AP taps x 1 minute     Manual Therapy - STM to hamstring, calf muscle - STM to hamstring, calf muscle     Therapeutic Exercise Minutes 36 55 45 46   Neuro Re-Educ Minutes    4   Manual Therapy Minutes 8 8     Total Time Of Timed Procedures 44 63 45  50   Total Time Of Service-Based Procedures 0 0 0 0   Total Treatment Time 44 63 45 50   HEP Size F tubagrip provided for edema management (to cover ankle & knee)           HEP  Continue current HEP    Charges        Ex 3

## 2025-05-20 ENCOUNTER — OFFICE VISIT (OUTPATIENT)
Dept: PHYSICAL THERAPY | Age: 65
End: 2025-05-20
Attending: PHYSICAL THERAPIST
Payer: COMMERCIAL

## 2025-05-20 PROCEDURE — 97014 ELECTRIC STIMULATION THERAPY: CPT

## 2025-05-20 PROCEDURE — 97110 THERAPEUTIC EXERCISES: CPT

## 2025-05-20 RX ORDER — PANTOPRAZOLE SODIUM 40 MG/1
40 TABLET, DELAYED RELEASE ORAL
Qty: 90 TABLET | Refills: 0 | Status: SHIPPED | OUTPATIENT
Start: 2025-05-20

## 2025-05-20 NOTE — TELEPHONE ENCOUNTER
Requested Prescriptions     Pending Prescriptions Disp Refills    PANTOPRAZOLE 40 MG Oral Tab EC [Pharmacy Med Name: Pantoprazole Sodium 40 MG Oral Tablet Delayed Release] 90 tablet 0     Sig: TAKE 1 TABLET BY MOUTH BEFORE BREAKFAST     Last seen: 12/12/2023  Suggested follow up:  Last refill: 2/24/2025    Refill pended, please review/sign if agreeable.

## 2025-05-20 NOTE — PROGRESS NOTES
Patient: Pedro Vieyra (64 year old, female) Referring Provider:  Insurance:   Diagnosis: R Total Knee arthroplasty No ref. provider found  [employer]   Date of Surgery: 3/19/2025 Next MD visit:  N/A   Precautions:  None No data recorded Referral Information:    Date of Evaluation: Req: 0, Auth: 0, Exp:     04/01/25 POC Auth Visits:  12       Today's Date   5/20/2025    Subjective  Patient reports her knee is feeling warm today.  Pt reports she is having a hard time bending her knee.       Pain: 5/10     Objective          See tx log below      Assessment   Trial of estim/IFC for pain relief this session.  Instructed pt to re-utilize RW to reduce pain and inflammation.         Goals: (to be met in 12 visits)     Not Met Progress Toward Partially Met Met   Patient will be independent with their home program, its progression, and management of residual symptoms. []  [x]  []  []    Patient will report pain of not more than 1/10 during activity. []  [x]  []  []    Patient will improve R knee ROM to WFL into all planes to improve LE mobility. []  [x]  []  []    Increase strength to 5/5 throughout to be able to perform previous activities without difficulty. []  [x]  []  []    Patient will verbalize and demonstrate strategies to improve posture and body mechanics during activity. []  [x]  []  []    Patient will resume all walking activities including going up and down steps without use of assistive device and normal gait pattern. []  [x]  []  []                                 Plan  continue PT to improve knee ROM, RLE strength       Treatment Last 4 Visits  Treatment Day: 15       5/8/2025 5/13/2025 5/15/2025 5/20/2025   LE Treatment   Therapeutic Exercise - stationary bike seat 2 retro half revolutions x 5 minutes  - shuttle machine B knee ext/flx 3 bands 3 x 10  - shuttle machine R knee ext/flx 2 bands 10x  - standing B heel raises 2 x 10  - standing R/L hip abduction/hip extension with RTB at ankles 2 x 10 ea  -  standing R hip flexion with RTB at ankles 2 x 10  - standing R knee flexion stretch on TM 10x 5 sec holds  - standing R forward step ups to 6 inch step 2 x 10  - supine R QS 10x 10 sec holds  - supine R SLR 2 x 10  - supine B knee flexion AAROM with SB 30x  - NuStep level 5 (UEs and LEs) x 10 minutes   - NuStep level 5 (UEs and LEs) x 8 minutes  - standing B gastroc stretch on slant board level 3 3 x 30 sec holds  - seated R knee flexion with GTB 2 x 10  - standing R knee flexion stretch on TM 10x 10 sec holds  - supine B knee flexion AAROM with SB 3 x 10  - prone R knee flexion stretch with strap 10 sec holds 2 x 10  - supine R knee flexion with strap 2 x 10  - long sitting R heel slides with towel 2 x 10  - supine L heel slides with clinician assist 5x  - supine L heel slides with towel 10x  - NuStep level 6 (UEs and LEs) x 6 minutes  - standing B heel raises on slant board level 2 2 x 10  - standing B gastroc stretch on slant board level 3 3 x 30 sec holds  - shuttle machine B knee ext/flx 4 bands 3 x 10  - shuttle machine R/L knee ext/flx 3 bands 2 x 10  - standing R/L hip abduction/hip extension with GTB above knees 2 x 15 ea  - standing R hip flexion with GTB above knees 2 x 15  - standing R knee flexion on stair 2 x 10 5 sec holds  - standing forward step ups to 6 inch step 2 x 10  - seated R knee flexion with 0# 2 x 10 - NuStep level 5 (UEs and LEs) x 8 minutes  - supine R QS 10x 5 sec holds  - long sitting R QS 10x 5 sec holds  - supine B knee flexion AAROM with SB 30x  - shuttle machine B knee ext/flx 4 bands 2 x 15   - shuttle machine R knee ext/flx 2 bands 2 x 10   Neuro Re-Education   - sidestepping with GTB above knees x 1 minute  - R SLS 3 x 10 sec holds  - rocker board AP taps x 1 minute      Manual Therapy - STM to hamstring, calf muscle      Modalities    - estim/IFC with ice to right knee   Therapeutic Exercise Minutes 55 45 46 40   Neuro Re-Educ Minutes   4    Manual Therapy Minutes 8      E-Stim  (Unattended) Minutes    15   Total Time Of Timed Procedures 63 45 50 40   Total Time Of Service-Based Procedures 0 0 0 15   Total Treatment Time 63 45 50 55        HEP  Continue current HEP    Charges      Ex 3 Estim 1

## 2025-05-22 ENCOUNTER — OFFICE VISIT (OUTPATIENT)
Dept: PHYSICAL THERAPY | Age: 65
End: 2025-05-22
Attending: PHYSICAL THERAPIST
Payer: COMMERCIAL

## 2025-05-22 PROCEDURE — 97014 ELECTRIC STIMULATION THERAPY: CPT

## 2025-05-22 PROCEDURE — 97110 THERAPEUTIC EXERCISES: CPT

## 2025-05-22 NOTE — PROGRESS NOTES
Patient: Pedro Vieyra (64 year old, female) Referring Provider:  Insurance:   Diagnosis: R Total Knee arthroplasty No ref. provider found  [employer]   Date of Surgery: 3/19/2025 Next MD visit:  N/A   Precautions:  None No data recorded Referral Information:    Date of Evaluation: Req: 0, Auth: 0, Exp:     04/01/25 POC Auth Visits:  12       Today's Date   5/22/2025    Subjective  Patient reports her knee is doing better.  She states she rested her knee and utilized her RW which helped.  Pt states the estim machine helped last session.       Pain: 3/10     Objective       5/22/2025:  Significant reduced edema observed       Assessment  Continued with aggressive knee ROM stretching and utilized estim/IFC for pain relief and edema management.           Goals: (to be met in 12 visits)     Not Met Progress Toward Partially Met Met   Patient will be independent with their home program, its progression, and management of residual symptoms. []  [x]  []  []    Patient will report pain of not more than 1/10 during activity. []  [x]  []  []    Patient will improve R knee ROM to WFL into all planes to improve LE mobility. []  [x]  []  []    Increase strength to 5/5 throughout to be able to perform previous activities without difficulty. []  [x]  []  []    Patient will verbalize and demonstrate strategies to improve posture and body mechanics during activity. []  [x]  []  []    Patient will resume all walking activities including going up and down steps without use of assistive device and normal gait pattern. []  [x]  []  []                                 Plan  continue PT to improve knee ROM and strength    Treatment Last 4 Visits  Treatment Day: 16       5/13/2025 5/15/2025 5/20/2025 5/22/2025   LE Treatment   Therapeutic Exercise - NuStep level 5 (UEs and LEs) x 8 minutes  - standing B gastroc stretch on slant board level 3 3 x 30 sec holds  - seated R knee flexion with GTB 2 x 10  - standing R knee flexion stretch on TM  10x 10 sec holds  - supine B knee flexion AAROM with SB 3 x 10  - prone R knee flexion stretch with strap 10 sec holds 2 x 10  - supine R knee flexion with strap 2 x 10  - long sitting R heel slides with towel 2 x 10  - supine L heel slides with clinician assist 5x  - supine L heel slides with towel 10x  - NuStep level 6 (UEs and LEs) x 6 minutes  - standing B heel raises on slant board level 2 2 x 10  - standing B gastroc stretch on slant board level 3 3 x 30 sec holds  - shuttle machine B knee ext/flx 4 bands 3 x 10  - shuttle machine R/L knee ext/flx 3 bands 2 x 10  - standing R/L hip abduction/hip extension with GTB above knees 2 x 15 ea  - standing R hip flexion with GTB above knees 2 x 15  - standing R knee flexion on stair 2 x 10 5 sec holds  - standing forward step ups to 6 inch step 2 x 10  - seated R knee flexion with 0# 2 x 10 - NuStep level 5 (UEs and LEs) x 8 minutes  - supine R QS 10x 5 sec holds  - long sitting R QS 10x 5 sec holds  - supine B knee flexion AAROM with SB 30x  - shuttle machine B knee ext/flx 4 bands 2 x 15   - shuttle machine R knee ext/flx 2 bands 2 x 10 - NuStep level 5 (UEs and LEs) x 8 minutes  - prone R knee flexion with strap 10x 10 sec holds   - supine B knee flexion AAROM with GSB 3 x 10  - supine R knee flexion with clinician assist 10x  - supine R QS 2 x 10 5 sec holds     Neuro Re-Education  - sidestepping with GTB above knees x 1 minute  - R SLS 3 x 10 sec holds  - rocker board AP taps x 1 minute       Modalities   - estim/IFC with ice to right knee - estim/IFC with ice to knee in supine   Therapeutic Exercise Minutes 45 46 40 40   Neuro Re-Educ Minutes  4     E-Stim (Unattended) Minutes   15 15   Total Time Of Timed Procedures 45 50 40 40   Total Time Of Service-Based Procedures 0 0 15 15   Total Treatment Time 45 50 55 55        HEP  Continue current HEP    Charges     Ex 3 Estim 1

## 2025-05-27 ENCOUNTER — APPOINTMENT (OUTPATIENT)
Dept: PHYSICAL THERAPY | Age: 65
End: 2025-05-27
Attending: PHYSICAL THERAPIST
Payer: COMMERCIAL

## 2025-05-29 ENCOUNTER — OFFICE VISIT (OUTPATIENT)
Dept: PHYSICAL THERAPY | Age: 65
End: 2025-05-29
Attending: PHYSICAL THERAPIST
Payer: COMMERCIAL

## 2025-05-29 PROCEDURE — 97110 THERAPEUTIC EXERCISES: CPT

## 2025-05-29 PROCEDURE — 97140 MANUAL THERAPY 1/> REGIONS: CPT

## 2025-05-29 NOTE — PROGRESS NOTES
Patient: Pedro Vieyra (65 year old, female) Referring Provider:  Insurance:   Diagnosis: R Total Knee arthroplasty No ref. provider found  [employer]   Date of Surgery: 3/19/2025 Next MD visit:  N/A   Precautions:  None No data recorded Referral Information:    Date of Evaluation: Req: 0, Auth: 0, Exp:     04/01/25 POC Auth Visits:  12       Today's Date   5/29/2025    Subjective  Patient reports 5/10 lateral knee pain today.  Patient states she was out of town for her birthday for a few days and utilized a wheelchair for long distances.         Pain: 5/10     Objective          5/29/2025: redness observed in right lower leg    Knee ROM (R):   Ext: -4 deg  Flx: not tested today      Assessment  Patient demos improved R knee extension ROM with reassessment today.  Pt with observable redness in lower leg today and so instructed pt to go to immediate care after PT session - pt verbalized understanding.        Goals: (to be met in 12 visits)     Not Met Progress Toward Partially Met Met   Patient will be independent with their home program, its progression, and management of residual symptoms. []  [x]  []  []    Patient will report pain of not more than 1/10 during activity. []  [x]  []  []    Patient will improve R knee ROM to WFL into all planes to improve LE mobility. []  [x]  []  []    Increase strength to 5/5 throughout to be able to perform previous activities without difficulty. []  [x]  []  []    Patient will verbalize and demonstrate strategies to improve posture and body mechanics during activity. []  [x]  []  []    Patient will resume all walking activities including going up and down steps without use of assistive device and normal gait pattern. []  [x]  []  []                                 Plan  continue PT to improve knee ROM    Treatment Last 4 Visits  Treatment Day: 17       5/15/2025 5/20/2025 5/22/2025 5/29/2025   LE Treatment   Therapeutic Exercise - NuStep level 6 (UEs and LEs) x 6 minutes  -  standing B heel raises on slant board level 2 2 x 10  - standing B gastroc stretch on slant board level 3 3 x 30 sec holds  - shuttle machine B knee ext/flx 4 bands 3 x 10  - shuttle machine R/L knee ext/flx 3 bands 2 x 10  - standing R/L hip abduction/hip extension with GTB above knees 2 x 15 ea  - standing R hip flexion with GTB above knees 2 x 15  - standing R knee flexion on stair 2 x 10 5 sec holds  - standing forward step ups to 6 inch step 2 x 10  - seated R knee flexion with 0# 2 x 10 - NuStep level 5 (UEs and LEs) x 8 minutes  - supine R QS 10x 5 sec holds  - long sitting R QS 10x 5 sec holds  - supine B knee flexion AAROM with SB 30x  - shuttle machine B knee ext/flx 4 bands 2 x 15   - shuttle machine R knee ext/flx 2 bands 2 x 10 - NuStep level 5 (UEs and LEs) x 8 minutes  - prone R knee flexion with strap 10x 10 sec holds   - supine B knee flexion AAROM with GSB 3 x 10  - supine R knee flexion with clinician assist 10x  - supine R QS 2 x 10 5 sec holds   - NuStep level 5 (UEs and LEs) x 8 minutes  - R forward flexion stretch on stair 2 x 10 5 sec holds   - R TKE against black Tband 2 x 10 5 sec holds  - standing B gastroc stretch on slant board level 3 3 x 30 sec holds  - long sitting R QS 5 reps 5 sec holds   Neuro Re-Education - sidestepping with GTB above knees x 1 minute  - R SLS 3 x 10 sec holds  - rocker board AP taps x 1 minute        Manual Therapy    - STM R ITB/lateral knee, R distal quad, R hamstring muscle   Modalities  - estim/IFC with ice to right knee - estim/IFC with ice to knee in supine    Therapeutic Exercise Minutes 46 40 40 30   Neuro Re-Educ Minutes 4      Manual Therapy Minutes    10   E-Stim (Unattended) Minutes  15 15    Total Time Of Timed Procedures 50 40 40 40   Total Time Of Service-Based Procedures 0 15 15 0   Total Treatment Time 50 55 55 40        HEP  Continue current HEP    Charges        Ex 2 Man 1

## 2025-05-30 ENCOUNTER — APPOINTMENT (OUTPATIENT)
Dept: ULTRASOUND IMAGING | Age: 65
End: 2025-05-30
Attending: PHYSICIAN ASSISTANT
Payer: COMMERCIAL

## 2025-05-30 ENCOUNTER — HOSPITAL ENCOUNTER (OUTPATIENT)
Age: 65
Discharge: HOME | End: 2025-05-30
Attending: PHYSICIAN ASSISTANT
Payer: COMMERCIAL

## 2025-05-30 VITALS
SYSTOLIC BLOOD PRESSURE: 143 MMHG | HEART RATE: 90 BPM | RESPIRATION RATE: 18 BRPM | OXYGEN SATURATION: 96 % | TEMPERATURE: 98 F | DIASTOLIC BLOOD PRESSURE: 81 MMHG

## 2025-05-30 DIAGNOSIS — M10.9 ACUTE GOUT OF RIGHT ANKLE, UNSPECIFIED CAUSE: Primary | ICD-10-CM

## 2025-05-30 DIAGNOSIS — R60.0 LEG EDEMA, RIGHT: ICD-10-CM

## 2025-05-30 PROCEDURE — 99213 OFFICE O/P EST LOW 20 MIN: CPT | Performed by: PHYSICIAN ASSISTANT

## 2025-05-30 PROCEDURE — 93971 EXTREMITY STUDY: CPT | Performed by: PHYSICIAN ASSISTANT

## 2025-05-30 RX ORDER — HYDROCODONE BITARTRATE AND ACETAMINOPHEN 5; 325 MG/1; MG/1
1 TABLET ORAL EVERY 6 HOURS PRN
COMMUNITY
Start: 2025-03-31

## 2025-05-30 RX ORDER — PREDNISONE 20 MG/1
40 TABLET ORAL DAILY
Qty: 10 TABLET | Refills: 0 | Status: SHIPPED | OUTPATIENT
Start: 2025-05-30 | End: 2025-06-04

## 2025-05-30 NOTE — ED INITIAL ASSESSMENT (HPI)
Pt reports right leg pain/swelling and calf redness/warmth which began Tuesday. Doing physical therapy s/p knee replacement in March. Denies chest pain or SOB.
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
## 2025-06-05 ENCOUNTER — OFFICE VISIT (OUTPATIENT)
Dept: PHYSICAL THERAPY | Age: 65
End: 2025-06-05
Attending: INTERNAL MEDICINE
Payer: COMMERCIAL

## 2025-06-05 PROCEDURE — 97110 THERAPEUTIC EXERCISES: CPT

## 2025-06-05 NOTE — PROGRESS NOTES
Patient: Pedro Vieyra (65 year old, female) Referring Provider:  Insurance:   Diagnosis: R Total Knee arthroplasty No ref. provider found  [employer]   Date of Surgery: 3/19/2025 Next MD visit:  N/A   Precautions:  None No data recorded Referral Information:    Date of Evaluation: Req: 0, Auth: 0, Exp:     04/01/25 POC Auth Visits:  12       Today's Date   6/5/2025    Subjective  Patient reports the lower leg swelling she had last week was another gout flare up.  She was provided prednisone pills and the redness, swelling, and pain are now less.  She reports she has been compliant with her exercises every hour.  She states bought a small bike to use and a step. Pt states she will see her surgeon for follow up next week.        Pain: 3/10     Objective  6/5/2025:  knee ROM (R): -5 deg to 82 deg    Observation: no redness observed in right leg today       Assessment  Patient displaying improved gait pattern today with reduced limp and overall reduced RLE edema.         Goals: (to be met in 12 visits)     Not Met Progress Toward Partially Met Met   Patient will be independent with their home program, its progression, and management of residual symptoms. []  [x]  []  []    Patient will report pain of not more than 1/10 during activity. []  [x]  []  []    Patient will improve R knee ROM to WFL into all planes to improve LE mobility. []  [x]  []  []    Increase strength to 5/5 throughout to be able to perform previous activities without difficulty. []  [x]  []  []    Patient will verbalize and demonstrate strategies to improve posture and body mechanics during activity. []  [x]  []  []    Patient will resume all walking activities including going up and down steps without use of assistive device and normal gait pattern. []  [x]  []  []                                 Plan  continue PT to improve R knee ROM    Treatment Last 4 Visits  Treatment Day: 18 5/20/2025 5/22/2025 5/29/2025 6/5/2025   LE Treatment    Therapeutic Exercise - NuStep level 5 (UEs and LEs) x 8 minutes  - supine R QS 10x 5 sec holds  - long sitting R QS 10x 5 sec holds  - supine B knee flexion AAROM with SB 30x  - shuttle machine B knee ext/flx 4 bands 2 x 15   - shuttle machine R knee ext/flx 2 bands 2 x 10 - NuStep level 5 (UEs and LEs) x 8 minutes  - prone R knee flexion with strap 10x 10 sec holds   - supine B knee flexion AAROM with GSB 3 x 10  - supine R knee flexion with clinician assist 10x  - supine R QS 2 x 10 5 sec holds   - NuStep level 5 (UEs and LEs) x 8 minutes  - R forward flexion stretch on stair 2 x 10 5 sec holds   - R TKE against black Tband 2 x 10 5 sec holds  - standing B gastroc stretch on slant board level 3 3 x 30 sec holds  - long sitting R QS 5 reps 5 sec holds - NuStep level 5 x 9 minutes  - [employer] x 2 minutes  - standing B gastroc stretch on slant board level 3 3 x 30 sec holds  - supine R QS 20x 5 sec holds  - supine R SLR 10x  - supine R SLR with RTB above knees 10x  - prone R knee flexion 2 x 10  - prone R knee flexion with clinician assist 10x   - supine B knee AAROM with SB 3 x 10  - seated R knee flexion with RTB 2 x 10 5 sec holds  - seated R LAQs 3# 2 x 10 5 sec holds  - standing R knee flexion stretch at stair 10x 5 sec holds  - supine R knee flexion 10x     Manual Therapy   - STM R ITB/lateral knee, R distal quad, R hamstring muscle    Modalities - estim/IFC with ice to right knee - estim/IFC with ice to knee in supine     Therapeutic Exercise Minutes 40 40 30 54   Manual Therapy Minutes   10    E-Stim (Unattended) Minutes 15 15     Total Time Of Timed Procedures 40 40 40 54   Total Time Of Service-Based Procedures 15 15 0 0   Total Treatment Time 55 55 40 54        HEP  GTB provided for home use    Charges          Ex 4

## 2025-06-06 ENCOUNTER — OFFICE VISIT (OUTPATIENT)
Dept: PHYSICAL THERAPY | Age: 65
End: 2025-06-06
Attending: INTERNAL MEDICINE
Payer: COMMERCIAL

## 2025-06-06 PROCEDURE — 97110 THERAPEUTIC EXERCISES: CPT

## 2025-06-06 NOTE — PROGRESS NOTES
Patient: Pedro Vieyra (65 year old, female) Referring Provider:  Insurance:   Diagnosis: R Total Knee arthroplasty No ref. provider found  [employer] [place]   Date of Surgery: 3/19/2025 Next MD visit:  N/A   Precautions:  None No data recorded Referral Information:    Date of Evaluation: Req: 0, Auth: 0, Exp:     04/01/25 POC Auth Visits:  12       Today's Date   6/6/2025    Subjective  Patient reports no pain today.  She states she does have pain getting in and out of the car.       Pain: 0/10     Objective    6/5/2025:  Knee ROM (R): -5 deg to 82 deg      6/6/2025:   Knee ROM (R): -5 deg to 80 deg     Assessment  Initiated additional knee flexor strengthening to assist with improving knee flexion motion and strength.         Goals: (to be met in 12 visits)     Not Met Progress Toward Partially Met Met   Patient will be independent with their home program, its progression, and management of residual symptoms. []  [x]  []  []    Patient will report pain of not more than 1/10 during activity. []  [x]  []  []    Patient will improve R knee ROM to WFL into all planes to improve LE mobility. []  [x]  []  []    Increase strength to 5/5 throughout to be able to perform previous activities without difficulty. []  [x]  []  []    Patient will verbalize and demonstrate strategies to improve posture and body mechanics during activity. []  [x]  []  []    Patient will resume all walking activities including going up and down steps without use of assistive device and normal gait pattern. []  [x]  []  []                                 Plan  continue PT to improve knee ROM and strength    Treatment Last 4 Visits  Treatment Day: 19 5/22/2025 5/29/2025 6/5/2025 6/6/2025   LE Treatment   Therapeutic Exercise - NuStep level 5 (UEs and LEs) x 8 minutes  - prone R knee flexion with strap 10x 10 sec holds   - supine B knee flexion AAROM with GSB 3 x 10  - supine R knee flexion with clinician assist 10x  - supine R QS 2 x 10 5 sec  holds   - NuStep level 5 (UEs and LEs) x 8 minutes  - R forward flexion stretch on stair 2 x 10 5 sec holds   - R TKE against black Tband 2 x 10 5 sec holds  - standing B gastroc stretch on slant board level 3 3 x 30 sec holds  - long sitting R QS 5 reps 5 sec holds - NuStep level 5 x 9 minutes  - SciFit x 2 minutes  - standing B gastroc stretch on slant board level 3 3 x 30 sec holds  - supine R QS 20x 5 sec holds  - supine R SLR 10x  - supine R SLR with RTB above knees 10x  - prone R knee flexion 2 x 10  - prone R knee flexion with clinician assist 10x   - supine B knee AAROM with SB 3 x 10  - seated R knee flexion with RTB 2 x 10 5 sec holds  - seated R LAQs 3# 2 x 10 5 sec holds  - standing R knee flexion stretch at stair 10x 5 sec holds  - supine R knee flexion 10x   - NuStep level 5 (UEs and LEs) x 8 minutes  - standing B gastroc stretch on slant board level 4 3 x 30 sec holds  - shuttle machine B Knee ext/flx 5 bands 3 x 10  - shuttle machine R Knee ext/flx 4 bands 3 x 10  - standing B heel raises 2 x 10  - seated R knee flexion with 5# 3 x 10  - standing R/L hip abduction with GTB at ankles 3 x 10 ea  - standing marching x 1 minute x 2 reps  - standing R hip flexion with GTB at ankles 3 x 10  - prone R knee flexion with strap 10x 10 sec holds  - prone R knee flexion 3# 2 x 10     Manual Therapy  - STM R ITB/lateral knee, R distal quad, R hamstring muscle     Modalities - estim/IFC with ice to knee in supine      Therapeutic Exercise Minutes 40 30 54 45   Manual Therapy Minutes  10     E-Stim (Unattended) Minutes 15      Total Time Of Timed Procedures 40 40 54 45   Total Time Of Service-Based Procedures 15 0 0 0   Total Treatment Time 55 40 54 45        HEP  Continue current HEP    Charges      Ex 3

## 2025-06-11 ENCOUNTER — APPOINTMENT (OUTPATIENT)
Dept: PHYSICAL THERAPY | Age: 65
End: 2025-06-11
Attending: INTERNAL MEDICINE
Payer: COMMERCIAL

## 2025-06-16 NOTE — DISCHARGE INSTRUCTIONS
HOME INSTRUCTIONS  Weightbearing as tolerated on the right lower extremity.    Begin physical therapy tomorrow.    Follow up in 1 month for repeat evaluation.    Tramadol for pain control.    Call our office with any questions or concerns.    Aspirin for DVT prophylaxis as well as prednisone as an anti-inflammatory.   [facility] HOME CARE INSTRUCTIONS: POST-OP ANESTHESIA  The medication that you received for sedation or general anesthesia can last up to 24 hours. Your judgment and reflexes may be altered, even if you feel like your normal self.      We Recommend:   Do not drive any motor vehicle or bicycle   Avoid mowing the lawn, playing sports, or working with power tools/applicances (power saws, electric knives or mixers)   That you have someone stay with you on your first night home   Do not drink alcohol or take sleeping pills or tranquilizers   Do not sign legal documents within 24 hours of your procedure   If you had a nerve block for your surgery, take extra care not to put any pressure on your arm or hand for 24 hours    It is normal:  For you to have a sore throat if you had a breathing tube during surgery (while you were asleep!). The sore throat should get better within 48 hours. You can gargle with warm salt water (1/2 tsp in 4 oz warm water) or use a throat lozenge for comfort  To feel muscle aches or soreness especially in the abdomen, chest or neck. The achy feeling should go away in the next 24 hours  To feel weak, sleepy or \"wiped out\". Your should start feeling better in the next 24 hours.   To experience mild discomforts such as sore lip or tongue, headache, cramps, gas pains or a bloated feeling in your abdomen.   To experience mild back pain or soreness for a day or two if you had spinal or epidural anesthesia.   If you had laparoscopic surgery, to feel shoulder pain or discomfort on the day of surgery.   For some patients to have nausea after surgery/anesthesia    If you feel nausea or  experience vomiting:   Try to move around less.   Eat less than usual or drink only liquids until the next morning   Nausea should resolve in about 24 hours    If you have a problem when you are at home:    Call your surgeons office     Discharge Instructions: After Your Surgery  You’ve just had surgery. During surgery, you were given medicine called anesthesia to keep you relaxed and free of pain. After surgery, you may have some pain or nausea. This is common. Here are some tips for feeling better and getting well after surgery.   Going home  Your healthcare provider will show you how to take care of yourself when you go home. They'll also answer your questions. Have an adult family member or friend drive you home. For the first 24 hours after your surgery:   Don't drive or use heavy equipment.  Don't make important decisions or sign legal papers.  Take medicines as directed.  Don't drink alcohol.  Have someone stay with you, if needed. They can watch for problems and help keep you safe.  Be sure to go to all follow-up visits with your healthcare provider. And rest after your surgery for as long as your provider tells you to.   Coping with pain  If you have pain after surgery, pain medicine will help you feel better. Take it as directed, before pain becomes severe. Also, ask your healthcare provider or pharmacist about other ways to control pain. This might be with heat, ice, or relaxation. And follow any other instructions your surgeon or nurse gives you.      Stay on schedule with your medicine.     Tips for taking pain medicine  To get the best relief possible, remember these points:   Pain medicines can upset your stomach. Taking them with a little food may help.  Most pain relievers taken by mouth need at least 20 to 30 minutes to start to work.  Don't wait till your pain becomes severe before you take your medicine. Try to time your medicine so that you can take it before starting an activity. This might be  before you get dressed, go for a walk, or sit down for dinner.  Constipation is a common side effect of some pain medicines. Call your healthcare provider before taking any medicines, such as laxatives or stool softeners, to help ease constipation. Also ask if you should skip any foods. Drinking lots of fluids and eating foods, such as fruits and vegetables, that are high in fiber can also help. Remember, don't take laxatives unless your surgeon has prescribed them.  Drinking alcohol and taking pain medicine can cause dizziness and slow your breathing. It can even be deadly. Don't drink alcohol while taking pain medicine.  Pain medicine can make you react more slowly to things. Don't drive or run machinery while taking pain medicine.  Your healthcare provider may tell you to take acetaminophen to help ease your pain. Ask them how much you're supposed to take each day. Acetaminophen or other pain relievers may interact with your prescription medicines or other over-the-counter (OTC) medicines. Some prescription medicines have acetaminophen and other ingredients in them. Using both prescription and OTC acetaminophen for pain can cause you to accidentally overdose. Read the labels on your OTC medicines with care. This will help you to clearly know the list of ingredients, how much to take, and any warnings. It may also help you not take too much acetaminophen. If you have questions or don't understand the information, ask your pharmacist or healthcare provider to explain it to you before you take the OTC medicine.   Managing nausea  Some people have an upset stomach (nausea) after surgery. This is often because of anesthesia, pain, or pain medicine, less movement of food in the stomach, or the stress of surgery. These tips will help you handle nausea and eat healthy foods as you get better. If you were on a special food plan before surgery, ask your healthcare provider if you should follow it while you get better.  Check with your provider on how your eating should progress. It may depend on the surgery you had. These general tips may help:   Don't push yourself to eat. Your body will tell you when to eat and how much.  Start off with clear liquids and soup. They're easier to digest.  Next try semi-solid foods as you feel ready. These include mashed potatoes, applesauce, and gelatin.  Slowly move to solid foods. Don’t eat fatty, rich, or spicy foods at first.  Don't force yourself to have 3 large meals a day. Instead eat smaller amounts more often.  Take pain medicines with a small amount of solid food, such as crackers or toast. This helps prevent nausea.  When to call your healthcare provider  Call your healthcare provider right away if you have any of these:   You still have too much pain, or the pain gets worse, after taking the medicine. The medicine may not be strong enough. Or there may be a complication from the surgery.  You feel too sleepy, dizzy, or groggy. The medicine may be too strong.  Side effects, such as nausea or vomiting. Your healthcare provider may advise taking other medicines to treat these or may change your treatment plan..  Skin changes, such as rash, itching, or hives. This may mean you have an allergic reaction. Your provider may advise taking other medicines.  The incision looks different (for instance, part of it opens up).  Bleeding or fluid leaking from the incision site, and you weren't told to expect that.  Fever of 100.4°F (38°C) or higher, or as directed by your healthcare provider.  Call 911  Call 911 right away if you have:   Trouble breathing  Facial swelling    If you have obstructive sleep apnea   You were given anesthesia medicine during surgery to keep you comfortable and free of pain. After surgery, you may have more apnea spells because of this medicine and other medicines you were given. The spells may last longer than normal.    At home:  Keep using the continuous positive airway  pressure (CPAP) device when you sleep. Unless your healthcare provider tells you not to, use it when you sleep, day or night. CPAP is a common device used to treat obstructive sleep apnea.  Talk with your provider before taking any pain medicine, muscle relaxants, or sedatives. Your provider will tell you about the possible dangers of taking these medicines.  Contact your provider if your sleeping changes a lot even when taking medicines as directed.  [employer] last reviewed this educational content on 4/1/2024  This information is for informational purposes only. This is not intended to be a substitute for professional medical advice, diagnosis, or treatment. Always seek the advice and follow the directions from your physician or other qualified health care provider.  © 1831-2451 [employer]. All rights reserved. This information is not intended as a substitute for professional medical care. Always follow your healthcare professional's instructions.

## 2025-06-17 ENCOUNTER — APPOINTMENT (OUTPATIENT)
Dept: PHYSICAL THERAPY | Age: 65
End: 2025-06-17
Attending: INTERNAL MEDICINE
Payer: COMMERCIAL

## 2025-06-18 ENCOUNTER — APPOINTMENT (OUTPATIENT)
Dept: GENERAL RADIOLOGY | Facility: HOSPITAL | Age: 65
End: 2025-06-18
Attending: ORTHOPAEDIC SURGERY
Payer: COMMERCIAL

## 2025-06-18 ENCOUNTER — HOSPITAL ENCOUNTER (OUTPATIENT)
Facility: HOSPITAL | Age: 65
Setting detail: HOSPITAL OUTPATIENT SURGERY
Discharge: HOME | End: 2025-06-18
Attending: ORTHOPAEDIC SURGERY | Admitting: ORTHOPAEDIC SURGERY
Payer: COMMERCIAL

## 2025-06-18 ENCOUNTER — ANESTHESIA EVENT (OUTPATIENT)
Dept: SURGERY | Facility: HOSPITAL | Age: 65
End: 2025-06-18
Payer: COMMERCIAL

## 2025-06-18 ENCOUNTER — ANESTHESIA (OUTPATIENT)
Dept: SURGERY | Facility: HOSPITAL | Age: 65
End: 2025-06-18
Payer: COMMERCIAL

## 2025-06-18 VITALS
RESPIRATION RATE: 17 BRPM | HEART RATE: 80 BPM | WEIGHT: 210 LBS | TEMPERATURE: 98 F | DIASTOLIC BLOOD PRESSURE: 88 MMHG | OXYGEN SATURATION: 94 % | HEIGHT: 63 IN | BODY MASS INDEX: 37.21 KG/M2 | SYSTOLIC BLOOD PRESSURE: 141 MMHG

## 2025-06-18 DIAGNOSIS — M17.11 PRIMARY OSTEOARTHRITIS OF RIGHT KNEE: Primary | ICD-10-CM

## 2025-06-18 LAB
GLUCOSE BLDC GLUCOMTR-MCNC: 106 MG/DL (ref 70–99)
GLUCOSE BLDC GLUCOMTR-MCNC: 97 MG/DL (ref 70–99)

## 2025-06-18 PROCEDURE — 76000 FLUOROSCOPY <1 HR PHYS/QHP: CPT | Performed by: ORTHOPAEDIC SURGERY

## 2025-06-18 PROCEDURE — 64447 NJX AA&/STRD FEMORAL NRV IMG: CPT | Performed by: ORTHOPAEDIC SURGERY

## 2025-06-18 PROCEDURE — 82962 GLUCOSE BLOOD TEST: CPT

## 2025-06-18 RX ORDER — LIDOCAINE HYDROCHLORIDE 10 MG/ML
INJECTION, SOLUTION INFILTRATION; PERINEURAL
Status: COMPLETED | OUTPATIENT
Start: 2025-06-18 | End: 2025-06-18

## 2025-06-18 RX ORDER — NICOTINE POLACRILEX 4 MG
15 LOZENGE BUCCAL
Status: DISCONTINUED | OUTPATIENT
Start: 2025-06-18 | End: 2025-06-18

## 2025-06-18 RX ORDER — FAMOTIDINE 20 MG/1
20 TABLET, FILM COATED ORAL ONCE
Status: DISCONTINUED | OUTPATIENT
Start: 2025-06-18 | End: 2025-06-18 | Stop reason: HOSPADM

## 2025-06-18 RX ORDER — NICOTINE POLACRILEX 4 MG
30 LOZENGE BUCCAL
Status: DISCONTINUED | OUTPATIENT
Start: 2025-06-18 | End: 2025-06-18

## 2025-06-18 RX ORDER — METOCLOPRAMIDE 10 MG/1
10 TABLET ORAL ONCE
Status: COMPLETED | OUTPATIENT
Start: 2025-06-18 | End: 2025-06-18

## 2025-06-18 RX ORDER — ACETAMINOPHEN 500 MG
1000 TABLET ORAL ONCE
Status: COMPLETED | OUTPATIENT
Start: 2025-06-18 | End: 2025-06-18

## 2025-06-18 RX ORDER — NALOXONE HYDROCHLORIDE 0.4 MG/ML
0.08 INJECTION, SOLUTION INTRAMUSCULAR; INTRAVENOUS; SUBCUTANEOUS AS NEEDED
Status: DISCONTINUED | OUTPATIENT
Start: 2025-06-18 | End: 2025-06-18

## 2025-06-18 RX ORDER — MORPHINE SULFATE 10 MG/ML
6 INJECTION, SOLUTION INTRAMUSCULAR; INTRAVENOUS EVERY 10 MIN PRN
Status: DISCONTINUED | OUTPATIENT
Start: 2025-06-18 | End: 2025-06-18

## 2025-06-18 RX ORDER — TRAMADOL HYDROCHLORIDE 50 MG/1
50 TABLET ORAL EVERY 6 HOURS PRN
Qty: 28 TABLET | Refills: 0 | Status: SHIPPED | OUTPATIENT
Start: 2025-06-18

## 2025-06-18 RX ORDER — MORPHINE SULFATE 4 MG/ML
2 INJECTION, SOLUTION INTRAMUSCULAR; INTRAVENOUS EVERY 10 MIN PRN
Status: DISCONTINUED | OUTPATIENT
Start: 2025-06-18 | End: 2025-06-18

## 2025-06-18 RX ORDER — TRAMADOL HYDROCHLORIDE 50 MG/1
50 TABLET ORAL ONCE
Status: COMPLETED | OUTPATIENT
Start: 2025-06-18 | End: 2025-06-18

## 2025-06-18 RX ORDER — FAMOTIDINE 10 MG/ML
20 INJECTION, SOLUTION INTRAVENOUS ONCE
Status: DISCONTINUED | OUTPATIENT
Start: 2025-06-18 | End: 2025-06-18 | Stop reason: HOSPADM

## 2025-06-18 RX ORDER — DEXTROSE MONOHYDRATE 25 G/50ML
50 INJECTION, SOLUTION INTRAVENOUS
Status: DISCONTINUED | OUTPATIENT
Start: 2025-06-18 | End: 2025-06-18

## 2025-06-18 RX ORDER — LIDOCAINE HYDROCHLORIDE 10 MG/ML
INJECTION, SOLUTION EPIDURAL; INFILTRATION; INTRACAUDAL; PERINEURAL AS NEEDED
Status: DISCONTINUED | OUTPATIENT
Start: 2025-06-18 | End: 2025-06-18 | Stop reason: SURG

## 2025-06-18 RX ORDER — METOCLOPRAMIDE HYDROCHLORIDE 5 MG/ML
10 INJECTION INTRAMUSCULAR; INTRAVENOUS ONCE
Status: COMPLETED | OUTPATIENT
Start: 2025-06-18 | End: 2025-06-18

## 2025-06-18 RX ORDER — ONDANSETRON 2 MG/ML
INJECTION INTRAMUSCULAR; INTRAVENOUS AS NEEDED
Status: DISCONTINUED | OUTPATIENT
Start: 2025-06-18 | End: 2025-06-18 | Stop reason: SURG

## 2025-06-18 RX ORDER — HYDROMORPHONE HYDROCHLORIDE 1 MG/ML
0.6 INJECTION, SOLUTION INTRAMUSCULAR; INTRAVENOUS; SUBCUTANEOUS EVERY 5 MIN PRN
Status: DISCONTINUED | OUTPATIENT
Start: 2025-06-18 | End: 2025-06-18

## 2025-06-18 RX ORDER — ROPIVACAINE HYDROCHLORIDE 5 MG/ML
INJECTION, SOLUTION EPIDURAL; INFILTRATION; PERINEURAL
Status: COMPLETED | OUTPATIENT
Start: 2025-06-18 | End: 2025-06-18

## 2025-06-18 RX ORDER — DEXAMETHASONE SODIUM PHOSPHATE 4 MG/ML
VIAL (ML) INJECTION AS NEEDED
Status: DISCONTINUED | OUTPATIENT
Start: 2025-06-18 | End: 2025-06-18 | Stop reason: SURG

## 2025-06-18 RX ORDER — SODIUM CHLORIDE, SODIUM LACTATE, POTASSIUM CHLORIDE, CALCIUM CHLORIDE 600; 310; 30; 20 MG/100ML; MG/100ML; MG/100ML; MG/100ML
INJECTION, SOLUTION INTRAVENOUS CONTINUOUS
Status: DISCONTINUED | OUTPATIENT
Start: 2025-06-18 | End: 2025-06-18

## 2025-06-18 RX ORDER — HYDROMORPHONE HYDROCHLORIDE 1 MG/ML
0.2 INJECTION, SOLUTION INTRAMUSCULAR; INTRAVENOUS; SUBCUTANEOUS EVERY 5 MIN PRN
Status: DISCONTINUED | OUTPATIENT
Start: 2025-06-18 | End: 2025-06-18

## 2025-06-18 RX ORDER — HYDROMORPHONE HYDROCHLORIDE 1 MG/ML
0.4 INJECTION, SOLUTION INTRAMUSCULAR; INTRAVENOUS; SUBCUTANEOUS EVERY 5 MIN PRN
Status: DISCONTINUED | OUTPATIENT
Start: 2025-06-18 | End: 2025-06-18

## 2025-06-18 RX ORDER — MIDAZOLAM HYDROCHLORIDE 1 MG/ML
INJECTION INTRAMUSCULAR; INTRAVENOUS
Status: COMPLETED | OUTPATIENT
Start: 2025-06-18 | End: 2025-06-18

## 2025-06-18 RX ORDER — ONDANSETRON 2 MG/ML
4 INJECTION INTRAMUSCULAR; INTRAVENOUS EVERY 6 HOURS PRN
Status: DISCONTINUED | OUTPATIENT
Start: 2025-06-18 | End: 2025-06-18

## 2025-06-18 RX ORDER — MORPHINE SULFATE 4 MG/ML
4 INJECTION, SOLUTION INTRAMUSCULAR; INTRAVENOUS EVERY 10 MIN PRN
Status: DISCONTINUED | OUTPATIENT
Start: 2025-06-18 | End: 2025-06-18

## 2025-06-18 RX ORDER — PREDNISONE 20 MG/1
20 TABLET ORAL DAILY
Qty: 7 TABLET | Refills: 0 | Status: SHIPPED | OUTPATIENT
Start: 2025-06-18

## 2025-06-18 RX ADMIN — ONDANSETRON 4 MG: 2 INJECTION INTRAMUSCULAR; INTRAVENOUS at 15:59:00

## 2025-06-18 RX ADMIN — DEXAMETHASONE SODIUM PHOSPHATE 4 MG: 4 MG/ML VIAL (ML) INJECTION at 15:59:00

## 2025-06-18 RX ADMIN — LIDOCAINE HYDROCHLORIDE 50 MG: 10 INJECTION, SOLUTION EPIDURAL; INFILTRATION; INTRACAUDAL; PERINEURAL at 15:42:00

## 2025-06-18 RX ADMIN — LIDOCAINE HYDROCHLORIDE 3 ML: 10 INJECTION, SOLUTION INFILTRATION; PERINEURAL at 15:29:00

## 2025-06-18 RX ADMIN — MIDAZOLAM HYDROCHLORIDE 2 MG: 1 INJECTION INTRAMUSCULAR; INTRAVENOUS at 15:29:00

## 2025-06-18 RX ADMIN — ROPIVACAINE HYDROCHLORIDE 30 ML: 5 INJECTION, SOLUTION EPIDURAL; INFILTRATION; PERINEURAL at 15:29:00
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
## 2025-06-18 NOTE — ANESTHESIA PROCEDURE NOTES
Airway  Date/Time: 6/18/2025 3:45 PM  Reason: Elective      General Information and Staff   Patient location during procedure: OR  Anesthesiologist: Oppenheim, Stephen, MD  Performed: anesthesiologist   Performed by: Oppenheim, Stephen, MD  Authorized by: Oppenheim, Stephen, MD        Indications and Patient Condition  Indications for airway management: anesthesia  Sedation level: deep      Preoxygenated: yesPatient position: sniffing    Mask difficulty assessment: 1 - vent by mask    Final Airway Details    Final airway type: supraglottic airway      Successful airway: classic  Size: 4     Number of attempts at approach: 1

## 2025-06-18 NOTE — ANESTHESIA POSTPROCEDURE EVALUATION
Patient: Pedro TERRY Vieyra    Procedure Summary       Date: 06/18/25 Room / Location: [facility] MAIN OR  / [facility] MAIN OR    Anesthesia Start: 1538 Anesthesia Stop: 1617    Procedure: Right knee manipulation under anesthesia (Right) Diagnosis: (S/p total knee arthroplasty, right, knee stiffness, right)    Surgeons: Yang Brennan MD Anesthesiologist: Oppenheim, Stephen, MD    Anesthesia Type: general ASA Status: 2            Anesthesia Type: general    Vitals Value Taken Time   /98 06/18/25 16:25   Temp 97.7 06/18/25 16:27   Pulse 76 06/18/25 16:26   Resp 11 06/18/25 16:26   SpO2 100 % 06/18/25 16:26   Vitals shown include unfiled device data.    [facility] AN Post Evaluation:   Patient Evaluated in PACU  Patient Participation: complete - patient participated  Level of Consciousness: awake  Pain Management: adequateYes    Nausea/Vomiting: none  Cardiovascular Status: acceptable  Respiratory Status: acceptable  Postoperative Hydration acceptable      Stephen Oppenheim, MD  6/18/2025 4:27 PM
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
## 2025-06-18 NOTE — OPERATIVE REPORT
[facility]    PATIENT'S NAME: MARISSA NUNN   ATTENDING PHYSICIAN: Yang Brennan MD   OPERATING PHYSICIAN: Yang Brennan MD   PATIENT ACCOUNT#:   [id number]    LOCATION:  [facility] PACU 7 [facility] 10  MEDICAL RECORD #:   [id number]       YOB: 1960  ADMISSION DATE:       06/18/2025      OPERATION DATE:  06/18/2025    OPERATIVE REPORT      PREOPERATIVE DIAGNOSIS:  Right knee stiffness.  POSTOPERATIVE DIAGNOSIS:  Right knee stiffness.  PROCEDURE:  Right knee manipulation under anesthesia, Intra-operative use of fluoroscopy     ASSISTANT:  None.    INDICATIONS:  This is a pleasant 65-year-old female who is 3 months status post right total knee arthroplasty.  The patient has developed postoperative stiffness.  I had a long discussion with the patient and her  regarding the risks, benefits, and alternatives of surgical and nonsurgical intervention.  The risks surgery included, but were not limited to, bleeding, neurovascular injury, fracture, continued stiffness, and development of deep vein thrombosis.  The patient agreed to proceed with surgical intervention after the above risks were discussed in great detail.    OPERATIVE TECHNIQUE:  After the patient's informed consent was obtained, the patient's right knee was marked in the preoperative holding area.  She underwent adductor canal block by anesthesia service.  She was brought back to the operating room and intubated.  The right lower extremity was examined.  The patient had range of motion from 2 to 70 degrees.  Pre-manipulation C-arm films were obtained which revealed no fracture and appropriate placement of total knee arthroplasty.  Manipulation under anesthesia was then performed gently.  Audible cracks were heard of the breaking of the scar tissue in the knee.  The range of motion improved to 1 degree of extension and 120 degrees of flexion.  Post-manipulation films were obtained via the C-arm which revealed no fractures about  the proximal tibia or distal femur.  There was still an appropriately placed total knee arthroplasty.  At this time, the patient was awakened without incident and transferred to the recovery room in stable condition.    DISPOSITION:  The patient is weightbearing as tolerated on the right lower extremity.  She will begin physical therapy tomorrow.  She will follow up in 1 month for repeat evaluation.  She will receive Norco for pain control.  She will call our office with any questions or concerns.  She also received aspirin for DVT prophylaxis as well as prednisone as an anti-inflammatory.     Dictated By Yang Brennan MD  d: 06/18/2025 16:07:40  t: 06/18/2025 16:50:04  Job [id number]  GD/

## 2025-06-18 NOTE — ANESTHESIA PROCEDURE NOTES
Peripheral Block    Date/Time: 6/18/2025 3:29 PM    Performed by: Oppenheim, Stephen, MD  Authorized by: Oppenheim, Stephen, MD      General Information and Staff    Start Time:  6/18/2025 3:29 PM  End Time:  6/18/2025 3:32 PM  Anesthesiologist:  Oppenheim, Stephen, MD  Performed by:  Anesthesiologist  Patient Location:  PACU      Site Identification: real time ultrasound guided and image stored and retrievable      Reason for Block: at surgeon's request and post-op pain management    Preanesthetic Checklist: 2 patient identifers, IV checked, risks and benefits discussed, monitors and equipment checked, pre-op evaluation, timeout performed, anesthesia consent and sterile technique used      Procedure Details    Patient Position:  Supine  Prep: ChloraPrep    Monitoring:  Cardiac monitor  Block Type:  Saphenous  Injection Technique:  Single-shot    Needle    Needle Type:   Needle Gauge:  22 G  Needle Length:  90 mm  Reason for Ultrasound Use: appropriate spread of the medication was noted in real time            Assessment    Injection Assessment:  Good spread noted, incremental injection, low pressure, local visualized surrounding nerve on ultrasound, negative aspiration for heme and no pain on injection  Paresthesia Pain:  None  Heart Rate Change: No        Medications  6/18/2025 3:29 PM  midazolam (VERSED)injection 2mg/2ml - Intravenous   2 mg - 6/18/2025 3:29:00 PM  fentaNYL (SUBLIMAZE) injection 50mcg/ml - Intravenous   50 mcg - 6/18/2025 3:29:00 PM  lidocaine injection 1% - Intradermal   3 mL - 6/18/2025 3:29:00 PM  ropivacaine (NAROPIN) injection 0.5% - Infiltration   30 mL - 6/18/2025 3:29:00 PM    Additional Comments

## 2025-06-19 ENCOUNTER — OFFICE VISIT (OUTPATIENT)
Dept: PHYSICAL THERAPY | Age: 65
End: 2025-06-19
Attending: INTERNAL MEDICINE
Payer: COMMERCIAL

## 2025-06-19 ENCOUNTER — APPOINTMENT (OUTPATIENT)
Dept: PHYSICAL THERAPY | Age: 65
End: 2025-06-19
Attending: INTERNAL MEDICINE
Payer: COMMERCIAL

## 2025-06-19 PROCEDURE — 97110 THERAPEUTIC EXERCISES: CPT

## 2025-06-19 NOTE — PROGRESS NOTES
Patient: Pedro Vieyra (65 year old, female) Referring Provider:  Insurance:   Diagnosis: R Total Knee arthroplasty No ref. provider found  [place]   Date of Surgery: 3/19/2025 Next MD visit:  N/A   Precautions:  None No data recorded Referral Information:    Date of Evaluation: Req: 0, Auth: 0, Exp:     04/01/25 POC Auth Visits:  20       Today's Date   6/19/2025  Today's Date   6/19/2025    Subjective  Patient reports she had the manipulation yesterday.  She reports her knee is feeling much better.       Pain: 3/10     Objective  knee ROM (R): -11 deg to 68 deg  (start of session)  Knee PROM (R): 90 deg (end of session)       Assessment  Patient demos improved R knee ROM post manipulation and stretching.        Goals (to be met in 12 visits)   Goals: (to be met in 12 visits)     Not Met Progress Toward Partially Met Met   Patient will be independent with their home program, its progression, and management of residual symptoms. []  [x]  []  []    Patient will report pain of not more than 1/10 during activity. []  [x]  []  []    Patient will improve R knee ROM to WFL into all planes to improve LE mobility. []  [x]  []  []    Increase strength to 5/5 throughout to be able to perform previous activities without difficulty. []  [x]  []  []    Patient will verbalize and demonstrate strategies to improve posture and body mechanics during activity. []  [x]  []  []    Patient will resume all walking activities including going up and down steps without use of assistive device and normal gait pattern. []  [x]  []  []                                 Plan  continue PT to improve knee ROM and strength    Treatment Last 4 Visits  Treatment Day: 20 5/29/2025 6/5/2025 6/6/2025 6/19/2025   LE Treatment   Therapeutic Exercise - NuStep level 5 (UEs and LEs) x 8 minutes  - R forward flexion stretch on stair 2 x 10 5 sec holds   - R TKE against black Tband 2 x 10 5 sec holds  - standing B gastroc stretch on slant board level  3 3 x 30 sec holds  - long sitting R QS 5 reps 5 sec holds - NuStep level 5 x 9 minutes  - SciFit x 2 minutes  - standing B gastroc stretch on slant board level 3 3 x 30 sec holds  - supine R QS 20x 5 sec holds  - supine R SLR 10x  - supine R SLR with RTB above knees 10x  - prone R knee flexion 2 x 10  - prone R knee flexion with clinician assist 10x   - supine B knee AAROM with SB 3 x 10  - seated R knee flexion with RTB 2 x 10 5 sec holds  - seated R LAQs 3# 2 x 10 5 sec holds  - standing R knee flexion stretch at stair 10x 5 sec holds  - supine R knee flexion 10x   - NuStep level 5 (UEs and LEs) x 8 minutes  - standing B gastroc stretch on slant board level 4 3 x 30 sec holds  - shuttle machine B Knee ext/flx 5 bands 3 x 10  - shuttle machine R Knee ext/flx 4 bands 3 x 10  - standing B heel raises 2 x 10  - seated R knee flexion with 5# 3 x 10  - standing R/L hip abduction with GTB at ankles 3 x 10 ea  - standing marching x 1 minute x 2 reps  - standing R hip flexion with GTB at ankles 3 x 10  - prone R knee flexion with strap 10x 10 sec holds  - prone R knee flexion 3# 2 x 10   - NuStep level 5 (UEs and LEs) x 7 minutes  - long sitting B QS 10 sec holds 10 reps  - long sitting R heel slides with towel 10 sec holds 10x  - supine B knee flexion AAROM with SB 2 x 10  - supine R knee PROM   - prone R knee flexion stretch with strap 10x 10 sec holds   Manual Therapy - STM R ITB/lateral knee, R distal quad, R hamstring muscle      Therapeutic Exercise Minutes 30 54 45 50   Manual Therapy Minutes 10      Total Time Of Timed Procedures 40 54 45 50   Total Time Of Service-Based Procedures 0 0 0 0   Total Treatment Time 40 54 45 50        HEP  Verbal stretching every 2 hours    Charges      Ex 3

## 2025-06-20 ENCOUNTER — APPOINTMENT (OUTPATIENT)
Dept: PHYSICAL THERAPY | Age: 65
End: 2025-06-20
Attending: INTERNAL MEDICINE
Payer: COMMERCIAL

## 2025-06-20 ENCOUNTER — OFFICE VISIT (OUTPATIENT)
Dept: PHYSICAL THERAPY | Age: 65
End: 2025-06-20
Attending: INTERNAL MEDICINE
Payer: COMMERCIAL

## 2025-06-20 PROCEDURE — 97110 THERAPEUTIC EXERCISES: CPT

## 2025-06-20 NOTE — PROGRESS NOTES
Patient: Pedro Vieyra (65 year old, female) Referring Provider:  Insurance:   Diagnosis: R Total Knee arthroplasty No ref. provider found  [place]   Date of Surgery: 3/19/2025 Next MD visit:  N/A   Precautions:  None No data recorded Referral Information:    Date of Evaluation: Req: 0, Auth: 0, Exp:     04/01/25 POC Auth Visits:  30       Today's Date   6/20/2025    Subjective  Patient reports her knee is more swollen and painful today.       Pain: 8/10     Objective  6/20/2025: notale increased R knee edema observed         Assessment  Session focused on improving knee flexion and extension ROM.  Pt with tendency to drop RLE into ER - instructed pt to avoid this and try to maintain neutral alignment.         Goals: (to be met in 12 visits)     Not Met Progress Toward Partially Met Met   Patient will be independent with their home program, its progression, and management of residual symptoms. []  [x]  []  []    Patient will report pain of not more than 1/10 during activity. []  [x]  []  []    Patient will improve R knee ROM to WFL into all planes to improve LE mobility. []  [x]  []  []    Increase strength to 5/5 throughout to be able to perform previous activities without difficulty. []  [x]  []  []    Patient will verbalize and demonstrate strategies to improve posture and body mechanics during activity. []  [x]  []  []    Patient will resume all walking activities including going up and down steps without use of assistive device and normal gait pattern. []  [x]  []  []                                     Plan  continue PT to improve knee ROM and strength    Treatment Last 4 Visits  Treatment Day: 21 6/5/2025 6/6/2025 6/19/2025 6/20/2025   LE Treatment   Therapeutic Exercise - NuStep level 5 x 9 minutes  - SciFit x 2 minutes  - standing B gastroc stretch on slant board level 3 3 x 30 sec holds  - supine R QS 20x 5 sec holds  - supine R SLR 10x  - supine R SLR with RTB above knees 10x  - prone R knee  flexion 2 x 10  - prone R knee flexion with clinician assist 10x   - supine B knee AAROM with SB 3 x 10  - seated R knee flexion with RTB 2 x 10 5 sec holds  - seated R LAQs 3# 2 x 10 5 sec holds  - standing R knee flexion stretch at stair 10x 5 sec holds  - supine R knee flexion 10x   - NuStep level 5 (UEs and LEs) x 8 minutes  - standing B gastroc stretch on slant board level 4 3 x 30 sec holds  - shuttle machine B Knee ext/flx 5 bands 3 x 10  - shuttle machine R Knee ext/flx 4 bands 3 x 10  - standing B heel raises 2 x 10  - seated R knee flexion with 5# 3 x 10  - standing R/L hip abduction with GTB at ankles 3 x 10 ea  - standing marching x 1 minute x 2 reps  - standing R hip flexion with GTB at ankles 3 x 10  - prone R knee flexion with strap 10x 10 sec holds  - prone R knee flexion 3# 2 x 10   - NuStep level 5 (UEs and LEs) x 7 minutes  - long sitting B QS 10 sec holds 10 reps  - long sitting R heel slides with towel 10 sec holds 10x  - supine B knee flexion AAROM with SB 2 x 10  - supine R knee PROM   - prone R knee flexion stretch with strap 10x 10 sec holds - NuStep level 4 (UEs and LEs) x 8 minutes  - standing R knee flexion/extension stretch on stair 10x ea  - standing R TKE with black Tband 10x 5 sec holds  - long sitting R heel slides with towel 10x  - long sitting R knee extension stretch with towel under ankle x 2 minutes  - sitting knee extension stretch with foot on chair x 1 minute  - supine R knee PROM by clinician   Therapeutic Exercise Minutes 54 45 45 50   Total Time Of Timed Procedures 54 45 45 50   Total Time Of Service-Based Procedures 0 0 0 0   Total Treatment Time 54 45 45 50   HEP    - updated HEP - see pt instructions section        HEP  - updated HEP - see pt instructions section    Charges          Ex 3

## 2025-06-23 ENCOUNTER — OFFICE VISIT (OUTPATIENT)
Dept: PHYSICAL THERAPY | Age: 65
End: 2025-06-23
Attending: INTERNAL MEDICINE
Payer: COMMERCIAL

## 2025-06-23 PROCEDURE — 97110 THERAPEUTIC EXERCISES: CPT

## 2025-06-23 NOTE — PROGRESS NOTES
Patient: Pedro Vieyra (65 year old, female) Referring Provider:  Insurance:   Diagnosis: R Total Knee arthroplasty No ref. provider found  [place]   Date of Surgery: 3/19/2025 Next MD visit:  N/A   Precautions:  None No data recorded Referral Information:    Date of Evaluation: Req: 0, Auth: 0, Exp:     04/01/25 POC Auth Visits:  30       Today's Date   6/23/2025    Subjective  Patient reports compliance with her exercises every hour.  She states she went to the beach yesterday and was able to walk on the sand which was fine, but tiring.       Pain: 5/10     Objective         6/23/2025:  Flx: 75 deg       Assessment  Patient with increased edema in her right knee.   Continued with knee ROM stretching.          Goals: (to be met in 12 visits)     Not Met Progress Toward Partially Met Met   Patient will be independent with their home program, its progression, and management of residual symptoms. []  [x]  []  []    Patient will report pain of not more than 1/10 during activity. []  [x]  []  []    Patient will improve R knee ROM to WFL into all planes to improve LE mobility. []  [x]  []  []    Increase strength to 5/5 throughout to be able to perform previous activities without difficulty. []  [x]  []  []    Patient will verbalize and demonstrate strategies to improve posture and body mechanics during activity. []  [x]  []  []    Patient will resume all walking activities including going up and down steps without use of assistive device and normal gait pattern. []  [x]  []  []                                     Plan  continue PT to improve knee ROM and strength    Treatment Last 4 Visits  Treatment Day: 21 6/6/2025 6/19/2025 6/20/2025 6/23/2025   LE Treatment   Therapeutic Exercise - NuStep level 5 (UEs and LEs) x 8 minutes  - standing B gastroc stretch on slant board level 4 3 x 30 sec holds  - shuttle machine B Knee ext/flx 5 bands 3 x 10  - shuttle machine R Knee ext/flx 4 bands 3 x 10  - standing B heel  raises 2 x 10  - seated R knee flexion with 5# 3 x 10  - standing R/L hip abduction with GTB at ankles 3 x 10 ea  - standing marching x 1 minute x 2 reps  - standing R hip flexion with GTB at ankles 3 x 10  - prone R knee flexion with strap 10x 10 sec holds  - prone R knee flexion 3# 2 x 10   - NuStep level 5 (UEs and LEs) x 7 minutes  - long sitting B QS 10 sec holds 10 reps  - long sitting R heel slides with towel 10 sec holds 10x  - supine B knee flexion AAROM with SB 2 x 10  - supine R knee PROM   - prone R knee flexion stretch with strap 10x 10 sec holds - NuStep level 4 (UEs and LEs) x 8 minutes  - standing R knee flexion/extension stretch on stair 10x ea  - standing R TKE with black Tband 10x 5 sec holds  - long sitting R heel slides with towel 10x  - long sitting R knee extension stretch with towel under ankle x 2 minutes  - sitting knee extension stretch with foot on chair x 1 minute  - supine R knee PROM by clinician - NuStep level 7 (UEs and LEs) x 7 minutes  - standing B heel raises on slant board level 2 2 x 10  - standing B gastroc stretch on slant board level 3 3 x 30 sec holds  - shuttle machine B Knee ext/flx 4 bands 3 x 10  - standing R knee flexion stretch on stair 10x  - supine B knee flexion AAROM with SB 30x  - supine R QS with towel under knee 10x 5 sec holds  - supine R SLR 10x  - long sitting R heel slides with towel 10x, 5x  - prone R knee flexion AROM 10x  - supine R knee flexion PROM 10x  - prone R knee flexion PROM 10x   Manual Therapy    - STM R medial distal quad muscle   Therapeutic Exercise Minutes 45 45 50 55   Manual Therapy Minutes    5   Total Time Of Timed Procedures 45 45 50 60   Total Time Of Service-Based Procedures 0 0 0 0   Total Treatment Time 45 45 50 60   HEP   - updated HEP - see pt instructions section         Ice x 10 minutes to R knee at end of session (70 minutes)    HEP  Continue current HEP    Charges   Ex 4

## 2025-06-25 ENCOUNTER — OFFICE VISIT (OUTPATIENT)
Dept: PHYSICAL THERAPY | Age: 65
End: 2025-06-25
Attending: INTERNAL MEDICINE
Payer: COMMERCIAL

## 2025-06-25 PROCEDURE — 97110 THERAPEUTIC EXERCISES: CPT

## 2025-06-25 NOTE — PROGRESS NOTES
Patient: Pedro Vieyra (65 year old, female) Referring Provider:  Insurance:   Diagnosis: R Total Knee arthroplasty No ref. provider found  [employer] [place]   Date of Surgery: 3/19/2025 Next MD visit:  N/A   Precautions:  None No data recorded Referral Information:    Date of Evaluation: Req: 0, Auth: 0, Exp:     04/01/25 POC Auth Visits:  30       Today's Date   6/25/2025    Subjective  Patient reports her knee is bruised.  She reports she rested her knee yesterday.       Pain: 4/10     Objective          6/25/2025:  Knee PROM (R):  Ext: not measured today  Flx: 82 deg       Assessment  Continued with knee ROM stretching and initiated additional hamstring strengthening.           Goals: (to be met in 12 visits)     Not Met Progress Toward Partially Met Met   Patient will be independent with their home program, its progression, and management of residual symptoms. []  [x]  []  []    Patient will report pain of not more than 1/10 during activity. []  [x]  []  []    Patient will improve R knee ROM to WFL into all planes to improve LE mobility. []  [x]  []  []    Increase strength to 5/5 throughout to be able to perform previous activities without difficulty. []  [x]  []  []    Patient will verbalize and demonstrate strategies to improve posture and body mechanics during activity. []  [x]  []  []    Patient will resume all walking activities including going up and down steps without use of assistive device and normal gait pattern. []  [x]  []  []                                     Plan  continue PT to improve knee ROM and strength    Treatment Last 4 Visits  Treatment Day: 22 6/19/2025 6/20/2025 6/23/2025 6/25/2025   LE Treatment   Therapeutic Exercise - NuStep level 5 (UEs and LEs) x 7 minutes  - long sitting B QS 10 sec holds 10 reps  - long sitting R heel slides with towel 10 sec holds 10x  - supine B knee flexion AAROM with SB 2 x 10  - supine R knee PROM   - prone R knee flexion stretch with strap 10x 10 sec  holds - NuStep level 4 (UEs and LEs) x 8 minutes  - standing R knee flexion/extension stretch on stair 10x ea  - standing R TKE with black Tband 10x 5 sec holds  - long sitting R heel slides with towel 10x  - long sitting R knee extension stretch with towel under ankle x 2 minutes  - sitting knee extension stretch with foot on chair x 1 minute  - supine R knee PROM by clinician - NuStep level 7 (UEs and LEs) x 7 minutes  - standing B heel raises on slant board level 2 2 x 10  - standing B gastroc stretch on slant board level 3 3 x 30 sec holds  - shuttle machine B Knee ext/flx 4 bands 3 x 10  - standing R knee flexion stretch on stair 10x  - supine B knee flexion AAROM with SB 30x  - supine R QS with towel under knee 10x 5 sec holds  - supine R SLR 10x  - long sitting R heel slides with towel 10x, 5x  - prone R knee flexion AROM 10x  - supine R knee flexion PROM 10x  - prone R knee flexion PROM 10x - NuStep level 4 (UEs and LEs) x 12 minutes  - supine B knee flexion AAROM with SB 2 x 20  - supine R knee flexion PROM   - long sitting R QS with towel under knee 5 sec holds 10x  - standing B gastroc stretch on slant board level 3 3 x 30 sec holds  - prone R knee flexion PROM by clinician 10x 10 sec holds  - prone R knee flexion stretch with strap 10x 10 sec holds  - prone R knee bends 10x  - seated R knee flexion with GTB at ankles 2 x 10 5 sec holds   Manual Therapy   - STM R medial distal quad muscle    Therapeutic Exercise Minutes 45 50 55 55   Manual Therapy Minutes   5    Total Time Of Timed Procedures 45 50 60 55   Total Time Of Service-Based Procedures 0 0 0 0   Total Treatment Time 45 50 60 55   HEP  - updated HEP - see pt instructions section  GTB provided for home use        HEP  GTB provided for home use    Charges        Ex 4

## 2025-06-27 ENCOUNTER — OFFICE VISIT (OUTPATIENT)
Dept: PHYSICAL THERAPY | Age: 65
End: 2025-06-27
Attending: PHYSICAL THERAPIST
Payer: COMMERCIAL

## 2025-06-27 PROCEDURE — 97140 MANUAL THERAPY 1/> REGIONS: CPT | Performed by: PHYSICAL THERAPIST

## 2025-06-27 PROCEDURE — 97110 THERAPEUTIC EXERCISES: CPT | Performed by: PHYSICAL THERAPIST

## 2025-06-27 NOTE — PROGRESS NOTES
Patient: Pedro Vieyra (65 year old, female) Referring Provider:  Insurance:   Diagnosis: R Total Knee arthroplasty No ref. provider found  [employer] [place]   Date of Surgery: 3/19/2025 Next MD visit:  N/A   Precautions:  None No data recorded Referral Information:    Date of Evaluation: Req: 0, Auth: 0, Exp:     04/01/25 POC Auth Visits:  30       Today's Date   6/27/2025    Subjective  Patient complains of R knee pain       Pain: 4/10     Objective  R knee ROM: 15 to 80 deg. Gait: walks independently with a cane. Demonstrates decreased terminal knee extension with limited knee flexion.        Assessment  Demonstrates improved R knee mobility after therapeutic exercises and manual PT. Patient and PT goals in progress.    Goals (to be met in 12 visits)      Not Met Progress Toward Partially Met Met   Patient will be independent with their home program, its progression, and management of residual symptoms. [] [x] [] []   Patient will report pain of not more than 1/10 during activity. [] [x] [] []   Patient will improve R knee ROM to WFL into all planes to improve LE mobility. [] [x] [] []   Increase strength to 5/5 throughout to be able to perform previous activities without difficulty. [] [x] [] []   Patient will verbalize and demonstrate strategies to improve posture and body mechanics during activity. [] [x] [] []   Patient will resume all walking activities including going up and down steps without use of assistive device and normal gait pattern. [] [x] [] []                                  Plan       Treatment Last 4 Visits  Treatment Day: 23 6/20/2025 6/23/2025 6/25/2025 6/27/2025   LE Treatment   Therapeutic Exercise - NuStep level 4 (UEs and LEs) x 8 minutes  - standing R knee flexion/extension stretch on stair 10x ea  - standing R TKE with black Tband 10x 5 sec holds  - long sitting R heel slides with towel 10x  - long sitting R knee extension stretch with towel under ankle x 2 minutes  - sitting knee  extension stretch with foot on chair x 1 minute  - supine R knee PROM by clinician - NuStep level 7 (UEs and LEs) x 7 minutes  - standing B heel raises on slant board level 2 2 x 10  - standing B gastroc stretch on slant board level 3 3 x 30 sec holds  - shuttle machine B Knee ext/flx 4 bands 3 x 10  - standing R knee flexion stretch on stair 10x  - supine B knee flexion AAROM with SB 30x  - supine R QS with towel under knee 10x 5 sec holds  - supine R SLR 10x  - long sitting R heel slides with towel 10x, 5x  - prone R knee flexion AROM 10x  - supine R knee flexion PROM 10x  - prone R knee flexion PROM 10x - NuStep level 4 (UEs and LEs) x 12 minutes  - supine B knee flexion AAROM with SB 2 x 20  - supine R knee flexion PROM   - long sitting R QS with towel under knee 5 sec holds 10x  - standing B gastroc stretch on slant board level 3 3 x 30 sec holds  - prone R knee flexion PROM by clinician 10x 10 sec holds  - prone R knee flexion stretch with strap 10x 10 sec holds  - prone R knee bends 10x  - seated R knee flexion with GTB at ankles 2 x 10 5 sec holds X25min  - supine quad sets with ankle DF 10 x 2  - prone R knee flexion AROM 10 x 2  - manual PT  - repeat prone R knee flexion AROM  - seated R knee extensions  - R knee stretching into flexion and extension on stairs  - reviewed HEP  - walking on treadmill x 5min with cues to improve heel strike and terminal knee extension, knee flexion   Manual Therapy  - STM R medial distal quad muscle  X25min  - R knee mobilization into extension while sitting  - R knee mobilization into flexion and extension while supine  - prone R knee mobilization into flexion   Therapeutic Exercise Minutes 50 55 55 25   Manual Therapy Minutes  5  25   Total Time Of Timed Procedures 50 60 55 50   Total Time Of Service-Based Procedures 0 0 0 0   Total Treatment Time 50 60 55 50   HEP - updated HEP - see pt instructions section  GTB provided for home use           Charges     EX2, Manual  PT2

## 2025-06-30 ENCOUNTER — OFFICE VISIT (OUTPATIENT)
Dept: PHYSICAL THERAPY | Age: 65
End: 2025-06-30
Attending: PHYSICAL THERAPIST
Payer: COMMERCIAL

## 2025-06-30 PROCEDURE — 97140 MANUAL THERAPY 1/> REGIONS: CPT | Performed by: PHYSICAL THERAPIST

## 2025-06-30 PROCEDURE — 97110 THERAPEUTIC EXERCISES: CPT | Performed by: PHYSICAL THERAPIST

## 2025-06-30 NOTE — PROGRESS NOTES
Patient: Pedro Vieyra (65 year old, female) Referring Provider:  Insurance:   Diagnosis: R Total Knee arthroplasty No ref. provider found  [employer] [place]   Date of Surgery: 3/19/2025 Next MD visit:  N/A   Precautions:  None No data recorded Referral Information:    Date of Evaluation: Req: 0, Auth: 0, Exp:     04/01/25 POC Auth Visits:  30       Today's Date   6/30/2025    Subjective  Patient complains R knee stiffness       Pain: 4/10     Objective  R knee ROM: 15 to 80 deg. Gait: walks independently with a cane. Demonstrates decreased terminal knee extension with limited knee flexion.          Assessment  Patient with improved LE mobility after therapeutic exercises. Gait improved as quad strength increased. Patient and PT goals are in progress.    Goals (to be met in 12 visits)      Not Met Progress Toward Partially Met Met   Patient will be independent with their home program, its progression, and management of residual symptoms. [] [x] [] []   Patient will report pain of not more than 1/10 during activity. [] [x] [] []   Patient will improve R knee ROM to WFL into all planes to improve LE mobility. [] [x] [] []   Increase strength to 5/5 throughout to be able to perform previous activities without difficulty. [] [x] [] []   Patient will verbalize and demonstrate strategies to improve posture and body mechanics during activity. [] [x] [] []   Patient will resume all walking activities including going up and down steps without use of assistive device and normal gait pattern. [] [x] [] []                                      Plan  continue PT to improve knee ROM and strength    Treatment Last 4 Visits  Treatment Day: 24 6/23/2025 6/25/2025 6/27/2025 6/30/2025   LE Treatment   Therapeutic Exercise - NuStep level 7 (UEs and LEs) x 7 minutes  - standing B heel raises on slant board level 2 2 x 10  - standing B gastroc stretch on slant board level 3 3 x 30 sec holds  - shuttle machine B Knee ext/flx 4 bands 3 x  10  - standing R knee flexion stretch on stair 10x  - supine B knee flexion AAROM with SB 30x  - supine R QS with towel under knee 10x 5 sec holds  - supine R SLR 10x  - long sitting R heel slides with towel 10x, 5x  - prone R knee flexion AROM 10x  - supine R knee flexion PROM 10x  - prone R knee flexion PROM 10x - NuStep level 4 (UEs and LEs) x 12 minutes  - supine B knee flexion AAROM with SB 2 x 20  - supine R knee flexion PROM   - long sitting R QS with towel under knee 5 sec holds 10x  - standing B gastroc stretch on slant board level 3 3 x 30 sec holds  - prone R knee flexion PROM by clinician 10x 10 sec holds  - prone R knee flexion stretch with strap 10x 10 sec holds  - prone R knee bends 10x  - seated R knee flexion with GTB at ankles 2 x 10 5 sec holds X25min  - supine quad sets with ankle DF 10 x 2  - prone R knee flexion AROM 10 x 2  - manual PT  - repeat prone R knee flexion AROM  - seated R knee extensions  - R knee stretching into flexion and extension on stairs  - reviewed HEP  - walking on treadmill x 5min with cues to improve heel strike and terminal knee extension, knee flexion X40min  - R knee extension and calf stretch 30 sec x 4  - R knee flexion stretch on stairs 10 x 2  - seated LAQ's 20x  - instructed on additional HEP  - 6 inch forward step up with manual cues to improve quad control 10x 2  -  inch forward step up with manual cues to improve quad control 10 x 2  - prone R knee flexion AROM 10 x 2  - Shuttle B LE leg press 6 bands 10 x 3  - Shuttle single LE leg press 4 bands 10 x 3  - instructed on additional HEP   Manual Therapy - STM R medial distal quad muscle  X25min  - R knee mobilization into extension while sitting  - R knee mobilization into flexion and extension while supine  - prone R knee mobilization into flexion X10min  - R knee mobilization into flexion and extension in prone    Therapeutic Exercise Minutes 55 55 25 40   Manual Therapy Minutes 5  25 10   Total Time Of Timed  Procedures 60 55 50 50   Total Time Of Service-Based Procedures 0 0 0 0   Total Treatment Time 60 55 50 50   HEP  GTB provided for home use          HEP  GTB provided for home use    Charges     EX3, manualPT1

## 2025-07-02 ENCOUNTER — OFFICE VISIT (OUTPATIENT)
Dept: PHYSICAL THERAPY | Age: 65
End: 2025-07-02
Attending: INTERNAL MEDICINE
Payer: COMMERCIAL

## 2025-07-02 PROCEDURE — 97110 THERAPEUTIC EXERCISES: CPT

## 2025-07-02 NOTE — PROGRESS NOTES
Patient: Pedro Vieyra (65 year old, female) Referring Provider:  Insurance:   Diagnosis: R Total Knee arthroplasty No ref. provider found  [employer] [place]   Date of Surgery: 3/19/2025 Next MD visit:  N/A   Precautions:  None No data recorded Referral Information:    Date of Evaluation: Req: 0, Auth: 0, Exp:     04/01/25 POC Auth Visits:  30       Today's Date   7/2/2025    Subjective  Patient reports her knee feels stiff.           Pain: 2/10     Objective          See tx log below       Assessment   Continued with focus on quad activation and improving knee ROM.           Goals: (to be met in 12 visits)     Not Met Progress Toward Partially Met Met   Patient will be independent with their home program, its progression, and management of residual symptoms. []  [x]  []  []    Patient will report pain of not more than 1/10 during activity. []  [x]  []  []    Patient will improve R knee ROM to WFL into all planes to improve LE mobility. []  [x]  []  []    Increase strength to 5/5 throughout to be able to perform previous activities without difficulty. []  [x]  []  []    Patient will verbalize and demonstrate strategies to improve posture and body mechanics during activity. []  [x]  []  []    Patient will resume all walking activities including going up and down steps without use of assistive device and normal gait pattern. []  [x]  []  []                                     Plan  continue PT to improve knee ROM and strength    Treatment Last 4 Visits  Treatment Day: 25 6/25/2025 6/27/2025 6/30/2025 7/2/2025   LE Treatment   Therapeutic Exercise - NuStep level 4 (UEs and LEs) x 12 minutes  - supine B knee flexion AAROM with SB 2 x 20  - supine R knee flexion PROM   - long sitting R QS with towel under knee 5 sec holds 10x  - standing B gastroc stretch on slant board level 3 3 x 30 sec holds  - prone R knee flexion PROM by clinician 10x 10 sec holds  - prone R knee flexion stretch with strap 10x 10 sec holds  - prone  R knee bends 10x  - seated R knee flexion with GTB at ankles 2 x 10 5 sec holds X25min  - supine quad sets with ankle DF 10 x 2  - prone R knee flexion AROM 10 x 2  - manual PT  - repeat prone R knee flexion AROM  - seated R knee extensions  - R knee stretching into flexion and extension on stairs  - reviewed HEP  - walking on treadmill x 5min with cues to improve heel strike and terminal knee extension, knee flexion X40min  - R knee extension and calf stretch 30 sec x 4  - R knee flexion stretch on stairs 10 x 2  - seated LAQ's 20x  - instructed on additional HEP  - 6 inch forward step up with manual cues to improve quad control 10x 2  -  inch forward step up with manual cues to improve quad control 10 x 2  - prone R knee flexion AROM 10 x 2  - Shuttle B LE leg press 6 bands 10 x 3  - Shuttle single LE leg press 4 bands 10 x 3  - instructed on additional HEP - NuStep level 6 (UEs and LEs) x 8 minutes  - standing B gastroc stretch on slant board level 3 3 x 30 sec holds  - standing R forward step up to 4 inch step 2 x 10  - standing B knee flexion AAROM with SB 20x  - long sitting R QS 10x 10 sec holds  - supine R QS 10x 5 sec holds  - supine R SLR 10x  - supine R SLR with YTB above knees 2 x 10  - prone R TKE 2 x 10  - Shuttle B LE leg press 6 bands 10 x 3   - Shuttle single LE leg press 4 bands 10 x 3      Manual Therapy  X25min  - R knee mobilization into extension while sitting  - R knee mobilization into flexion and extension while supine  - prone R knee mobilization into flexion X10min  - R knee mobilization into flexion and extension in prone     Therapeutic Exercise Minutes 55 25 40 45   Manual Therapy Minutes  25 10    Total Time Of Timed Procedures 55 50 50 45   Total Time Of Service-Based Procedures 0 0 0 0   Total Treatment Time 55 50 50 45   HEP GTB provided for home use           HEP  Continue current HEP    Charges      Ex 3

## 2025-07-03 ENCOUNTER — OFFICE VISIT (OUTPATIENT)
Dept: PHYSICAL THERAPY | Age: 65
End: 2025-07-03
Attending: INTERNAL MEDICINE
Payer: COMMERCIAL

## 2025-07-03 PROCEDURE — 97110 THERAPEUTIC EXERCISES: CPT

## 2025-07-03 NOTE — PROGRESS NOTES
Patient: Pedro Vieyra (65 year old, female) Referring Provider:  Insurance:   Diagnosis: R Total Knee arthroplasty No ref. provider found  [employer] [place]   Date of Surgery: 3/19/2025 Next MD visit:  N/A   Precautions:  None No data recorded Referral Information:    Date of Evaluation: Req: 0, Auth: 0, Exp:     04/01/25 POC Auth Visits:  30       Today's Date   7/3/2025    Subjective  She reports some right knee stiffness.       Pain: 3/10     Objective          See tx log below       Assessment   Focus on exercises to promote weightbearing through RLE in standing and continued with quad strengthening and knee ROM stretching.       Goals: (to be met in 12 visits)     Not Met Progress Toward Partially Met Met   Patient will be independent with their home program, its progression, and management of residual symptoms. []  [x]  []  []    Patient will report pain of not more than 1/10 during activity. []  [x]  []  []    Patient will improve R knee ROM to WFL into all planes to improve LE mobility. []  [x]  []  []    Increase strength to 5/5 throughout to be able to perform previous activities without difficulty. []  [x]  []  []    Patient will verbalize and demonstrate strategies to improve posture and body mechanics during activity. []  [x]  []  []    Patient will resume all walking activities including going up and down steps without use of assistive device and normal gait pattern. []  [x]  []  []                                     Plan  continue PT to improve knee ROM and strength    Treatment Last 4 Visits  Treatment Day: 26       6/27/2025 6/30/2025 7/2/2025 7/3/2025   LE Treatment   Therapeutic Exercise X25min  - supine quad sets with ankle DF 10 x 2  - prone R knee flexion AROM 10 x 2  - manual PT  - repeat prone R knee flexion AROM  - seated R knee extensions  - R knee stretching into flexion and extension on stairs  - reviewed HEP  - walking on treadmill x 5min with cues to improve heel strike and terminal knee  extension, knee flexion X40min  - R knee extension and calf stretch 30 sec x 4  - R knee flexion stretch on stairs 10 x 2  - seated LAQ's 20x  - instructed on additional HEP  - 6 inch forward step up with manual cues to improve quad control 10x 2  -  inch forward step up with manual cues to improve quad control 10 x 2  - prone R knee flexion AROM 10 x 2  - Shuttle B LE leg press 6 bands 10 x 3  - Shuttle single LE leg press 4 bands 10 x 3  - instructed on additional HEP - NuStep level 6 (UEs and LEs) x 8 minutes  - standing B gastroc stretch on slant board level 3 3 x 30 sec holds  - standing R forward step up to 4 inch step 2 x 10  - standing B knee flexion AAROM with SB 20x  - long sitting R QS 10x 10 sec holds  - supine R QS 10x 5 sec holds  - supine R SLR 10x  - supine R SLR with YTB above knees 2 x 10  - prone R TKE 2 x 10  - Shuttle B LE leg press 6 bands 10 x 3   - Shuttle single LE leg press 4 bands 10 x 3    - NuStep level 6 (UEs and LEs) x 8 minutes  - prone R bends 3# 2 x 10  - prone R hip extension 2 x 10   - prone R TKE 2 x 10 5 sec holds  - prone R knee flexion with strap 10x 10 sec holds  - supine R QS 2 x 10 5 sec holds  - supine R SLR with RTB above knees 2 x 10  - shuttle machine B Knee ext/flx 6 bands 3 x 10  - shuttle machine R knee ext/flx 4 bands 3 x 10  - R/L hip abduction/hip extension/hip flexion with GTB above knees 2 x 10 ea  - standing marching x 1 minute     Manual Therapy X25min  - R knee mobilization into extension while sitting  - R knee mobilization into flexion and extension while supine  - prone R knee mobilization into flexion X10min  - R knee mobilization into flexion and extension in prone      Therapeutic Exercise Minutes 25 40 45 46   Manual Therapy Minutes 25 10     Total Time Of Timed Procedures 50 50 45 46   Total Time Of Service-Based Procedures 0 0 0 0   Total Treatment Time 50 50 45 46        HEP  RTB provided for home use    Charges        Ex 3

## 2025-07-07 ENCOUNTER — OFFICE VISIT (OUTPATIENT)
Dept: PHYSICAL THERAPY | Age: 65
End: 2025-07-07
Attending: INTERNAL MEDICINE
Payer: COMMERCIAL

## 2025-07-07 PROCEDURE — 97110 THERAPEUTIC EXERCISES: CPT

## 2025-07-07 NOTE — PROGRESS NOTES
Patient: Pedro Vieyra (65 year old, female) Referring Provider:  Insurance:   Diagnosis: R Total Knee arthroplasty No ref. provider found  [employer] [place]   Date of Surgery: 3/19/2025 Next MD visit:  N/A   Precautions:  None No data recorded Referral Information:    Date of Evaluation: Req: 0, Auth: 0, Exp:     04/01/25 POC Auth Visits:  30       Today's Date   7/7/2025    Subjective  Patient reports her knee is doing much better.       Pain: 3/10     Objective          See tx log below       Assessment   Patient displaying improved quad strength noted during performance of straight leg raises.         Goals: (to be met in 12 visits)     Not Met Progress Toward Partially Met Met   Patient will be independent with their home program, its progression, and management of residual symptoms. []  [x]  []  []    Patient will report pain of not more than 1/10 during activity. []  [x]  []  []    Patient will improve R knee ROM to WFL into all planes to improve LE mobility. []  [x]  []  []    Increase strength to 5/5 throughout to be able to perform previous activities without difficulty. []  [x]  []  []    Patient will verbalize and demonstrate strategies to improve posture and body mechanics during activity. []  [x]  []  []    Patient will resume all walking activities including going up and down steps without use of assistive device and normal gait pattern. []  [x]  []  []                                     Plan  continue PT to improve knee ROM and strength    Treatment Last 4 Visits  Treatment Day: 27       6/30/2025 7/2/2025 7/3/2025 7/7/2025   LE Treatment   Therapeutic Exercise X40min  - R knee extension and calf stretch 30 sec x 4  - R knee flexion stretch on stairs 10 x 2  - seated LAQ's 20x  - instructed on additional HEP  - 6 inch forward step up with manual cues to improve quad control 10x 2  -  inch forward step up with manual cues to improve quad control 10 x 2  - prone R knee flexion AROM 10 x 2  - Shuttle B LE  leg press 6 bands 10 x 3  - Shuttle single LE leg press 4 bands 10 x 3  - instructed on additional HEP - NuStep level 6 (UEs and LEs) x 8 minutes  - standing B gastroc stretch on slant board level 3 3 x 30 sec holds  - standing R forward step up to 4 inch step 2 x 10  - standing B knee flexion AAROM with SB 20x  - long sitting R QS 10x 10 sec holds  - supine R QS 10x 5 sec holds  - supine R SLR 10x  - supine R SLR with YTB above knees 2 x 10  - prone R TKE 2 x 10  - Shuttle B LE leg press 6 bands 10 x 3   - Shuttle single LE leg press 4 bands 10 x 3    - NuStep level 6 (UEs and LEs) x 8 minutes  - prone R bends 3# 2 x 10  - prone R hip extension 2 x 10   - prone R TKE 2 x 10 5 sec holds  - prone R knee flexion with strap 10x 10 sec holds  - supine R QS 2 x 10 5 sec holds  - supine R SLR with RTB above knees 2 x 10  - shuttle machine B Knee ext/flx 6 bands 3 x 10  - shuttle machine R knee ext/flx 4 bands 3 x 10  - R/L hip abduction/hip extension/hip flexion with GTB above knees 2 x 10 ea  - standing marching x 1 minute   - NuStep (UEs and LEs) level 5 minutes  - supine R heel slides 15x  - long sitting R QS 10x 10 sec holds  - supine R SLR with RTB above knees 1 x 10  - shuttle machine B knee ext/flx 6 bands 3 x 10 (eccentric emphasis- slow & controlled)  - shuttle machine R knee ext/flx 3-4 bands 1 x 10  (eccentric emphasis- slow & controlled)  - standing B gastroc stretch on slant board level 3 3 x 30 sec holds     Neuro Re-Education    - standing marching x 1 minute  - sidestepping with RTB above knees 4 x 60 feet        Manual Therapy X10min  - R knee mobilization into flexion and extension in prone       Gait Training    - gait training without straight cane  - gait training - retro walking with emphasis on promoting TKE   Therapeutic Exercise Minutes 40 45 46 39   Neuro Re-Educ Minutes    6   Manual Therapy Minutes 10      Gait Training Minutes    5   Total Time Of Timed Procedures 50 45 46 50   Total Time Of  Service-Based Procedures 0 0 0 0   Total Treatment Time 50 45 46 50        HEP  Continue current HEP    Charges      Ex 3

## 2025-07-08 ENCOUNTER — OFFICE VISIT (OUTPATIENT)
Dept: PHYSICAL THERAPY | Age: 65
End: 2025-07-08
Attending: INTERNAL MEDICINE
Payer: COMMERCIAL

## 2025-07-08 PROCEDURE — 97110 THERAPEUTIC EXERCISES: CPT

## 2025-07-08 NOTE — PROGRESS NOTES
Patient: Pedro Vieyra (65 year old, female) Referring Provider:  Insurance:   Diagnosis: R Total Knee arthroplasty No ref. provider found  [employer] [place]   Date of Surgery: 3/19/2025 Next MD visit:  N/A   Precautions:  None No data recorded Referral Information:    Date of Evaluation: Req: 0, Auth: 0, Exp:     04/01/25 POC Auth Visits:  30       Today's Date   7/8/2025    Subjective  Patient reports her knee is less swollen today.       Pain: 0/10     Objective  (R) knee ROM: -6 deg to 75 deg            Assessment  Patient displaying improved knee extension ROM and overall quad strength noted during straight leg raises and when on leg press machine.        Goals: (to be met in 12 visits)     Not Met Progress Toward Partially Met Met   Patient will be independent with their home program, its progression, and management of residual symptoms. []  [x]  []  []    Patient will report pain of not more than 1/10 during activity. []  [x]  []  []    Patient will improve R knee ROM to WFL into all planes to improve LE mobility. []  [x]  []  []    Increase strength to 5/5 throughout to be able to perform previous activities without difficulty. []  [x]  []  []    Patient will verbalize and demonstrate strategies to improve posture and body mechanics during activity. []  [x]  []  []    Patient will resume all walking activities including going up and down steps without use of assistive device and normal gait pattern. []  [x]  []  []                                     Plan  continue PT to improve knee ROM and strength    Treatment Last 4 Visits  Treatment Day: 28       7/2/2025 7/3/2025 7/7/2025 7/8/2025   LE Treatment   Therapeutic Exercise - NuStep level 6 (UEs and LEs) x 8 minutes  - standing B gastroc stretch on slant board level 3 3 x 30 sec holds  - standing R forward step up to 4 inch step 2 x 10  - standing B knee flexion AAROM with SB 20x  - long sitting R QS 10x 10 sec holds  - supine R QS 10x 5 sec holds  - supine R SLR  10x  - supine R SLR with YTB above knees 2 x 10  - prone R TKE 2 x 10  - Shuttle B LE leg press 6 bands 10 x 3   - Shuttle single LE leg press 4 bands 10 x 3    - NuStep level 6 (UEs and LEs) x 8 minutes  - prone R bends 3# 2 x 10  - prone R hip extension 2 x 10   - prone R TKE 2 x 10 5 sec holds  - prone R knee flexion with strap 10x 10 sec holds  - supine R QS 2 x 10 5 sec holds  - supine R SLR with RTB above knees 2 x 10  - shuttle machine B Knee ext/flx 6 bands 3 x 10  - shuttle machine R knee ext/flx 4 bands 3 x 10  - R/L hip abduction/hip extension/hip flexion with GTB above knees 2 x 10 ea  - standing marching x 1 minute   - NuStep (UEs and LEs) level 5 minutes  - supine R heel slides 15x  - long sitting R QS 10x 10 sec holds  - supine R SLR with RTB above knees 1 x 10  - shuttle machine B knee ext/flx 6 bands 3 x 10 (eccentric emphasis- slow & controlled)  - shuttle machine R knee ext/flx 3-4 bands 1 x 10  (eccentric emphasis- slow & controlled)  - standing B gastroc stretch on slant board level 3 3 x 30 sec holds   - NuStep (UEs and LEs) level 5 x 8 minutes  - standing R knee flexion stretch on stair   - standing R hamstring curls 3# 2 x 10  - standing R forward step ups to 6 inch step 2 x 10  - standing R lateral step ups to 6 inch 3 x 10  - seated R LAQs 3# 2 x 10  - standing B heel raises 10x  - standing R heel raises 10x  - prone R knee bends 2 x 10  - prone R knee flexion with clinician assist 10x 5-10 sec holds  - supine R SLR with RTB above knees 3 x 10  - shuttle machine R Knee ext/flx 3 bands 3 x 10   Neuro Re-Education   - standing marching x 1 minute  - sidestepping with RTB above knees 4 x 60 feet         Gait Training   - gait training without straight cane  - gait training - retro walking with emphasis on promoting TKE    Therapeutic Exercise Minutes 45 46 39 60   Neuro Re-Educ Minutes   6    Gait Training Minutes   5    Total Time Of Timed Procedures 45 46 50 60   Total Time Of Service-Based  Procedures 0 0 0 0   Total Treatment Time 45 46 50 60        HEP  Continue current HEP    Charges        Ex 4

## 2025-07-11 ENCOUNTER — OFFICE VISIT (OUTPATIENT)
Dept: PHYSICAL THERAPY | Age: 65
End: 2025-07-11
Attending: INTERNAL MEDICINE
Payer: COMMERCIAL

## 2025-07-11 PROCEDURE — 97110 THERAPEUTIC EXERCISES: CPT

## 2025-07-11 NOTE — PROGRESS NOTES
Patient: Pedro Vieyra (65 year old, female) Referring Provider:  Insurance:   Diagnosis: R Total Knee arthroplasty No ref. provider found  [employer] [place]   Date of Surgery: 3/19/2025 Next MD visit:  N/A   Precautions:  None No data recorded Referral Information:    Date of Evaluation: Req: 0, Auth: 0, Exp:     04/01/25 POC Auth Visits:  30       Today's Date   7/11/2025    Subjective  Patient reports regular compliance       Pain: 0/10     Objective          Knee ROM (R): - 5 deg to 82 deg       Assessment  Patient displaying improved gait pattern, improved R knee joint mobility noted with active and passive stretching, and improved quad strength.  Demos improved knee ROM with reassessment.       Goals: (to be met in 12 visits)     Not Met Progress Toward Partially Met Met   Patient will be independent with their home program, its progression, and management of residual symptoms. []  [x]  []  []    Patient will report pain of not more than 1/10 during activity. []  [x]  []  []    Patient will improve R knee ROM to WFL into all planes to improve LE mobility. []  [x]  []  []    Increase strength to 5/5 throughout to be able to perform previous activities without difficulty. []  [x]  []  []    Patient will verbalize and demonstrate strategies to improve posture and body mechanics during activity. []  [x]  []  []    Patient will resume all walking activities including going up and down steps without use of assistive device and normal gait pattern. []  [x]  []  []                                     Plan  continue PT to improve knee ROM and strength    Treatment Last 4 Visits  Treatment Day: 29       7/3/2025 7/7/2025 7/8/2025 7/11/2025   LE Treatment   Therapeutic Exercise - NuStep level 6 (UEs and LEs) x 8 minutes  - prone R bends 3# 2 x 10  - prone R hip extension 2 x 10   - prone R TKE 2 x 10 5 sec holds  - prone R knee flexion with strap 10x 10 sec holds  - supine R QS 2 x 10 5 sec holds  - supine R SLR with RTB  above knees 2 x 10  - shuttle machine B Knee ext/flx 6 bands 3 x 10  - shuttle machine R knee ext/flx 4 bands 3 x 10  - R/L hip abduction/hip extension/hip flexion with GTB above knees 2 x 10 ea  - standing marching x 1 minute   - NuStep (UEs and LEs) level 5 minutes  - supine R heel slides 15x  - long sitting R QS 10x 10 sec holds  - supine R SLR with RTB above knees 1 x 10  - shuttle machine B knee ext/flx 6 bands 3 x 10 (eccentric emphasis- slow & controlled)  - shuttle machine R knee ext/flx 3-4 bands 1 x 10  (eccentric emphasis- slow & controlled)  - standing B gastroc stretch on slant board level 3 3 x 30 sec holds   - NuStep (UEs and LEs) level 5 x 8 minutes  - standing R knee flexion stretch on stair   - standing R hamstring curls 3# 2 x 10  - standing R forward step ups to 6 inch step 2 x 10  - standing R lateral step ups to 6 inch 3 x 10  - seated R LAQs 3# 2 x 10  - standing B heel raises 10x  - standing R heel raises 10x  - prone R knee bends 2 x 10  - prone R knee flexion with clinician assist 10x 5-10 sec holds  - supine R SLR with RTB above knees 3 x 10  - shuttle machine R Knee ext/flx 3 bands 3 x 10 - NuStep level 6 (UEs and LEs) x 8 minutes  - standing R/L hip abduction/hip extension with Blue Tband above knees 2 x 10 ea  - standing R hip flexion with Blue Tband above knees 2 x 10 ea  - sidestepping with GTB at shins 4 x 30 feet  - shuttle machine B knee ext/flx 6 bands 3 x 10  - shuttle machine R knee ext/flx 3-4 bands 3 x 10  - seated R knee flexion with 0# 2 x 10  - supine R heel slides 10x  - prone R knee flexion PROM by clinician 5x 10 sec holds  - reassessment   Neuro Re-Education  - standing marching x 1 minute  - sidestepping with RTB above knees 4 x 60 feet          Gait Training  - gait training without straight cane  - gait training - retro walking with emphasis on promoting TKE     Therapeutic Exercise Minutes 46 39 60 45   Neuro Re-Educ Minutes  6     Gait Training Minutes  5     Total  Time Of Timed Procedures 46 50 60 45   Total Time Of Service-Based Procedures 0 0 0 0   Total Treatment Time 46 50 60 45        HEP  Continue current HEP; provided Blue Tband for home    Charges   Ex 3

## 2025-07-15 ENCOUNTER — OFFICE VISIT (OUTPATIENT)
Dept: PHYSICAL THERAPY | Age: 65
End: 2025-07-15
Attending: INTERNAL MEDICINE
Payer: COMMERCIAL

## 2025-07-15 PROCEDURE — 97110 THERAPEUTIC EXERCISES: CPT

## 2025-07-15 NOTE — PROGRESS NOTES
Patient: Pedro Vieyra (65 year old, female) Referring Provider:  Insurance:   Diagnosis: R Total Knee arthroplasty No ref. provider found  [employer] [place]   Date of Surgery: 3/19/2025 Next MD visit:  N/A   Precautions:  None No data recorded Referral Information:    Date of Evaluation: Req: 0, Auth: 0, Exp:     04/01/25 POC Auth Visits:  30       Today's Date   7/15/2025    Subjective  Patient reports regular compliance with her home exercises.  Patient states she saw her MD who stated she is doing well.  She states she will return to MD in six weeks.       Pain: 2/10     Objective          See tx log below       Assessment  Advanced resistance and reps on leg press machine and resistance with straight leg raise exercise.  Patient demos improved RLE strength observed during performance of exercises.         Goals: (to be met in 12 visits)     Not Met Progress Toward Partially Met Met   Patient will be independent with their home program, its progression, and management of residual symptoms. []  [x]  []  []    Patient will report pain of not more than 1/10 during activity. []  [x]  []  []    Patient will improve R knee ROM to WFL into all planes to improve LE mobility. []  [x]  []  []    Increase strength to 5/5 throughout to be able to perform previous activities without difficulty. []  [x]  []  []    Patient will verbalize and demonstrate strategies to improve posture and body mechanics during activity. []  [x]  []  []    Patient will resume all walking activities including going up and down steps without use of assistive device and normal gait pattern. []  [x]  []  []                                     Plan  continue PT to improve knee ROM and strength    Treatment Last 4 Visits  Treatment Day: 30       7/7/2025 7/8/2025 7/11/2025 7/15/2025   LE Treatment   Therapeutic Exercise - NuStep (UEs and LEs) level 5 minutes  - supine R heel slides 15x  - long sitting R QS 10x 10 sec holds  - supine R SLR with RTB above  knees 1 x 10  - shuttle machine B knee ext/flx 6 bands 3 x 10 (eccentric emphasis- slow & controlled)  - shuttle machine R knee ext/flx 3-4 bands 1 x 10  (eccentric emphasis- slow & controlled)  - standing B gastroc stretch on slant board level 3 3 x 30 sec holds   - NuStep (UEs and LEs) level 5 x 8 minutes  - standing R knee flexion stretch on stair   - standing R hamstring curls 3# 2 x 10  - standing R forward step ups to 6 inch step 2 x 10  - standing R lateral step ups to 6 inch 3 x 10  - seated R LAQs 3# 2 x 10  - standing B heel raises 10x  - standing R heel raises 10x  - prone R knee bends 2 x 10  - prone R knee flexion with clinician assist 10x 5-10 sec holds  - supine R SLR with RTB above knees 3 x 10  - shuttle machine R Knee ext/flx 3 bands 3 x 10 - NuStep level 6 (UEs and LEs) x 8 minutes  - standing R/L hip abduction/hip extension with Blue Tband above knees 2 x 10 ea  - standing R hip flexion with Blue Tband above knees 2 x 10 ea  - sidestepping with GTB at shins 4 x 30 feet  - shuttle machine B knee ext/flx 6 bands 3 x 10  - shuttle machine R knee ext/flx 3-4 bands 3 x 10  - seated R knee flexion with 0# 2 x 10  - supine R heel slides 10x  - prone R knee flexion PROM by clinician 5x 10 sec holds  - reassessment - standing R knee flexion/extension at stair 10x   - standing B heel raises 2 x 10  - standing B gastroc stretch on slant board level 4 3 x 30 sec holds  - prone R knee flexion 3# 3 x 10  - prone R knee flexion with strap 10x 10 sec holds  - prone R/L hip extension 2 x 10 ea  - supine R SLR with GTB above knees 2 x 10  - sidelying R/L clams with GTB above knees 2 x 10 ea  - shuttle machine B knee ext/flx 5 bands 3 x 12  - shuttle machine R knee ext/flx 3 x 10   Neuro Re-Education - standing marching x 1 minute  - sidestepping with RTB above knees 4 x 60 feet           Gait Training - gait training without straight cane  - gait training - retro walking with emphasis on promoting TKE       Therapeutic Exercise Minutes 39 60 45 50   Neuro Re-Educ Minutes 6      Gait Training Minutes 5      Total Time Of Timed Procedures 50 60 45 50   Total Time Of Service-Based Procedures 0 0 0 0   Total Treatment Time 50 60 45 50        HEP  GTB provided for home use    Charges   Ex 3

## 2025-07-17 ENCOUNTER — OFFICE VISIT (OUTPATIENT)
Dept: PHYSICAL THERAPY | Age: 65
End: 2025-07-17
Attending: INTERNAL MEDICINE
Payer: COMMERCIAL

## 2025-07-17 PROCEDURE — 97110 THERAPEUTIC EXERCISES: CPT

## 2025-07-17 NOTE — PROGRESS NOTES
Patient: Pedro Vieyra (65 year old, female) Referring Provider:  Insurance:   Diagnosis: R Total Knee arthroplasty No ref. provider found  [employer] [place]   Date of Surgery: 3/19/2025 Next MD visit:  N/A   Precautions:  None No data recorded Referral Information:    Date of Evaluation: Req: 0, Auth: 0, Exp:     04/01/25 POC Auth Visits:  40       Today's Date   7/17/2025    Subjective  Patient reports she is walking a lot better.  She reports she is performing all her exercises at home.  Patient reports she woke up in the morning from sleep due to knee pain.       Pain: 3/10     Objective          See tx log below       Assessment  Patient demos improved gait pattern with reduced limp and deviations.  She was able to perform increased resistance and reps on shuttle machine.         Goals: (to be met in 12 visits)     Not Met Progress Toward Partially Met Met   Patient will be independent with their home program, its progression, and management of residual symptoms. []  [x]  []  []    Patient will report pain of not more than 1/10 during activity. []  [x]  []  []    Patient will improve R knee ROM to WFL into all planes to improve LE mobility. []  [x]  []  []    Increase strength to 5/5 throughout to be able to perform previous activities without difficulty. []  [x]  []  []    Patient will verbalize and demonstrate strategies to improve posture and body mechanics during activity. []  [x]  []  []    Patient will resume all walking activities including going up and down steps without use of assistive device and normal gait pattern. []  [x]  []  []                                     Plan  continue PT to improve knee ROM and strength    Treatment Last 4 Visits  Treatment Day: 31       7/8/2025 7/11/2025 7/15/2025 7/17/2025   LE Treatment   Therapeutic Exercise - NuStep (UEs and LEs) level 5 x 8 minutes  - standing R knee flexion stretch on stair   - standing R hamstring curls 3# 2 x 10  - standing R forward step ups to 6  inch step 2 x 10  - standing R lateral step ups to 6 inch 3 x 10  - seated R LAQs 3# 2 x 10  - standing B heel raises 10x  - standing R heel raises 10x  - prone R knee bends 2 x 10  - prone R knee flexion with clinician assist 10x 5-10 sec holds  - supine R SLR with RTB above knees 3 x 10  - shuttle machine R Knee ext/flx 3 bands 3 x 10 - NuStep level 6 (UEs and LEs) x 8 minutes  - standing R/L hip abduction/hip extension with Blue Tband above knees 2 x 10 ea  - standing R hip flexion with Blue Tband above knees 2 x 10 ea  - sidestepping with GTB at shins 4 x 30 feet  - shuttle machine B knee ext/flx 6 bands 3 x 10  - shuttle machine R knee ext/flx 3-4 bands 3 x 10  - seated R knee flexion with 0# 2 x 10  - supine R heel slides 10x  - prone R knee flexion PROM by clinician 5x 10 sec holds  - reassessment - standing R knee flexion/extension at stair 10x   - standing B heel raises 2 x 10  - standing B gastroc stretch on slant board level 4 3 x 30 sec holds  - prone R knee flexion 3# 3 x 10  - prone R knee flexion with strap 10x 10 sec holds  - prone R/L hip extension 2 x 10 ea  - supine R SLR with GTB above knees 2 x 10  - sidelying R/L clams with GTB above knees 2 x 10 ea  - shuttle machine B knee ext/flx 5 bands 3 x 12  - shuttle machine R knee ext/flx 3 x 10 - NuStep level 6 x 10 minutes  - standing R knee flexion/extension stretch at stairs 20x  - standing R/L forward lunges onto bosu 2 x 10 ea  - shuttle machine B knee ext/flx 6 bands 3 x 15 reps  - shuttle machine R/L knee ext/flx 4 bands 2 x 15  - standing B gastroc stretch on slant board level 3 3 x 30 sec holds  - supine R SLR with 1.5# 3 x 10  - sidelying R hip abduction 0# 2 x 10   Therapeutic Exercise Minutes 60 45 50 55   Total Time Of Timed Procedures 60 45 50 55   Total Time Of Service-Based Procedures 0 0 0 0   Total Treatment Time 60 45 50 55        HEP  Continue current HEP    Charges        Ex 4

## 2025-07-18 ENCOUNTER — OFFICE VISIT (OUTPATIENT)
Dept: PHYSICAL THERAPY | Age: 65
End: 2025-07-18
Attending: INTERNAL MEDICINE
Payer: COMMERCIAL

## 2025-07-18 PROCEDURE — 97110 THERAPEUTIC EXERCISES: CPT

## 2025-07-18 NOTE — PROGRESS NOTES
Patient: Pedro Vieyra (65 year old, female) Referring Provider:  Insurance:   Diagnosis: R Total Knee arthroplasty No ref. provider found  [employer] [place]   Date of Surgery: 3/19/2025 Next MD visit:  N/A   Precautions:  None No data recorded Referral Information:    Date of Evaluation: Req: 0, Auth: 0, Exp:     04/01/25 POC Auth Visits:  40       Today's Date   7/18/2025    Subjective  Patient reports her knee is stiff this morning.       Pain: 3/10     Objective          See tx log below       Assessment  Initiated step down exercise today and knee flexion stretch on stool.         Goals: (to be met in 12 visits)     Not Met Progress Toward Partially Met Met   Patient will be independent with their home program, its progression, and management of residual symptoms. []  [x]  []  []    Patient will report pain of not more than 1/10 during activity. []  [x]  []  []    Patient will improve R knee ROM to WFL into all planes to improve LE mobility. []  [x]  []  []    Increase strength to 5/5 throughout to be able to perform previous activities without difficulty. []  [x]  []  []    Patient will verbalize and demonstrate strategies to improve posture and body mechanics during activity. []  [x]  []  []    Patient will resume all walking activities including going up and down steps without use of assistive device and normal gait pattern. []  [x]  []  []                                     Plan  continue PT to improve knee ROM and strength    Treatment Last 4 Visits  Treatment Day: 32       7/11/2025 7/15/2025 7/17/2025 7/18/2025   LE Treatment   Therapeutic Exercise - NuStep level 6 (UEs and LEs) x 8 minutes  - standing R/L hip abduction/hip extension with Blue Tband above knees 2 x 10 ea  - standing R hip flexion with Blue Tband above knees 2 x 10 ea  - sidestepping with GTB at shins 4 x 30 feet  - shuttle machine B knee ext/flx 6 bands 3 x 10  - shuttle machine R knee ext/flx 3-4 bands 3 x 10  - seated R knee flexion with  0# 2 x 10  - supine R heel slides 10x  - prone R knee flexion PROM by clinician 5x 10 sec holds  - reassessment - standing R knee flexion/extension at stair 10x   - standing B heel raises 2 x 10  - standing B gastroc stretch on slant board level 4 3 x 30 sec holds  - prone R knee flexion 3# 3 x 10  - prone R knee flexion with strap 10x 10 sec holds  - prone R/L hip extension 2 x 10 ea  - supine R SLR with GTB above knees 2 x 10  - sidelying R/L clams with GTB above knees 2 x 10 ea  - shuttle machine B knee ext/flx 5 bands 3 x 12  - shuttle machine R knee ext/flx 3 x 10 - NuStep level 6 x 10 minutes  - standing R knee flexion/extension stretch at stairs 20x  - standing R/L forward lunges onto bosu 2 x 10 ea  - shuttle machine B knee ext/flx 6 bands 3 x 15 reps  - shuttle machine R/L knee ext/flx 4 bands 2 x 15  - standing B gastroc stretch on slant board level 3 3 x 30 sec holds  - supine R SLR with 1.5# 3 x 10  - sidelying R hip abduction 0# 2 x 10 - NuStep (UEs and LEs) level 6  x 8 minutes  - seated R knee flexion stretch on stool 10 sec holds 20 reps  - standing B gastroc stretch on slant board level 3 3 x 30 sec holds  - shuttle machine B knee ext/flx 6 bands 3 x 15  - shuttle machine R/L knee ext/flx 4 bands 3 x 12  - standing R lateral step up to 6 inch step 2 x 10  - standing R step down from 2 inch step 2 x 10     Therapeutic Exercise Minutes 45 50 55 43   Total Time Of Timed Procedures 45 50 55 43   Total Time Of Service-Based Procedures 0 0 0 0   Total Treatment Time 45 50 55 43        HEP  GTB provided for home use    Charges        Ex 3

## 2025-07-22 ENCOUNTER — OFFICE VISIT (OUTPATIENT)
Dept: PHYSICAL THERAPY | Age: 65
End: 2025-07-22
Attending: INTERNAL MEDICINE
Payer: COMMERCIAL

## 2025-07-22 PROCEDURE — 97110 THERAPEUTIC EXERCISES: CPT

## 2025-07-22 NOTE — PROGRESS NOTES
Patient: Pedro Vieyra (65 year old, female) Referring Provider:  Insurance:   Diagnosis: R Total Knee arthroplasty No ref. provider found  [employer] [place]   Date of Surgery: 3/19/2025 Next MD visit:  N/A   Precautions:  None No data recorded Referral Information:    Date of Evaluation: Req: 0, Auth: 0, Exp:     04/01/25 POC Auth Visits:  40       Today's Date   7/22/2025    Subjective  Patient reports her knee is very sore today.       Pain: 4/10     Objective          See tx log below       Assessment  Patient displaying improved gait pattern with reduced limp post session.         Goals: (to be met in 12 visits)     Not Met Progress Toward Partially Met Met   Patient will be independent with their home program, its progression, and management of residual symptoms. []  [x]  []  []    Patient will report pain of not more than 1/10 during activity. []  [x]  []  []    Patient will improve R knee ROM to WFL into all planes to improve LE mobility. []  [x]  []  []    Increase strength to 5/5 throughout to be able to perform previous activities without difficulty. []  [x]  []  []    Patient will verbalize and demonstrate strategies to improve posture and body mechanics during activity. []  [x]  []  []    Patient will resume all walking activities including going up and down steps without use of assistive device and normal gait pattern. []  [x]  []  []                                     Plan  continue PT to improve knee ROM and strength    Treatment Last 4 Visits  Treatment Day: 33       7/15/2025 7/17/2025 7/18/2025 7/22/2025   LE Treatment   Therapeutic Exercise - standing R knee flexion/extension at stair 10x   - standing B heel raises 2 x 10  - standing B gastroc stretch on slant board level 4 3 x 30 sec holds  - prone R knee flexion 3# 3 x 10  - prone R knee flexion with strap 10x 10 sec holds  - prone R/L hip extension 2 x 10 ea  - supine R SLR with GTB above knees 2 x 10  - sidelying R/L clams with GTB above knees 2  x 10 ea  - shuttle machine B knee ext/flx 5 bands 3 x 12  - shuttle machine R knee ext/flx 3 x 10 - NuStep level 6 x 10 minutes  - standing R knee flexion/extension stretch at stairs 20x  - standing R/L forward lunges onto bosu 2 x 10 ea  - shuttle machine B knee ext/flx 6 bands 3 x 15 reps  - shuttle machine R/L knee ext/flx 4 bands 2 x 15  - standing B gastroc stretch on slant board level 3 3 x 30 sec holds  - supine R SLR with 1.5# 3 x 10  - sidelying R hip abduction 0# 2 x 10 - NuStep (UEs and LEs) level 6  x 8 minutes  - seated R knee flexion stretch on stool 10 sec holds 20 reps  - standing B gastroc stretch on slant board level 3 3 x 30 sec holds  - shuttle machine B knee ext/flx 6 bands 3 x 15  - shuttle machine R/L knee ext/flx 4 bands 3 x 12  - standing R lateral step up to 6 inch step 2 x 10  - standing R step down from 2 inch step 2 x 10   - NuStep level 5 (UEs and LEs) x 10 minutes  - prone R knee flexion 3# 3 x 10  - prone R knee flexion stretch with strap 10 sec holds 10x  - prone R TKE 2 x 11  - prone R/L hip extension 2 x 10 ea  - supine R SLR with GTB above knees 2 x 10  - supine R QS 10 sec holds 10x  - shuttle machine B knee ext/flx 5 bands 3 x 10  - shuttle machine R knee ext/flx 4 bands 3 x 10     Therapeutic Exercise Minutes 50 55 43 54   Total Time Of Timed Procedures 50 55 43 54   Total Time Of Service-Based Procedures 0 0 0 0   Total Treatment Time 50 55 43 54        HEP  Continue current HEP    Charges        Ex 4

## 2025-07-23 ENCOUNTER — OFFICE VISIT (OUTPATIENT)
Dept: PHYSICAL THERAPY | Age: 65
End: 2025-07-23
Attending: INTERNAL MEDICINE
Payer: COMMERCIAL

## 2025-07-23 PROCEDURE — 97110 THERAPEUTIC EXERCISES: CPT

## 2025-07-23 NOTE — PROGRESS NOTES
Patient: Pedro Vieyra (65 year old, female) Referring Provider:  Insurance:   Diagnosis: R Total Knee arthroplasty No ref. provider found  [place]   Date of Surgery: 3/19/2025 Next MD visit:  N/A   Precautions:  None No data recorded Referral Information:    Date of Evaluation: Req: 0, Auth: 0, Exp:     04/01/25 POC Auth Visits:  40       Today's Date   7/23/2025    Subjective  Patient reports her knee is feeling stiff.       Pain: pain not reported     Objective          7/23/2025:  Significant global R knee edema observed       Assessment  Patient displaying improved quad strength and improved gait pattern observed in clinic.           Goals: (to be met in 12 visits)     Not Met Progress Toward Partially Met Met   Patient will be independent with their home program, its progression, and management of residual symptoms. []  [x]  []  []    Patient will report pain of not more than 1/10 during activity. []  [x]  []  []    Patient will improve R knee ROM to WFL into all planes to improve LE mobility. []  [x]  []  []    Increase strength to 5/5 throughout to be able to perform previous activities without difficulty. []  [x]  []  []    Patient will verbalize and demonstrate strategies to improve posture and body mechanics during activity. []  [x]  []  []    Patient will resume all walking activities including going up and down steps without use of assistive device and normal gait pattern. []  [x]  []  []                                     Plan  continue PT to improve knee ROM and strength    Treatment Last 4 Visits  Treatment Day: 34       7/17/2025 7/18/2025 7/22/2025 7/23/2025   LE Treatment   Therapeutic Exercise - NuStep level 6 x 10 minutes  - standing R knee flexion/extension stretch at stairs 20x  - standing R/L forward lunges onto bosu 2 x 10 ea  - shuttle machine B knee ext/flx 6 bands 3 x 15 reps  - shuttle machine R/L knee ext/flx 4 bands 2 x 15  - standing B gastroc stretch on slant board level 3 3 x  30 sec holds  - supine R SLR with 1.5# 3 x 10  - sidelying R hip abduction 0# 2 x 10 - NuStep (UEs and LEs) level 6  x 8 minutes  - seated R knee flexion stretch on stool 10 sec holds 20 reps  - standing B gastroc stretch on slant board level 3 3 x 30 sec holds  - shuttle machine B knee ext/flx 6 bands 3 x 15  - shuttle machine R/L knee ext/flx 4 bands 3 x 12  - standing R lateral step up to 6 inch step 2 x 10  - standing R step down from 2 inch step 2 x 10   - NuStep level 5 (UEs and LEs) x 10 minutes  - prone R knee flexion 3# 3 x 10  - prone R knee flexion stretch with strap 10 sec holds 10x  - prone R TKE 2 x 11  - prone R/L hip extension 2 x 10 ea  - supine R SLR with GTB above knees 2 x 10  - supine R QS 10 sec holds 10x  - shuttle machine B knee ext/flx 5 bands 3 x 10  - shuttle machine R knee ext/flx 4 bands 3 x 10   - NuStep level 6 (UEs and LEs) x 10 minutes  - standing B gastroc stretch on slant board level 3 3 x 30 sec holds   - R SLS on 4 inch step with L step over and back 2 x 1 minute  - lateral step overs on 6 inch step 1 minute x 2 reps  - shuttle machine B knee ext/flx 6 bands 3 x 10  - shuttle machine R knee ext/flx 4 bands 3 x 10  - supine R SLR with 2.5# 2 x 10  - supine R heel slides 2 x 10  - sidestepping with GTB above knees 6 x 25 feet   Therapeutic Exercise Minutes 55 43 54 46   Total Time Of Timed Procedures 55 43 54 46   Total Time Of Service-Based Procedures 0 0 0 0   Total Treatment Time 55 43 54 46        HEP  Continue current HEP; encouraged ice and elevation    Charges   Ex 3

## 2025-07-24 ENCOUNTER — OFFICE VISIT (OUTPATIENT)
Dept: PHYSICAL THERAPY | Age: 65
End: 2025-07-24
Attending: INTERNAL MEDICINE
Payer: COMMERCIAL

## 2025-07-24 PROCEDURE — 97110 THERAPEUTIC EXERCISES: CPT

## 2025-07-24 PROCEDURE — 97112 NEUROMUSCULAR REEDUCATION: CPT

## 2025-07-24 NOTE — PROGRESS NOTES
Patient: Pedro Vieyra (65 year old, female) Referring Provider:  Insurance:   Diagnosis: R Total Knee arthroplasty No ref. provider found  [employer]   Date of Surgery: 3/19/2025 Next MD visit:  N/A   Precautions:  None No data recorded Referral Information:    Date of Evaluation: Req: 0, Auth: 0, Exp:     04/01/25 POC Auth Visits:  40       Today's Date   7/24/2025    Subjective  Patient reports her knee is feeling better.  She states she is walking without her cane and her knee is feeling more normal.       Pain: 0/10     Objective          See tx log below       Assessment  Progressed hamstring strengthening via seated knee flexion exercise this session.       Goals: (to be met in 12 visits)     Not Met Progress Toward Partially Met Met   Patient will be independent with their home program, its progression, and management of residual symptoms. []  [x]  []  []    Patient will report pain of not more than 1/10 during activity. []  [x]  []  []    Patient will improve R knee ROM to WFL into all planes to improve LE mobility. []  [x]  []  []    Increase strength to 5/5 throughout to be able to perform previous activities without difficulty. []  [x]  []  []    Patient will verbalize and demonstrate strategies to improve posture and body mechanics during activity. []  [x]  []  []    Patient will resume all walking activities including going up and down steps without use of assistive device and normal gait pattern. []  [x]  []  []                                     Plan  continue PT to improve knee ROM and strength    Treatment Last 4 Visits  Treatment Day: 35       7/18/2025 7/22/2025 7/23/2025 7/24/2025   LE Treatment   Therapeutic Exercise - NuStep (UEs and LEs) level 6  x 8 minutes  - seated R knee flexion stretch on stool 10 sec holds 20 reps  - standing B gastroc stretch on slant board level 3 3 x 30 sec holds  - shuttle machine B knee ext/flx 6 bands 3 x 15  - shuttle machine R/L knee ext/flx 4 bands 3 x 12  -  standing R lateral step up to 6 inch step 2 x 10  - standing R step down from 2 inch step 2 x 10   - NuStep level 5 (UEs and LEs) x 10 minutes  - prone R knee flexion 3# 3 x 10  - prone R knee flexion stretch with strap 10 sec holds 10x  - prone R TKE 2 x 11  - prone R/L hip extension 2 x 10 ea  - supine R SLR with GTB above knees 2 x 10  - supine R QS 10 sec holds 10x  - shuttle machine B knee ext/flx 5 bands 3 x 10  - shuttle machine R knee ext/flx 4 bands 3 x 10   - NuStep level 6 (UEs and LEs) x 10 minutes  - standing B gastroc stretch on slant board level 3 3 x 30 sec holds   - R SLS on 4 inch step with L step over and back 2 x 1 minute  - lateral step overs on 6 inch step 1 minute x 2 reps  - shuttle machine B knee ext/flx 6 bands 3 x 10  - shuttle machine R knee ext/flx 4 bands 3 x 10  - supine R SLR with 2.5# 2 x 10  - supine R heel slides 2 x 10  - sidestepping with GTB above knees 6 x 25 feet - NuStep level 5 (UEs and LEs) x 10 minutes  - standing B gastroc stretch on slant board level 3 3 x 30 sec holds  - shuttle machine B knee ext/flx 6 bands 4 x 10 reps  - shuttle machine R knee ext/flx 4 bands 4 x 10  - seated R knee flexion 0# 2 x 10  - R SLS on 4 inch step with L step over and back 3 x 10  - standing B heel raises 2 x 10   Neuro Re-Education    - standing on rocker board AP, ML taps x 1 minute ea  - standing R/L hip abduction and extension with GTB at ankles 2 x 10 ea  - standing R hip flexion with GTB at ankles 2 x 10   Therapeutic Exercise Minutes 43 54 46 40   Neuro Re-Educ Minutes    8   Total Time Of Timed Procedures 43 54 46 48   Total Time Of Service-Based Procedures 0 0 0 0   Total Treatment Time 43 54 46 48        HEP  Continue current HEP    Charges   Ex 3 NM 1

## 2025-07-30 ENCOUNTER — OFFICE VISIT (OUTPATIENT)
Dept: PHYSICAL THERAPY | Age: 65
End: 2025-07-30
Attending: INTERNAL MEDICINE
Payer: COMMERCIAL

## 2025-07-30 PROCEDURE — 97110 THERAPEUTIC EXERCISES: CPT

## 2025-07-31 ENCOUNTER — OFFICE VISIT (OUTPATIENT)
Dept: PHYSICAL THERAPY | Age: 65
End: 2025-07-31
Attending: INTERNAL MEDICINE
Payer: COMMERCIAL

## 2025-07-31 PROCEDURE — 97110 THERAPEUTIC EXERCISES: CPT

## 2025-08-04 RX ORDER — ROSUVASTATIN CALCIUM 20 MG/1
20 TABLET, COATED ORAL NIGHTLY
Qty: 90 TABLET | Refills: 3 | Status: SHIPPED | OUTPATIENT
Start: 2025-08-04

## 2025-08-07 ENCOUNTER — OFFICE VISIT (OUTPATIENT)
Dept: PHYSICAL THERAPY | Age: 65
End: 2025-08-07
Attending: INTERNAL MEDICINE

## 2025-08-07 PROCEDURE — 97110 THERAPEUTIC EXERCISES: CPT

## 2025-08-08 ENCOUNTER — OFFICE VISIT (OUTPATIENT)
Dept: PHYSICAL THERAPY | Age: 65
End: 2025-08-08
Attending: INTERNAL MEDICINE

## 2025-08-08 PROCEDURE — 97110 THERAPEUTIC EXERCISES: CPT

## 2025-08-12 ENCOUNTER — OFFICE VISIT (OUTPATIENT)
Dept: PHYSICAL THERAPY | Age: 65
End: 2025-08-12
Attending: INTERNAL MEDICINE

## 2025-08-12 PROCEDURE — 97110 THERAPEUTIC EXERCISES: CPT

## 2025-08-13 ENCOUNTER — OFFICE VISIT (OUTPATIENT)
Dept: OBGYN CLINIC | Facility: CLINIC | Age: 65
End: 2025-08-13

## 2025-08-13 VITALS
SYSTOLIC BLOOD PRESSURE: 106 MMHG | DIASTOLIC BLOOD PRESSURE: 66 MMHG | HEART RATE: 84 BPM | WEIGHT: 203 LBS | BODY MASS INDEX: 36 KG/M2

## 2025-08-13 DIAGNOSIS — Z01.419 ENCOUNTER FOR GYNECOLOGICAL EXAMINATION WITHOUT ABNORMAL FINDING: Primary | ICD-10-CM

## 2025-08-13 DIAGNOSIS — R92.8 ABNORMAL MAMMOGRAM: ICD-10-CM

## 2025-08-13 DIAGNOSIS — Z12.4 SCREENING FOR CERVICAL CANCER: ICD-10-CM

## 2025-08-13 PROCEDURE — 99396 PREV VISIT EST AGE 40-64: CPT | Performed by: OBSTETRICS & GYNECOLOGY

## 2025-08-13 PROCEDURE — 3074F SYST BP LT 130 MM HG: CPT | Performed by: OBSTETRICS & GYNECOLOGY

## 2025-08-13 PROCEDURE — 3078F DIAST BP <80 MM HG: CPT | Performed by: OBSTETRICS & GYNECOLOGY

## 2025-08-14 ENCOUNTER — OFFICE VISIT (OUTPATIENT)
Dept: PHYSICAL THERAPY | Age: 65
End: 2025-08-14
Attending: INTERNAL MEDICINE

## 2025-08-14 LAB — HPV E6+E7 MRNA CVX QL NAA+PROBE: NEGATIVE

## 2025-08-14 PROCEDURE — 97110 THERAPEUTIC EXERCISES: CPT

## 2025-08-19 ENCOUNTER — APPOINTMENT (OUTPATIENT)
Dept: PHYSICAL THERAPY | Age: 65
End: 2025-08-19
Attending: INTERNAL MEDICINE

## 2025-08-19 ENCOUNTER — OFFICE VISIT (OUTPATIENT)
Dept: PHYSICAL THERAPY | Age: 65
End: 2025-08-19
Attending: INTERNAL MEDICINE

## 2025-08-19 PROCEDURE — 97110 THERAPEUTIC EXERCISES: CPT | Performed by: PHYSICAL THERAPIST

## 2025-08-19 PROCEDURE — 97116 GAIT TRAINING THERAPY: CPT | Performed by: PHYSICAL THERAPIST

## 2025-08-21 ENCOUNTER — OFFICE VISIT (OUTPATIENT)
Dept: PHYSICAL THERAPY | Age: 65
End: 2025-08-21
Attending: PHYSICAL THERAPIST

## 2025-08-21 PROCEDURE — 97116 GAIT TRAINING THERAPY: CPT | Performed by: PHYSICAL THERAPIST

## 2025-08-21 PROCEDURE — 97110 THERAPEUTIC EXERCISES: CPT | Performed by: PHYSICAL THERAPIST

## (undated) DEVICE — SOL  .9 3000ML

## (undated) DEVICE — PIN FIX L80MM DIA3.2MM FLUT CAS ORTHOSOFT

## (undated) DEVICE — SHOULDER STABILIZATION KIT,                                    DISPOSABLE 12 PER BOX

## (undated) DEVICE — PIN DRL 75MM HDLSS TRCR NXGN

## (undated) DEVICE — LASSO POLYPECTOMY SNARE: Brand: LASSO

## (undated) DEVICE — SOLUTION IRRIG 3000ML 0.9% NACL FLX CONT

## (undated) DEVICE — SPECIALTY ARM SLING: Brand: DEROYAL

## (undated) DEVICE — PIN FIX 3.2X150MM FLUT CAS ORTHOSOFT

## (undated) DEVICE — SYRINGE, LUER SLIP, STERILE, 60ML: Brand: MEDLINE

## (undated) DEVICE — ENCORE® LATEX MICRO SIZE 8.5, STERILE LATEX POWDER-FREE SURGICAL GLOVE: Brand: ENCORE

## (undated) DEVICE — KIT NAVITRACKER KNEE AND SPINE DISP ORTHOSOFT

## (undated) DEVICE — SCREW ORTH 2.5X25MM FEM HEX PERSONA

## (undated) DEVICE — CHLORAPREP 26ML APPLICATOR

## (undated) DEVICE — CANNULA 5.75 CLEAR AR-6560

## (undated) DEVICE — SCREW FIX 3.5X48MM HEX HD

## (undated) DEVICE — NEEDLE SPINAL 18X3-1/2 PINK.

## (undated) DEVICE — PAD PERINL PST FOAM DISP FOR AMSCO SURG TBL

## (undated) DEVICE — MEDI-VAC NON-CONDUCTIVE SUCTION TUBING 6MM X 1.8M (6FT.) L: Brand: CARDINAL HEALTH

## (undated) DEVICE — V2 SPECIMEN COLLECTION MANIFOLD KIT: Brand: NEPTUNE

## (undated) DEVICE — BURR SHVR COOLCUT 13CM 4MM 8

## (undated) DEVICE — 3M™ MEDITPORE™ SOFT CLOTH TAPE 6 IN X 10 YD 12 ROLLS/CASE 2966: Brand: 3M™ MEDIPORE™

## (undated) DEVICE — BLADE SHVR COOLCUT 13CM 4MM

## (undated) DEVICE — DISPOSABLE TOURNIQUET CUFF SINGLE BLADDER, DUAL PORT AND QUICK CONNECT CONNECTOR: Brand: COLOR CUFF

## (undated) DEVICE — NEEDLE SCORPION AR-13995N

## (undated) DEVICE — POLAR CARE CUBE COOLING UNIT

## (undated) DEVICE — KIT CLEAN ENDOKIT 1.1OZ GOWNX2

## (undated) DEVICE — PAD THRP WRPON UNV PLRCR SHLDR

## (undated) DEVICE — T-MAX DISPOSABLE FACE MASK 8 PER BOX

## (undated) DEVICE — DRESSING SUR 9X25CM SIL SP CVR WTRPRF VIR

## (undated) DEVICE — DRAPE SRG 70X60IN SPLT U IMPRV

## (undated) DEVICE — SUT STRATAFIX SPRL PDS+ 3-0 12IN PS ABSRB VLT

## (undated) DEVICE — KIT ENDO ORCAPOD 160/180/190

## (undated) DEVICE — ANTIBACTERIAL UNDYED BRAIDED (POLYGLACTIN 910), SYNTHETIC ABSORBABLE SUTURE: Brand: COATED VICRYL

## (undated) DEVICE — 3M™ IOBAN™ 2 ANTIMICROBIAL INCISE DRAPE 6650EZ: Brand: IOBAN™ 2

## (undated) DEVICE — NEEDLE HPO 18GA 1.5IN ECLPS

## (undated) DEVICE — NEEDLE HYPO 18GA L1.5IN PNK SS PVT SHLD BVL

## (undated) DEVICE — 60 ML SYRINGE LUER-LOCK TIP: Brand: MONOJECT

## (undated) DEVICE — HOOD: Brand: FLYTE

## (undated) DEVICE — 3M™ STERI-STRIP™ REINFORCED ADHESIVE SKIN CLOSURES, R1547, 1/2 IN X 4 IN (12 MM X 100 MM), 6 STRIPS/ENVELOPE: Brand: 3M™ STERI-STRIP™

## (undated) DEVICE — SHEET,DRAPE,53X77,STERILE: Brand: MEDLINE

## (undated) DEVICE — Device: Brand: STABLECUT®

## (undated) DEVICE — SOLUTION IRRIG 1000ML 0.9% NACL USP BTL

## (undated) DEVICE — ADHESIVE SKIN TOP FOR WND CLSR DERMBND ADV

## (undated) DEVICE — WRAP COOLING KNEE W/ICE PILLOW

## (undated) DEVICE — SUT STRATAFIX SYMMTRC PDS+ 1 18IN CTX ABSRB V

## (undated) DEVICE — ENCORE® LATEX ACCLAIM SIZE 8.5, STERILE LATEX POWDER-FREE SURGICAL GLOVE: Brand: ENCORE

## (undated) DEVICE — 3 BONE CEMENT MIXER: Brand: MIXEVAC

## (undated) DEVICE — SHOULDER P.A.D II: Brand: DEROYAL

## (undated) DEVICE — MEDI-VAC NON-CONDUCTIVE SUCTION TUBING: Brand: CARDINAL HEALTH

## (undated) DEVICE — SUTURE MONOCRYL 4-0 Y845G

## (undated) DEVICE — APPLICATOR PREP 26ML CHG 2% ISO ALC 70%

## (undated) DEVICE — GAUZE SPONGES,12 PLY: Brand: CURITY

## (undated) DEVICE — TUBING IRR 16FT CNT WV 3 ASCP

## (undated) DEVICE — CO2 CANNULA,SSOFT,ADLT,7O2,4CO2,FEMALE: Brand: MEDLINE

## (undated) DEVICE — SUTURE PDS II 1 CT-1

## (undated) DEVICE — SHOULDER: Brand: MEDLINE INDUSTRIES, INC.

## (undated) DEVICE — SUT STRATAFIX SPRL MCRYL+ 2-0 18IN CT-1 ABSRB

## (undated) DEVICE — V2 SPECIMEN COLLECTION TRAY: Brand: NEPTUNE

## (undated) DEVICE — BLADE 11 SHRP BP SS SRG STRL

## (undated) DEVICE — GAMMEX® PI HYBRID SIZE 8.5, STERILE POWDER-FREE SURGICAL GLOVE, POLYISOPRENE AND NEOPRENE BLEND: Brand: GAMMEX

## (undated) DEVICE — PACK CDS TOTAL KNEE

## (undated) NOTE — MR AVS SNAPSHOT
[address]  [phone]               Thank you for choosing us for your health care visit with Layla Jefferson MD.  We are glad to serve you and happy to provide you with this summar [place] [address]   Phone:  [phone]   Fax:  [phone]    Diagnoses:  Hematochezia   Order:  Peg Buckley MD   [address]   [address] [facility] [address]   Phone:  [phone]   Fax:  [phone]       Comment:  Delaney Barfield [address], [address], [address], [address]     [phone]      [address]  [address], [address] [place] [address] Dorothy Said, I Today's Vital Signs     BP Pulse Temp Height Weight BMI    160/100 mmHg 79 98.4 °F (36.9 °C) (Tympanic) 5' 4\" (1.626 m) 245 lb (111.131 kg) 42.03 kg/m2         Current Medications          This list is accurate as of: 6/16/17  2:09 PM.  Always use your mo Make half your plate fruits and vegetables Highly refined, white starches including white bread, rice and pasta   Eat plenty of protein, keep the fat content low Sugars:  sodas and sports drinks, candies and desserts   Eat plenty of low-fat dairy products

## (undated) NOTE — LETTER
AUTHORIZATION FOR SURGICAL OPERATION OR OTHER PROCEDURE    1. I hereby authorize Dr. Greco/Urmila Duran PA-C, and [facility] staff assigned to my case to perform the following operation and/or procedure at the [facility] site:    Left shoulder cortisone injection   _______________________________________________________________________________________________      _______________________________________________________________________________________________    2.  My physician has explained the nature and purpose of the operation or other procedure, possible alternative methods of treatment, the risks involved, and the possibility of complication to me.  I acknowledge that no guarantee has been made as to the result that may be obtained.  3.  I recognize that, during the course of this operation, or other procedure, unforseen conditions may necessitate additional or different procedure than those listed above.  I, therefore, further authorize and request that the above named physician, his/her physician assistants or designees perform such procedures as are, in his/her professional opinion, necessary and desirable.  4.  Any tissue or organs removed in the operation or other procedure may be disposed of by and at the discretion of the [facility] and [facility].  5.  I understand that in the event of a medical emergency, I will be transported by local paramedics to [facility] or other hospital emergency department.  6.  I certify that I have read and fully understand the above consent to operation and/or other procedure.    7.  I acknowledge that my physician has explained sedation/analgesia administration to me including the risks and benefits.  I consent to the administration of sedation/analgesia as may be necessary or desirable in the judgement of my physician.    Witness signature: ___________________________________________________ Date:   ______/______/_____                    Time:  ________ A.M.  P.M.       Patient Name:  ______________________________________________________  (please print)      Patient signature:  ___________________________________________________             Relationship to Patient:           []  Parent    Responsible person                          []  Spouse  In case of minor or                    [] Other  _____________   Incompetent name:  __________________________________________________                               (please print)      _____________      Responsible person  In case of minor or  Incompetent signature:  _______________________________________________    Statement of Physician  My signature below affirms that prior to the time of the procedure, I have explained to the patient and/or his/her guardian, the risks and benefits involved in the proposed treatment and any reasonable alternative to the proposed treatment.  I have also explained the risks and benefits involved in the refusal of the proposed treatment and have answered the patient's questions.                        Date:  ______/______/_______  Provider                      Signature:  __________________________________________________________       Time:  ___________ A.M    P.M.

## (undated) NOTE — LETTER
11/4/2021              Mayvito Jacobsonye        [address] [facility] [address]         Dear Sil Rather,    I reviewed the pathology report from the biopsies done during your recent upper endoscopy.  Based on the report, you have NO evidence o

## (undated) NOTE — LETTER
Dear Dr. Philomena Pollard    This letter is to inform you that Shalonda Vides has been attending Physical Therapy with me. See below for my most recent plan of care.     Physical Therapy Progress Report    Diagnosis: Complete tear of right rotator cuff ( - standing pendulums 2# R shoulder AP, ML, CW/CCW 20x ea  - supine B shoulder flexion with wand 3x10  - supine R shoulder flexion with 1# 1x10  - supine R shoulder AROM 1x10  - supine R shoulder rhythmic stabilization with 1# AP, ML, CW/CCW 25x ea  - sidel - supine R shoulder flexion rhythmic stabilization AP, ML 20x  - supine R chest press 1# 2x10  - sidelying R shoulder ER AROM 3x10  - sidelying R shoulder abduction 2x12  - prone scap rows 1# 2x10  - prone shoulder extension 1# 2x10  - seated B shoulder fl - supine R shoulder ER/IR isometrics with AAS 5\" holds 2 x 10 each  - sidelying R shoulder ER AROM 2 x 10  - standing R shoulder ER/IR with AAS with towel and YTB 1 x 10 each  - standing B shoulder extension with [address] 1 x 10   - standing B narrow high row - supine R shoulder ER with AAROM 3x10  - supine B shoulder flexion AROM 2x10  - supine B shoulder ER AROM 2x10  - prone R shoulder extension 1x10  - prone R scap rows 1x10  - standing B shoulder extension with wand 2x10 - supine R PROM shoulder into flx, - cold pack to R shoulder at end of session 10 minutes - heat pad at start of session   To R shoulder 10 minutes  - cold pack to R shoulder at end of session 10 minutes - heat pad at start of session   To R shoulder 10 minutes  - cold pack to R shoulder at improved shoulder mechanics and stabilization with ADL such as lifting and reaching- IN PROGRESS  · Pt will be independent and compliant with comprehensive HEP to maintain progress achieved in PT - IN PROGRESS    Rehab Potential: good    Plan: Continue ski

## (undated) NOTE — LETTER
11/14/2022          To Whom It May Concern:    Sandra Sapp is currently under my medical care and may not return to work at this time. Please excuse Maytral for the next 3 weeks. We will see her back in the office, to re evaluate. If you require additional information please contact our office.         Sincerely,    Albertina Sanchez DPM

## (undated) NOTE — LETTER
06/12/20        Mayvito TERRY Vieyra  [address] [facility] [address]      Dear Alpa Leighite,    Our records indicate that you have outstanding lab work and or testing that was ordered for you and has not yet been completed:    H. Pylori, Stool    To provide

## (undated) NOTE — LETTER
10/31/2022          To Whom It May Concern:    Ashley Sparrow is currently under my medical care and may not return to work for the next 2 weeks. If you require additional information please contact our office.         Sincerely,    Jon Underwood DPM

## (undated) NOTE — LETTER
[facility]  Administration of Anesthesia  Pedro PRIETO agree to be cared for by a physician anesthesiologist alone and/or with a nurse anesthetist, who is specially trained to monitor me and give me medicine to put me to sleep or keep me comfortable during my procedure    I understand that my anesthesiologist and/or anesthetist is not an employee or agent of [facility] or [employer]. He or she works for [facility]    As the patient asking for anesthesia services, I agree to:  Allow the anesthesiologist (anesthesia doctor) to give me medicine and do additional procedures as necessary. Some examples are: Starting or using an “IV” to give me medicine, fluids or blood during my procedure, and having a breathing tube placed to help me breathe when I’m asleep (intubation). In the event that my heart stops working properly, I understand that my anesthesiologist will make every effort to sustain my life, unless otherwise directed by [facility] Do Not Resuscitate documents.  Tell my anesthesia doctor before my procedure:  If I am pregnant.  The last time that I ate or drank.  iii. All of the medicines I take (including prescriptions, herbal supplements, and pills I can buy without a prescription (including street drugs/illegal medications). Failure to inform my anesthesiologist about these medicines may increase my risk of anesthetic complications.  iv.If I am allergic to anything or have had a reaction to anesthesia before.  I understand how the anesthesia medicine will help me (benefits).  I understand that with any type of anesthesia medicine there are risks:  The most common risks are: nausea, vomiting, sore throat, muscle soreness, damage to my eyes, mouth, or teeth (from breathing tube placement).  Rare risks include: remembering what happened during my procedure, allergic reactions to medications, injury to my airway, heart, lungs, vision, nerves, or  muscles and in extremely rare instances death.  My doctor has explained to me other choices available to me for my care (alternatives).  Pregnant Patients (“epidural”):  I understand that the risks of having an epidural (medicine given into my back to help control pain during labor), include itching, low blood pressure, difficulty urinating, headache or slowing of the baby’s heart. Very rare risks include infection, bleeding, seizure, irregular heart rhythms and nerve injury.  Regional Anesthesia (“spinal”, “epidural”, & “nerve blocks”):  I understand that rare but potential complications include headache, bleeding, infection, seizure, irregular heart rhythms, and nerve injury.    _____________________________________________________________________________  Patient (or Representative) Signature/Relationship to Patient  Date   Time    _____________________________________________________________________________   Name (if used)    Language/Organization   Time    _____________________________________________________________________________  Nurse Anesthetist Signature     Date   Time  _____________________________________________________________________________  Anesthesiologist Signature     Date   Time  I have discussed the procedure and information above with the patient (or patient’s representative) and answered their questions. The patient or their representative has agreed to have anesthesia services.    _____________________________________________________________________________  Witness        Date   Time  I have verified that the signature is that of the patient or patient’s representative, and that it was signed before the procedure  Patient Name: Pedro Vieyra     : 1960                 Printed: 2024 at 9:06 AM    Medical Record #: [id number]                                            Page 1 of 1  ----------ANESTHESIA CONSENT----------

## (undated) NOTE — LETTER
AUTHORIZATION FOR SURGICAL OPERATION OR OTHER PROCEDURE    1. I hereby authorize Dr. Caballero, and [facility] staff assigned to my case to perform the following operation and/or procedure at the [facility]:    __________________Right shoulder cortisone injection______________________________      _______________________________________________________________________________________________    2.  My physician has explained the nature and purpose of the operation or other procedure, possible alternative methods of treatment, the risks involved, and the possibility of complication to me.  I acknowledge that no guarantee has been made as to the result that may be obtained.  3.  I recognize that, during the course of this operation, or other procedure, unforseen conditions may necessitate additional or different procedure than those listed above.  I, therefore, further authorize and request that the above named physician, his/her physician assistants or designees perform such procedures as are, in his/her professional opinion, necessary and desirable.  4.  Any tissue or organs removed in the operation or other procedure may be disposed of by and at the discretion of the [facility] and [facility].  5.  I understand that in the event of a medical emergency, I will be transported by local paramedics to [facility] or other hospital emergency department.  6.  I certify that I have read and fully understand the above consent to operation and/or other procedure.    7.  I acknowledge that my physician has explained sedation/analgesia administration to me including the risks and benefits.  I consent to the administration of sedation/analgesia as may be necessary or desirable in the judgement of my physician.    Witness signature: ___________________________________________________ Date:  ______/______/_____                    Time:  ________ A.M.  P.M.       Patient Name:   ______________________________________________________  (please print)      Patient signature:  ___________________________________________________             Relationship to Patient:           []  Parent    Responsible person                          []  Spouse  In case of minor or                    [] Other  _____________   Incompetent name:  __________________________________________________                               (please print)      _____________      Responsible person  In case of minor or  Incompetent signature:  _______________________________________________    Statement of Physician  My signature below affirms that prior to the time of the procedure, I have explained to the patient and/or his/her guardian, the risks and benefits involved in the proposed treatment and any reasonable alternative to the proposed treatment.  I have also explained the risks and benefits involved in the refusal of the proposed treatment and have answered the patient's questions.                        Date:  ______/______/_______  Provider                      Signature:  __________________________________________________________       Time:  ___________ A.M    P.M.

## (undated) NOTE — LETTER
11/29/2022              Mayvito B Vieyra        [address]         [facility] [address]         To whom it may concern,    This is to certify that Ms Glynis Cooks is our patient. She needs to continue to off work until Dec 20, 2022 due to medical illness.       Sincerely,    Swapna Dodson MD  [place] , [address], [address] [place] [address]  [phone]        Document electronically generated by:  Swapna Dodson MD

## (undated) NOTE — LETTER
7/14/2017              Pedro Jacobsonye        [address],         [facility] [address]         Dear Fatoumata Romano,      It was a pleasure to see you. Your most recent Pap Smear and HPV were negative.  Please make sure you call to make an appointment f

## (undated) NOTE — LETTER
Dear Dr. Alona Cockayne    This letter is to inform you that Jesse Hill has been attending Physical Therapy with me. See below for my most recent plan of care.      Physical Therapy Progress Note    Diagnosis: Complete tear of right rotator cuff (M - standing R shoulder pendulums AP, ML, CW/CCW 30x ea   - supine R PROM shoulder motions x 30 minutes into flx/abd/ER (per protocol)  - standing R shoulder pendulums AP, ML, CW/CCW 30x ea  - supine R PROM R elbow flx/ext 10x  - supine R wrist flx/ext 30x

## (undated) NOTE — LETTER
12/11/2018          To Whom It May Concern:    Yolis Jaquez is currently under my medical care. Please excuse her from work for 3 weeks. If you require additional information please contact our office.         Sincerely,    Aldair So MD

## (undated) NOTE — LETTER
[facility]  [address], [place], [place]    Authorization for Surgical Operation and Procedure                               I hereby authorize KIEL La MD, my physician and his/her assistants (if applicable), which may include medical students, residents, and/or fellows, to perform the following surgical operation/ procedure and administer such anesthesia as may be determined necessary by my physician: Operation/Procedure name (s) COLONOSCOPY on Maytral B Frye   2.   I recognize that during the surgical operation/procedure, unforeseen conditions may necessitate additional or different procedures than those listed above.  I, therefore, further authorize and request that the above-named surgeon, assistants, or designees perform such procedures as are, in their judgment, necessary and desirable.    3.   My surgeon/physician has discussed prior to my surgery the potential benefits, risks and side effects of this procedure; the likelihood of achieving goals; and potential problems that might occur during recuperation.  They also discussed reasonable alternatives to the procedure, including risks, benefits, and side effects related to the alternatives and risks related to not receiving this procedure.  I have had all my questions answered and I acknowledge that no guarantee has been made as to the result that may be obtained.    4.   Should the need arise during my operation/procedure, which includes change of level of care prior to discharge, I also consent to the administration of blood and/or blood products.  Further, I understand that despite careful testing and screening of blood or blood products by collecting agencies, I may still be subject to ill effects as a result of receiving a blood transfusion and/or blood products.  The following are some, but not all, of the potential risks that can occur: fever and allergic reactions, hemolytic reactions, transmission of diseases such as  Hepatitis, AIDS and Cytomegalovirus (CMV) and fluid overload.  In the event that I wish to have an autologous transfusion of my own blood, or a directed donor transfusion, I will discuss this with my physician.  Check only if Refusing Blood or Blood Products  I understand refusal of blood or blood products as deemed necessary by my physician may have serious consequences to my condition to include possible death. I hereby assume responsibility for my refusal and release the hospital, its personnel, and my physicians from any responsibility for the consequences of my refusal.    o  Refuse   5.   I authorize the use of any specimen, organs, tissues, body parts or foreign objects that may be removed from my body during the operation/procedure for diagnosis, research or teaching purposes and their subsequent disposal by hospital authorities.  I also authorize the release of specimen test results and/or written reports to my treating physician on the hospital medical staff or other referring or consulting physicians involved in my care, at the discretion of the Pathologist or my treating physician.    6.   I consent to the photographing or videotaping of the operations or procedures to be performed, including appropriate portions of my body for medical, scientific, or educational purposes, provided my identity is not revealed by the pictures or by descriptive texts accompanying them.  If the procedure has been photographed/videotaped, the surgeon will obtain the original picture, image, videotape or CD.  The hospital will not be responsible for storage, release or maintenance of the picture, image, tape or CD.    7.   I consent to the presence of a  or observers in the operating room as deemed necessary by my physician or their designees.    8.   I recognize that in the event my procedure results in extended X-Ray/fluoroscopy time, I may develop a skin reaction.    9. If I have a Do Not Attempt  Resuscitation (DNAR) order in place, that status will be suspended while in the operating room, procedural suite, and during the recovery period unless otherwise explicitly stated by me (or a person authorized to consent on my behalf). The surgeon or my attending physician will determine when the applicable recovery period ends for purposes of reinstating the DNAR order.  10. Patients having a sterilization procedure: I understand that if the procedure is successful the results will be permanent and it will therefore be impossible for me to inseminate, conceive, or bear children.  I also understand that the procedure is intended to result in sterility, although the result has not been guaranteed.   11. I acknowledge that my physician has explained sedation/analgesia administration to me including the risk and benefits I consent to the administration of sedation/analgesia as may be necessary or desirable in the judgment of my physician.    I CERTIFY THAT I HAVE READ AND FULLY UNDERSTAND THE ABOVE CONSENT TO OPERATION and/or OTHER PROCEDURE.     ____________________________________  _________________________________        ______________________________  Signature of Patient    Signature of Responsible Person                Printed Name of Responsible Person                                      ____________________________________  _____________________________                ________________________________  Signature of Witness        Date  Time         Relationship to Patient    STATEMENT OF PHYSICIAN My signature below affirms that prior to the time of the procedure; I have explained to the patient and/or his/her legal representative, the risks and benefits involved in the proposed treatment and any reasonable alternative to the proposed treatment. I have also explained the risks and benefits involved in refusal of the proposed treatment and alternatives to the proposed treatment and have answered the patient's  questions. If I have a significant financial interest in a co-management agreement or a significant financial interest in any product or implant, or other significant relationship used in this procedure/surgery, I have disclosed this and had a discussion with my patient.     _____________________________________________________              _____________________________  (Signature of Physician)                                                                                         (Date)                                   (Time)  Patient Name: Pedro Vieyra      : 1960      Printed: 2024     Medical Record #: [id number]                                      Page 1 of 1

## (undated) NOTE — LETTER
Date & Time: 3/21/2018, 3:06 PM  Patient: Vivek Villagran  Attending Provider:    Sincerely,    ASHLEIGH Magaña         To Whom It May Concern:    Yovany Jean was seen and treated in our department on 3/21/2018.  She should not return to work until 3

## (undated) NOTE — Clinical Note
Robert Carter, please have your staff reach out to this patient regarding the post-menopausal bleeding. It could of course be playing a role in her lower pelvic discomfort. She will be seeing me in 12 weeks for a GI follow up. Sincerely,Sana Polanco PA-C

## (undated) NOTE — LETTER
1/25/2019          To Whom It May Concern:    Vivek Villagran is currently under my medical care. She may return to work on 2/4/2019 with the following restrictions: no pushing, pulling or lifting with right arm for 5 weeks.    If you require additional inf

## (undated) NOTE — MR AVS SNAPSHOT
After Visit Summary   11/16/2021    Aida Bains   MRN: [id number]           Visit Information     Date & Time  11/16/2021 10:40 AM Provider  Zina Peter MD [address] [place] [address], [address] [place] [address],[address], [facility]  Phone  [phone] year (around 11/16/2022) for annual gyne exam.               Did you know that [facility] primary care physicians now offer Video Visits through [address] for adult patients for a variety of conditions such as allergies, back pain and cold symptoms?  Skip the drive

## (undated) NOTE — LETTER
Dear Dr. Peralta Expose    This letter is to inform you that Corrina Moore has been attending Physical Therapy with me. See below for my most recent plan of care.     Diagnosis: Complete tear of right rotator cuff (M75.121)  Biceps tendonitis on right

## (undated) NOTE — LETTER
Date & Time: 3/21/2018, 2:54 PM  Patient: Cindy Lipscomb  Attending Provider:    Sincerely,    ASHLEIGH Yoo         To Whom It May Concern:    Jordi Rodriguez was seen and treated in our department on 3/21/2018.  She should not return to work until 3